# Patient Record
Sex: FEMALE | Race: WHITE | NOT HISPANIC OR LATINO
[De-identification: names, ages, dates, MRNs, and addresses within clinical notes are randomized per-mention and may not be internally consistent; named-entity substitution may affect disease eponyms.]

---

## 2016-09-10 RX ORDER — METOPROLOL TARTRATE 50 MG
1 TABLET ORAL
Qty: 0 | Refills: 0 | COMMUNITY
Start: 2016-09-10

## 2016-09-10 RX ORDER — AMIODARONE HYDROCHLORIDE 400 MG/1
1 TABLET ORAL
Qty: 0 | Refills: 0 | COMMUNITY
Start: 2016-09-10

## 2016-09-10 RX ORDER — DOCUSATE SODIUM 100 MG
1 CAPSULE ORAL
Qty: 0 | Refills: 0 | COMMUNITY
Start: 2016-09-10

## 2017-01-06 ENCOUNTER — APPOINTMENT (OUTPATIENT)
Dept: RADIOLOGY | Facility: HOSPITAL | Age: 82
End: 2017-01-06

## 2017-01-06 ENCOUNTER — OUTPATIENT (OUTPATIENT)
Dept: OUTPATIENT SERVICES | Facility: HOSPITAL | Age: 82
LOS: 1 days | End: 2017-01-06
Payer: COMMERCIAL

## 2017-01-06 DIAGNOSIS — R10.2 PELVIC AND PERINEAL PAIN: ICD-10-CM

## 2017-01-06 PROCEDURE — 72170 X-RAY EXAM OF PELVIS: CPT | Mod: 26

## 2017-01-09 ENCOUNTER — RECORD ABSTRACTING (OUTPATIENT)
Age: 82
End: 2017-01-09

## 2017-01-09 DIAGNOSIS — N95.0 POSTMENOPAUSAL BLEEDING: ICD-10-CM

## 2017-01-10 ENCOUNTER — INPATIENT (INPATIENT)
Facility: HOSPITAL | Age: 82
LOS: 2 days | Discharge: NURSING, REHAB OR LONG-TERM CARE | End: 2017-01-13
Attending: INTERNAL MEDICINE | Admitting: INTERNAL MEDICINE
Payer: MEDICARE

## 2017-01-10 ENCOUNTER — APPOINTMENT (OUTPATIENT)
Dept: GYNECOLOGIC ONCOLOGY | Facility: CLINIC | Age: 82
End: 2017-01-10

## 2017-01-10 VITALS
SYSTOLIC BLOOD PRESSURE: 127 MMHG | RESPIRATION RATE: 18 BRPM | OXYGEN SATURATION: 99 % | DIASTOLIC BLOOD PRESSURE: 57 MMHG | HEART RATE: 60 BPM | TEMPERATURE: 98 F

## 2017-01-10 DIAGNOSIS — S32.10XA UNSPECIFIED FRACTURE OF SACRUM, INITIAL ENCOUNTER FOR CLOSED FRACTURE: ICD-10-CM

## 2017-01-10 DIAGNOSIS — Z95.828 PRESENCE OF OTHER VASCULAR IMPLANTS AND GRAFTS: Chronic | ICD-10-CM

## 2017-01-10 LAB
ALBUMIN SERPL ELPH-MCNC: 3.3 G/DL — SIGNIFICANT CHANGE UP (ref 3.3–5)
ALP SERPL-CCNC: 84 U/L — SIGNIFICANT CHANGE UP (ref 40–120)
ALT FLD-CCNC: 14 U/L — SIGNIFICANT CHANGE UP (ref 4–33)
ANISOCYTOSIS BLD QL: SLIGHT — SIGNIFICANT CHANGE UP
AST SERPL-CCNC: 18 U/L — SIGNIFICANT CHANGE UP (ref 4–32)
BASOPHILS # BLD AUTO: 0.02 K/UL — SIGNIFICANT CHANGE UP (ref 0–0.2)
BASOPHILS NFR BLD AUTO: 0.3 % — SIGNIFICANT CHANGE UP (ref 0–2)
BASOPHILS NFR SPEC: 0 % — SIGNIFICANT CHANGE UP (ref 0–2)
BILIRUB SERPL-MCNC: 0.4 MG/DL — SIGNIFICANT CHANGE UP (ref 0.2–1.2)
BUN SERPL-MCNC: 23 MG/DL — SIGNIFICANT CHANGE UP (ref 7–23)
CALCIUM SERPL-MCNC: 8.4 MG/DL — SIGNIFICANT CHANGE UP (ref 8.4–10.5)
CHLORIDE SERPL-SCNC: 104 MMOL/L — SIGNIFICANT CHANGE UP (ref 98–107)
CO2 SERPL-SCNC: 22 MMOL/L — SIGNIFICANT CHANGE UP (ref 22–31)
CREAT SERPL-MCNC: 0.75 MG/DL — SIGNIFICANT CHANGE UP (ref 0.5–1.3)
EOSINOPHIL # BLD AUTO: 0.12 K/UL — SIGNIFICANT CHANGE UP (ref 0–0.5)
EOSINOPHIL NFR BLD AUTO: 1.7 % — SIGNIFICANT CHANGE UP (ref 0–6)
EOSINOPHIL NFR FLD: 4 % — SIGNIFICANT CHANGE UP (ref 0–6)
GLUCOSE SERPL-MCNC: 80 MG/DL — SIGNIFICANT CHANGE UP (ref 70–99)
HCT VFR BLD CALC: 37.4 % — SIGNIFICANT CHANGE UP (ref 34.5–45)
HGB BLD-MCNC: 11.9 G/DL — SIGNIFICANT CHANGE UP (ref 11.5–15.5)
IMM GRANULOCYTES NFR BLD AUTO: 0.4 % — SIGNIFICANT CHANGE UP (ref 0–1.5)
LYMPHOCYTES # BLD AUTO: 1.82 K/UL — SIGNIFICANT CHANGE UP (ref 1–3.3)
LYMPHOCYTES # BLD AUTO: 26.5 % — SIGNIFICANT CHANGE UP (ref 13–44)
LYMPHOCYTES NFR SPEC AUTO: 24 % — SIGNIFICANT CHANGE UP (ref 13–44)
MACROCYTES BLD QL: SLIGHT — SIGNIFICANT CHANGE UP
MANUAL SMEAR VERIFICATION: YES — SIGNIFICANT CHANGE UP
MCHC RBC-ENTMCNC: 31.8 % — LOW (ref 32–36)
MCHC RBC-ENTMCNC: 34.5 PG — HIGH (ref 27–34)
MCV RBC AUTO: 108.4 FL — HIGH (ref 80–100)
MONOCYTES # BLD AUTO: 0.64 K/UL — SIGNIFICANT CHANGE UP (ref 0–0.9)
MONOCYTES NFR BLD AUTO: 9.3 % — SIGNIFICANT CHANGE UP (ref 2–14)
MONOCYTES NFR BLD: 2 % — SIGNIFICANT CHANGE UP (ref 2–9)
NEUTROPHIL AB SER-ACNC: 67 % — SIGNIFICANT CHANGE UP (ref 43–77)
NEUTROPHILS # BLD AUTO: 4.25 K/UL — SIGNIFICANT CHANGE UP (ref 1.8–7.4)
NEUTROPHILS NFR BLD AUTO: 61.8 % — SIGNIFICANT CHANGE UP (ref 43–77)
NEUTS BAND # BLD: 1 % — SIGNIFICANT CHANGE UP (ref 0–6)
OVALOCYTES BLD QL SMEAR: SLIGHT — SIGNIFICANT CHANGE UP
PLATELET # BLD AUTO: 238 K/UL — SIGNIFICANT CHANGE UP (ref 150–400)
PLATELET COUNT - ESTIMATE: NORMAL — SIGNIFICANT CHANGE UP
PMV BLD: 11.2 FL — SIGNIFICANT CHANGE UP (ref 7–13)
POTASSIUM SERPL-MCNC: 4.6 MMOL/L — SIGNIFICANT CHANGE UP (ref 3.5–5.3)
POTASSIUM SERPL-SCNC: 4.6 MMOL/L — SIGNIFICANT CHANGE UP (ref 3.5–5.3)
PROT SERPL-MCNC: 6.4 G/DL — SIGNIFICANT CHANGE UP (ref 6–8.3)
RBC # BLD: 3.45 M/UL — LOW (ref 3.8–5.2)
RBC # FLD: 14.3 % — SIGNIFICANT CHANGE UP (ref 10.3–14.5)
SODIUM SERPL-SCNC: 139 MMOL/L — SIGNIFICANT CHANGE UP (ref 135–145)
VARIANT LYMPHS # BLD: 2 % — SIGNIFICANT CHANGE UP
WBC # BLD: 6.88 K/UL — SIGNIFICANT CHANGE UP (ref 3.8–10.5)
WBC # FLD AUTO: 6.88 K/UL — SIGNIFICANT CHANGE UP (ref 3.8–10.5)

## 2017-01-10 PROCEDURE — 72192 CT PELVIS W/O DYE: CPT | Mod: 26

## 2017-01-10 PROCEDURE — 99223 1ST HOSP IP/OBS HIGH 75: CPT

## 2017-01-10 PROCEDURE — 76376 3D RENDER W/INTRP POSTPROCES: CPT | Mod: 26

## 2017-01-10 RX ORDER — ENOXAPARIN SODIUM 100 MG/ML
40 INJECTION SUBCUTANEOUS EVERY 24 HOURS
Qty: 0 | Refills: 0 | Status: DISCONTINUED | OUTPATIENT
Start: 2017-01-10 | End: 2017-01-13

## 2017-01-10 RX ORDER — TRAMADOL HYDROCHLORIDE 50 MG/1
50 TABLET ORAL ONCE
Qty: 0 | Refills: 0 | Status: DISCONTINUED | OUTPATIENT
Start: 2017-01-10 | End: 2017-01-10

## 2017-01-10 RX ORDER — LIDOCAINE 4 G/100G
1 CREAM TOPICAL ONCE
Qty: 0 | Refills: 0 | Status: COMPLETED | OUTPATIENT
Start: 2017-01-10 | End: 2017-01-10

## 2017-01-10 RX ADMIN — LIDOCAINE 1 PATCH: 4 CREAM TOPICAL at 20:28

## 2017-01-10 RX ADMIN — TRAMADOL HYDROCHLORIDE 50 MILLIGRAM(S): 50 TABLET ORAL at 21:28

## 2017-01-10 RX ADMIN — TRAMADOL HYDROCHLORIDE 50 MILLIGRAM(S): 50 TABLET ORAL at 20:28

## 2017-01-10 NOTE — ED PROVIDER NOTE - OBJECTIVE STATEMENT
89F h/o CHF, PPM for sick sinus, DVTs with IVC filter, off AC due to hemorrhagic stroke, UTIs, macular degeneration, [place], hypothyroidism, post menopausal vag bleed not yet worked up, p/w buttock pain. She had fall on buttocks 10 days ago. had neg Xray pelvis and has been treated with tylenol initially but pain has worsened especially lower sacral area. sent in by PCP for CT pelvis. she only has pain when moving or being touched. No other injuries reported. No other acute symptoms.   Meds: levothyroxin, amiodorone, spironolactone, furosamide, stool softeners.

## 2017-01-10 NOTE — H&P ADULT. - PROBLEM SELECTOR PLAN 2
- noted on TTE of 05/2016; no acute issues presently  - Aide reports significant b/l leg edema at times; only mild when seen  - on lasix at home (20 mg TIW) - consider changing to daily  - continues on cardioprotective medications - noted on TTE of 05/2016; no acute issues presently  - Aide reports significant b/l leg edema at times; only mild when seen  - on lasix at home (20 mg TIW) - consider changing to daily  - continues on cardioprotective medications  - f/u TSH level in the AM  - please follow-up with pharmacy for clarification of medications (e-mailed)

## 2017-01-10 NOTE — ED PROVIDER NOTE - CARE PLAN
Principal Discharge DX:	Sacral fracture, closed Principal Discharge DX:	Sacral fracture, closed  Instructions for follow-up, activity and diet:	admit for inability to ambulate - pain control, PT eval - possible rehab placement

## 2017-01-10 NOTE — H&P ADULT. - PMH
Cerebrovascular accident (CVA) due to other mechanism  ~ hemorrhagic  CHF (Congestive Heart Failure)  ~ diastolic, Stage I (05/20/2016)  Dementia    DVT (deep venous thrombosis)    Hard of hearing, bilateral    Hypothyroidism, unspecified type    Kidney tumor    Macular degeneration    Post menopausal problems  ~ bleeding (no previous evaluation for same)

## 2017-01-10 NOTE — ED ADULT TRIAGE NOTE - CHIEF COMPLAINT QUOTE
pt has increase of pain for several days. Pt states she is s/p fall causing injury to her buttock area pt states pain has increased to her legs making walking difficult

## 2017-01-10 NOTE — ED ADULT NURSE NOTE - PMH
CHF (Congestive Heart Failure)    Dementia    Dementia    DVT (deep venous thrombosis)    Kidney tumor

## 2017-01-10 NOTE — ED ADULT NURSE NOTE - OBJECTIVE STATEMENT
Pt received to rm 26, a&ox3, c/o pain to sacrum s/p fall 10 days ago -- as per family member pt had XR 7 days after fall that was unremarkable. Pt referred to ED by GYN for vaginal bleeding with questionable tumor--pt requesting possible CT. MD Cardona by bedside. Labs drawn and sent, IVL placed. Report given to primary RN Diamond. Skin intact. (float RN)

## 2017-01-10 NOTE — H&P ADULT. - PROBLEM SELECTOR PLAN 6
- dysphagia 3, soft thin liquids, ensure plus 1 can daily (prescribed on last admission; reinitiated)

## 2017-01-10 NOTE — ED PROVIDER NOTE - PROGRESS NOTE DETAILS
CT shows S3 fracture, spine recommends rest analgesia only, In spite of added analgesia- tramadol and lidocaine patch, patient unable to get up due to pain and will need admission

## 2017-01-10 NOTE — ED PROVIDER NOTE - PHYSICAL EXAMINATION
C spine non tender, no head injury, T/L spine non tender, + sacral tenderness, no deformity, moves legs normally, sensation intact.

## 2017-01-10 NOTE — H&P ADULT. - PROBLEM SELECTOR PLAN 4
- MCV persistently elevated  - last vitamin B12 level = 498 (05/2016)  - methymalonic acid level, TSH level in the AM  - ?appears to be on vitamin B-12 supplement orally, but if intrinsic factor deficient/not working, better to supplement sublingually chronically, or IM acutely (pending methylmalonic acid level result) - MCV persistently elevated  - last vitamin B12 level = 498 (05/2016)  - methymalonic acid level, TSH level in the AM  - ?appears to be on vitamin B-12 supplement orally, but if intrinsic factor deficient/not working, better to supplement IM or sublingually (pending methylmalonic acid level result)

## 2017-01-10 NOTE — H&P ADULT. - HISTORY OF PRESENT ILLNESS
89 year old female, with PH significant for CHF, DVT (no longer on AC), Hemorrhagic CVA, Macular degeneration, Dementia, Kidney tumor, Hard of hearing, Hypothyroidism, Post menopausal bleeding and IVC filter, presented to the ED secondary to       Vital signs upon ED presentation as follows: BP =127/57, HR = 60, RR = 18, T = 36.8 C (98.2 F), O2 Sat = 99 R on RA.  Diagnosed with Sacral Fracture, Closed.  Prescribed lidocaine patch, and tramadol 50 mg x 1. 89 year old female, with PH significant for CHF, DVT (no longer on AC), Hemorrhagic CVA, Macular degeneration, Dementia, Kidney tumor, Hard of hearing, Hypothyroidism, Post menopausal bleeding (not evaluated) and IVC filter, presented to the ED, after being sent by her PMD, secondary to worsening buttock pain.  Seen and evaluated at bedside with HHA at bedside; NAD.  Patient reports that she has been having right groin pain for the past few days.  Complains of pain with movement of the joint.  States no prior or current pain of the buttock - despite repeated questioning (but Aide confirms presence of pain prior to ED).  Patient reportedly fell in the bathroom ~ 10 days ago; presumed that she was using the toilet and tried to get up when she fell.  Ambulates with a walker at baseline, and still does so, despite the pain.  After the fall, patient was noted to have undergone an X-ray, with negative findings, then treated with tylenol PRN pain.  However, pain worsened and PMD decided to sent patient to the Hospital for further evaluation - including CT scan of the area.    Vital signs upon ED presentation as follows: BP =127/57, HR = 60, RR = 18, T = 36.8 C (98.2 F), O2 Sat = 99 R on RA.  Diagnosed with Sacral Fracture, Closed.  Prescribed lidocaine patch, and tramadol 50 mg x 1.    Ambulates with a walker at baseline.

## 2017-01-10 NOTE — H&P ADULT. - PROBLEM SELECTOR PLAN 1
- most likely sustained during fall ~ 10 days ago  - also complains of right groin pain  - [id number] year old female, with PH significant for CHF, DVT (no longer on AC), Hemorrhagic CVA, Macular degeneration, Dementia, Kidney tumor, Hard of hearing, Hypothyroidism, Post menopausal bleeding (not evaluated) and IVC filter, presented to the ED, after being sent by her PMD, secondary to worsening buttock pain.  Seen and evaluated at bedside with HHA at bedside; NAD.  Patient reports that she has been having right groin pain for the past few days.  Complains of pain with movement of the joint.  States no prior or current pain of the buttock - despite repeated questioning (but Aide confirms presence of pain prior to ED).  Patient reportedly fell in the bathroom ~ 10 days ago; presumed that she was using the toilet and tried to get up when she fell.  Ambulates with a walker at baseline, and still does so, despite the pain.  After the fall, patient was noted to have undergone an X-ray, with negative findings, then treated with tylenol PRN pain.  However, pain worsened and PMD decided to sent patient to the Hospital for further evaluation - including CT scan of the area.  - already prescribed lidoderm patch and tramadol in the ED; will continue, along with tylenol  - fall precautions  - physical therapy (order placed)  - social work evaluation (order placed) - presents with worsening sacral pain, and now complaining of right groin pain  - most likely sustained during fall ~ 10 days ago  - already prescribed lidoderm patch and tramadol in the ED; will continue, along with tylenol  - vitamin d3, vitamin B12, TSH levels in the AM  - fall precautions  - physical therapy (order placed)  - social work evaluation (order placed)

## 2017-01-10 NOTE — H&P ADULT. - PROBLEM SELECTOR PLAN 3
- no acute issues presently  - on levothyroxine 50 mcg per handwritten paper in chart  - follow-up TSH level in the AM - especially in the setting of CHF - no acute issues presently  - on levothyroxine 50 mcg per handwritten paper in chart  - follow-up TSH level in the AM - especially in the setting of CHF  - please follow-up with pharmacy for clarification of medications (e-mailed)

## 2017-01-10 NOTE — H&P ADULT. - ASSESSMENT
89 year old female, with PH significant for CHF, DVT (no longer on AC), Hemorrhagic CVA, Macular degeneration, Dementia, Kidney tumor, Hard of hearing, Hypothyroidism, Post menopausal bleeding (not evaluated) and IVC filter, presented to the ED, after being sent by her PMD, secondary to worsening buttock pain.  Diagnosed with Sacral Fracture, Closed.  Admitted for further management of:

## 2017-01-11 DIAGNOSIS — R63.8 OTHER SYMPTOMS AND SIGNS CONCERNING FOOD AND FLUID INTAKE: ICD-10-CM

## 2017-01-11 DIAGNOSIS — I50.30 UNSPECIFIED DIASTOLIC (CONGESTIVE) HEART FAILURE: ICD-10-CM

## 2017-01-11 DIAGNOSIS — Z41.8 ENCOUNTER FOR OTHER PROCEDURES FOR PURPOSES OTHER THAN REMEDYING HEALTH STATE: ICD-10-CM

## 2017-01-11 DIAGNOSIS — E03.9 HYPOTHYROIDISM, UNSPECIFIED: ICD-10-CM

## 2017-01-11 DIAGNOSIS — S32.139A: ICD-10-CM

## 2017-01-11 DIAGNOSIS — D75.89 OTHER SPECIFIED DISEASES OF BLOOD AND BLOOD-FORMING ORGANS: ICD-10-CM

## 2017-01-11 PROBLEM — N95.9 UNSPECIFIED MENOPAUSAL AND PERIMENOPAUSAL DISORDER: Chronic | Status: ACTIVE | Noted: 2017-01-10

## 2017-01-11 LAB
24R-OH-CALCIDIOL SERPL-MCNC: 22.4 NG/ML — LOW (ref 30–100)
TSH SERPL-MCNC: 30.69 UIU/ML — HIGH (ref 0.27–4.2)

## 2017-01-11 RX ORDER — DOCUSATE SODIUM 100 MG
100 CAPSULE ORAL THREE TIMES A DAY
Qty: 0 | Refills: 0 | Status: DISCONTINUED | OUTPATIENT
Start: 2017-01-11 | End: 2017-01-13

## 2017-01-11 RX ORDER — ASPIRIN/CALCIUM CARB/MAGNESIUM 324 MG
81 TABLET ORAL DAILY
Qty: 0 | Refills: 0 | Status: DISCONTINUED | OUTPATIENT
Start: 2017-01-11 | End: 2017-01-13

## 2017-01-11 RX ORDER — RACEMETHIONINE 15 MG/ML
500 LIQUID (ML) ORAL
Qty: 0 | Refills: 0 | COMMUNITY

## 2017-01-11 RX ORDER — SENNA PLUS 8.6 MG/1
3 TABLET ORAL
Qty: 0 | Refills: 0 | COMMUNITY

## 2017-01-11 RX ORDER — POLYETHYLENE GLYCOL 3350 17 G/17G
17 POWDER, FOR SOLUTION ORAL
Qty: 0 | Refills: 0 | Status: DISCONTINUED | OUTPATIENT
Start: 2017-01-11 | End: 2017-01-13

## 2017-01-11 RX ORDER — LEVOTHYROXINE SODIUM 125 MCG
50 TABLET ORAL DAILY
Qty: 0 | Refills: 0 | Status: DISCONTINUED | OUTPATIENT
Start: 2017-01-11 | End: 2017-01-12

## 2017-01-11 RX ORDER — AMIODARONE HYDROCHLORIDE 400 MG/1
200 TABLET ORAL DAILY
Qty: 0 | Refills: 0 | Status: DISCONTINUED | OUTPATIENT
Start: 2017-01-11 | End: 2017-01-13

## 2017-01-11 RX ORDER — SODIUM CHLORIDE 9 MG/ML
1000 INJECTION INTRAMUSCULAR; INTRAVENOUS; SUBCUTANEOUS
Qty: 0 | Refills: 0 | Status: DISCONTINUED | OUTPATIENT
Start: 2017-01-11 | End: 2017-01-13

## 2017-01-11 RX ORDER — LIDOCAINE 4 G/100G
1 CREAM TOPICAL DAILY
Qty: 0 | Refills: 0 | Status: DISCONTINUED | OUTPATIENT
Start: 2017-01-11 | End: 2017-01-13

## 2017-01-11 RX ORDER — FUROSEMIDE 40 MG
20 TABLET ORAL DAILY
Qty: 0 | Refills: 0 | Status: DISCONTINUED | OUTPATIENT
Start: 2017-01-11 | End: 2017-01-13

## 2017-01-11 RX ORDER — TRAMADOL HYDROCHLORIDE 50 MG/1
50 TABLET ORAL EVERY 8 HOURS
Qty: 0 | Refills: 0 | Status: DISCONTINUED | OUTPATIENT
Start: 2017-01-11 | End: 2017-01-12

## 2017-01-11 RX ORDER — ATORVASTATIN CALCIUM 80 MG/1
20 TABLET, FILM COATED ORAL AT BEDTIME
Qty: 0 | Refills: 0 | Status: DISCONTINUED | OUTPATIENT
Start: 2017-01-11 | End: 2017-01-13

## 2017-01-11 RX ORDER — POLYETHYLENE GLYCOL 3350 17 G/17G
0 POWDER, FOR SOLUTION ORAL
Qty: 0 | Refills: 0 | COMMUNITY

## 2017-01-11 RX ORDER — SENNA PLUS 8.6 MG/1
2 TABLET ORAL AT BEDTIME
Qty: 0 | Refills: 0 | Status: DISCONTINUED | OUTPATIENT
Start: 2017-01-11 | End: 2017-01-13

## 2017-01-11 RX ORDER — SPIRONOLACTONE 25 MG/1
1 TABLET, FILM COATED ORAL
Qty: 0 | Refills: 0 | COMMUNITY

## 2017-01-11 RX ORDER — ACETAMINOPHEN 500 MG
650 TABLET ORAL EVERY 6 HOURS
Qty: 0 | Refills: 0 | Status: DISCONTINUED | OUTPATIENT
Start: 2017-01-11 | End: 2017-01-13

## 2017-01-11 RX ADMIN — LIDOCAINE 1 PATCH: 4 CREAM TOPICAL at 11:03

## 2017-01-11 RX ADMIN — Medication 50 MICROGRAM(S): at 06:16

## 2017-01-11 RX ADMIN — ATORVASTATIN CALCIUM 20 MILLIGRAM(S): 80 TABLET, FILM COATED ORAL at 21:08

## 2017-01-11 RX ADMIN — LIDOCAINE 1 PATCH: 4 CREAM TOPICAL at 20:27

## 2017-01-11 RX ADMIN — Medication 100 MILLIGRAM(S): at 06:16

## 2017-01-11 RX ADMIN — ENOXAPARIN SODIUM 40 MILLIGRAM(S): 100 INJECTION SUBCUTANEOUS at 23:09

## 2017-01-11 RX ADMIN — AMIODARONE HYDROCHLORIDE 200 MILLIGRAM(S): 400 TABLET ORAL at 06:16

## 2017-01-11 RX ADMIN — Medication 100 MILLIGRAM(S): at 11:37

## 2017-01-11 RX ADMIN — Medication 1 TABLET(S): at 11:03

## 2017-01-11 RX ADMIN — SENNA PLUS 2 TABLET(S): 8.6 TABLET ORAL at 21:08

## 2017-01-11 RX ADMIN — Medication 0.5 MILLIGRAM(S): at 23:08

## 2017-01-11 RX ADMIN — SODIUM CHLORIDE 75 MILLILITER(S): 9 INJECTION INTRAMUSCULAR; INTRAVENOUS; SUBCUTANEOUS at 07:53

## 2017-01-11 RX ADMIN — TRAMADOL HYDROCHLORIDE 50 MILLIGRAM(S): 50 TABLET ORAL at 08:36

## 2017-01-11 RX ADMIN — TRAMADOL HYDROCHLORIDE 50 MILLIGRAM(S): 50 TABLET ORAL at 08:00

## 2017-01-11 RX ADMIN — Medication 100 MILLIGRAM(S): at 21:08

## 2017-01-11 RX ADMIN — ENOXAPARIN SODIUM 40 MILLIGRAM(S): 100 INJECTION SUBCUTANEOUS at 00:57

## 2017-01-11 RX ADMIN — Medication 81 MILLIGRAM(S): at 11:03

## 2017-01-11 NOTE — PHYSICAL THERAPY INITIAL EVALUATION ADULT - PERTINENT HX OF CURRENT PROBLEM, REHAB EVAL
89 year old female, with PH significant for CHF, DVT (no longer on AC), Hemorrhagic CVA, Macular degeneration, Dementia, Kidney tumor, Hard of hearing, Hypothyroidism, Post menopausal bleeding (not evaluated) and IVC filter, presented to the ED, after being sent by her PMD, secondary to worsening buttock pain.

## 2017-01-11 NOTE — PROVIDER CONTACT NOTE (MEDICATION) - RECOMMENDATIONS
Consider increase in dose of levothyroxine if pt is compliant; consider d/c methenamine upon discharge; consider possible pt non-compliance with aldactone and furosemide.

## 2017-01-11 NOTE — PROVIDER CONTACT NOTE (MEDICATION) - ASSESSMENT
EP is a 89yo F, with PMH: CHF, DVT (no longer on AC), hemorrhagic CVA, macular degeneration, dementia, kidney tumor, hypothyroidism, post menopausal bleeding (not evaluated) and IVC filter. She presented to ED (sent by PMD), secondary to worsening buttock pain. TSH level high, indicating pt is either non-compliant with taking levothyroxine 50mcg tablet once daily or an increase in dose is necessary. Methenamine dosing for UTI prophylaxis is 1g twice daily and is contraindicated in any renal impairment (pt currently taking 500mg once daily). Upon discharge, consider discontinuing this medication if any renal impairment present or if not necessary (no evidence of dosage reduction benefit). Interview with caretaker reveals that pt is taking aldactone TIW rather than QD for unknown reasons. Pt has not filled furosemide 20mg since 10/21/16 (one month supply), indicting possible non-compliance.

## 2017-01-11 NOTE — PATIENT PROFILE ADULT. - NS PRO AD PATIENT TYPE
Do Not Resuscitate (DNR)/Medical Orders for Life-Sustaining Treatment (MOLST)/Health Care Proxy (HCP)

## 2017-01-12 ENCOUNTER — TRANSCRIPTION ENCOUNTER (OUTPATIENT)
Age: 82
End: 2017-01-12

## 2017-01-12 LAB
ALBUMIN SERPL ELPH-MCNC: 3.6 G/DL — SIGNIFICANT CHANGE UP (ref 3.3–5)
ALP SERPL-CCNC: 99 U/L — SIGNIFICANT CHANGE UP (ref 40–120)
ALT FLD-CCNC: 13 U/L — SIGNIFICANT CHANGE UP (ref 4–33)
AST SERPL-CCNC: 19 U/L — SIGNIFICANT CHANGE UP (ref 4–32)
BASOPHILS # BLD AUTO: 0.02 K/UL — SIGNIFICANT CHANGE UP (ref 0–0.2)
BASOPHILS NFR BLD AUTO: 0.3 % — SIGNIFICANT CHANGE UP (ref 0–2)
BILIRUB SERPL-MCNC: 0.6 MG/DL — SIGNIFICANT CHANGE UP (ref 0.2–1.2)
BUN SERPL-MCNC: 16 MG/DL — SIGNIFICANT CHANGE UP (ref 7–23)
CALCIUM SERPL-MCNC: 9.2 MG/DL — SIGNIFICANT CHANGE UP (ref 8.4–10.5)
CHLORIDE SERPL-SCNC: 104 MMOL/L — SIGNIFICANT CHANGE UP (ref 98–107)
CO2 SERPL-SCNC: 23 MMOL/L — SIGNIFICANT CHANGE UP (ref 22–31)
CREAT SERPL-MCNC: 0.79 MG/DL — SIGNIFICANT CHANGE UP (ref 0.5–1.3)
EOSINOPHIL # BLD AUTO: 0.14 K/UL — SIGNIFICANT CHANGE UP (ref 0–0.5)
EOSINOPHIL NFR BLD AUTO: 2 % — SIGNIFICANT CHANGE UP (ref 0–6)
GLUCOSE SERPL-MCNC: 98 MG/DL — SIGNIFICANT CHANGE UP (ref 70–99)
HCT VFR BLD CALC: 38.6 % — SIGNIFICANT CHANGE UP (ref 34.5–45)
HGB BLD-MCNC: 12.2 G/DL — SIGNIFICANT CHANGE UP (ref 11.5–15.5)
IMM GRANULOCYTES NFR BLD AUTO: 0.1 % — SIGNIFICANT CHANGE UP (ref 0–1.5)
LYMPHOCYTES # BLD AUTO: 1.62 K/UL — SIGNIFICANT CHANGE UP (ref 1–3.3)
LYMPHOCYTES # BLD AUTO: 23.4 % — SIGNIFICANT CHANGE UP (ref 13–44)
MANUAL SMEAR VERIFICATION: SIGNIFICANT CHANGE UP
MCHC RBC-ENTMCNC: 31.6 % — LOW (ref 32–36)
MCHC RBC-ENTMCNC: 34 PG — SIGNIFICANT CHANGE UP (ref 27–34)
MCV RBC AUTO: 107.5 FL — HIGH (ref 80–100)
MONOCYTES # BLD AUTO: 0.63 K/UL — SIGNIFICANT CHANGE UP (ref 0–0.9)
MONOCYTES NFR BLD AUTO: 9.1 % — SIGNIFICANT CHANGE UP (ref 2–14)
NEUTROPHILS # BLD AUTO: 4.49 K/UL — SIGNIFICANT CHANGE UP (ref 1.8–7.4)
NEUTROPHILS NFR BLD AUTO: 65.1 % — SIGNIFICANT CHANGE UP (ref 43–77)
PLATELET # BLD AUTO: 278 K/UL — SIGNIFICANT CHANGE UP (ref 150–400)
PMV BLD: 11 FL — SIGNIFICANT CHANGE UP (ref 7–13)
POTASSIUM SERPL-MCNC: 4.1 MMOL/L — SIGNIFICANT CHANGE UP (ref 3.5–5.3)
POTASSIUM SERPL-SCNC: 4.1 MMOL/L — SIGNIFICANT CHANGE UP (ref 3.5–5.3)
PROT SERPL-MCNC: 6.9 G/DL — SIGNIFICANT CHANGE UP (ref 6–8.3)
RBC # BLD: 3.59 M/UL — LOW (ref 3.8–5.2)
RBC # FLD: 14.2 % — SIGNIFICANT CHANGE UP (ref 10.3–14.5)
SODIUM SERPL-SCNC: 142 MMOL/L — SIGNIFICANT CHANGE UP (ref 135–145)
T3 SERPL-MCNC: 80.8 NG/DL — SIGNIFICANT CHANGE UP (ref 80–200)
T4 AB SER-ACNC: 5.06 UG/DL — LOW (ref 5.1–13)
TSH SERPL-MCNC: 43.73 UIU/ML — HIGH (ref 0.27–4.2)
WBC # BLD: 6.91 K/UL — SIGNIFICANT CHANGE UP (ref 3.8–10.5)
WBC # FLD AUTO: 6.91 K/UL — SIGNIFICANT CHANGE UP (ref 3.8–10.5)

## 2017-01-12 RX ORDER — LEVOTHYROXINE SODIUM 125 MCG
100 TABLET ORAL DAILY
Qty: 0 | Refills: 0 | Status: DISCONTINUED | OUTPATIENT
Start: 2017-01-12 | End: 2017-01-13

## 2017-01-12 RX ORDER — TRAMADOL HYDROCHLORIDE 50 MG/1
50 TABLET ORAL EVERY 8 HOURS
Qty: 0 | Refills: 0 | Status: DISCONTINUED | OUTPATIENT
Start: 2017-01-12 | End: 2017-01-13

## 2017-01-12 RX ORDER — CHOLECALCIFEROL (VITAMIN D3) 125 MCG
1000 CAPSULE ORAL DAILY
Qty: 0 | Refills: 0 | Status: DISCONTINUED | OUTPATIENT
Start: 2017-01-12 | End: 2017-01-13

## 2017-01-12 RX ADMIN — Medication 100 MILLIGRAM(S): at 22:15

## 2017-01-12 RX ADMIN — Medication 100 MILLIGRAM(S): at 06:02

## 2017-01-12 RX ADMIN — SENNA PLUS 2 TABLET(S): 8.6 TABLET ORAL at 22:15

## 2017-01-12 RX ADMIN — LIDOCAINE 1 PATCH: 4 CREAM TOPICAL at 22:15

## 2017-01-12 RX ADMIN — Medication 81 MILLIGRAM(S): at 11:06

## 2017-01-12 RX ADMIN — Medication 20 MILLIGRAM(S): at 06:01

## 2017-01-12 RX ADMIN — TRAMADOL HYDROCHLORIDE 50 MILLIGRAM(S): 50 TABLET ORAL at 17:56

## 2017-01-12 RX ADMIN — Medication 50 MICROGRAM(S): at 06:01

## 2017-01-12 RX ADMIN — Medication 1 TABLET(S): at 11:06

## 2017-01-12 RX ADMIN — AMIODARONE HYDROCHLORIDE 200 MILLIGRAM(S): 400 TABLET ORAL at 06:02

## 2017-01-12 RX ADMIN — Medication 100 MILLIGRAM(S): at 15:59

## 2017-01-12 RX ADMIN — TRAMADOL HYDROCHLORIDE 50 MILLIGRAM(S): 50 TABLET ORAL at 18:20

## 2017-01-12 RX ADMIN — ATORVASTATIN CALCIUM 20 MILLIGRAM(S): 80 TABLET, FILM COATED ORAL at 22:14

## 2017-01-12 RX ADMIN — Medication 0.5 MILLIGRAM(S): at 11:06

## 2017-01-12 RX ADMIN — Medication 1000 UNIT(S): at 15:59

## 2017-01-12 NOTE — DISCHARGE NOTE ADULT - CARE PLAN
Principal Discharge DX:	Sacral fracture, closed  Goal:	improved  Instructions for follow-up, activity and diet:	after a fall previous to admission Principal Discharge DX:	Sacral fracture, closed  Goal:	improved  Instructions for follow-up, activity and diet:	after a fall previous to admission  Follow up with physical therapy in rehab for increased ambulation ability and safe ambulation  Follow up with your primary care doctor  Secondary Diagnosis:	Hypothyroidism, unspecified type  Instructions for follow-up, activity and diet:	During your stay in the hospital we increased your synthroid dose to 100mcg because of your thyroid levels.  Please follow up with your primary care doctor or rehab physician to have your thyroid levels re-checked in order to determine appropriate dose of synthroid.

## 2017-01-12 NOTE — DISCHARGE NOTE ADULT - HOSPITAL COURSE
89 year old female, with PH significant for CHF, DVT (no longer on AC), Hemorrhagic CVA, Macular degeneration, Dementia, Kidney tumor, Hard of hearing, Hypothyroidism, Post menopausal bleeding (not evaluated) and IVC filter, presented to the ED, after being sent by her PMD, secondary to worsening buttock pain.  Diagnosed with Sacral Fracture, Closed.    Hospital Course:     Closed zone III fracture of sacrum  - presents with worsening sacral pain, and now complaining of right groin pain  - most likely sustained during fall ~ 10 days ago  - already prescribed lidoderm patch and tramadol in the ED; will continue, along with tylenol  - PT eval  - sw eval    CHF   - noted on TTE of 05/2016; no acute issues presently  - please follow-up with pharmacy for clarification of medications (e-mailed)- noted on TTE of 05/2016; no acute issues presently  - Aide reports significant b/l leg edema at times; only mild when seen    Hypothyroidism  - no acute issues presently  - on levothyroxine 50 mcg per handwritten paper in chart  - follow-up TSH level in the AM - especially in the setting of CHF    Macrocytosis  - MCV persistently elevated  - last vitamin B12 level = 498 (05/2016)  - methymalonic acid level, TSH level in the AM  - ?appears to be on vitamin B-12 supplement orally, but if intrinsic factor deficient/not working, better to supplement IM or sublingually (pending methylmalonic acid level result)    Need for prophylactic measure  - lovenox  - physical therapy  - ambulate with walker.     Nutrition  - dysphagia 3, soft thin liquids, ensure plus 1 can daily (prescribed on last admission; reinitiated). 89 year old female, with PH significant for CHF, DVT (no longer on AC), Hemorrhagic CVA, Macular degeneration, Dementia, Kidney tumor, Hard of hearing, Hypothyroidism, Post menopausal bleeding (not evaluated) and IVC filter, presented to the ED, after being sent by her PMD, secondary to worsening buttock pain.  Diagnosed with Sacral Fracture, Closed.    Hospital Course:     Closed zone III fracture of sacrum  - presents with worsening sacral pain, and now complaining of right groin pain  - most likely sustained during fall ~ 10 days ago  - already prescribed lidoderm patch and tramadol in the ED; will continue, along with tylenol  - PT eval - rec rehab  - sw eval - found placement in [place]    CHF   - noted on TTE of 05/2016; no acute issues presently  - Aide reports significant b/l leg edema at times; only mild when seen    Hypothyroidism  - no acute issues presently  - on levothyroxine 50 mcg per handwritten paper in chart  - follow-up TSH level in the AM - especially in the setting of CHF  - TSH elevated, Dr. Mcdonnell aware, synthroid dose increased to 100mcg daily, TSH to be followed up by PCP and/or rehab physician after discharge.    Macrocytosis  - MCV persistently elevated  - last vitamin B12 level = 498 (05/2016)  - methymalonic acid level, TSH level   - ?appears to be on vitamin B-12 supplement orally, but if intrinsic factor deficient/not working, better to supplement IM or sublingually (pending methylmalonic acid level result)    Need for prophylactic measure  - lovenox during admission  - physical therapy  - ambulate with walker.     Nutrition  - dysphagia 3, soft thin liquids, ensure plus 1 can daily (prescribed on last admission; reinitiated).    Dispo: rehab

## 2017-01-12 NOTE — DISCHARGE NOTE ADULT - ADDITIONAL INSTRUCTIONS
Please follow up with your primary care doctor or rehab physician to have your thyroid levels re-checked in order to determine appropriate dose of synthroid.    Follow physical therapy regimen in rehab in order to increase ambulation ability and safety while ambulating.

## 2017-01-12 NOTE — DISCHARGE NOTE ADULT - MEDICATION SUMMARY - MEDICATIONS TO STOP TAKING
I will STOP taking the medications listed below when I get home from the hospital:    atorvastatin 20 mg oral tablet  -- 1 tab(s) by mouth once a day (at bedtime)    metoprolol tartrate 25 mg oral tablet  -- 1 tab(s) by mouth 2 times a day    silver sulfADIAZINE 1% topical cream  -- 1 application on skin 2 times a day    Metoprolol Succinate ER 25 mg oral tablet, extended release  -- 1 tab(s) by mouth once a day    spironolactone 25 mg oral tablet  -- 1 tab(s) by mouth once a day

## 2017-01-12 NOTE — DISCHARGE NOTE ADULT - PATIENT PORTAL LINK FT
“You can access the [facility] Patient Portal, offered by [facility], by registering with the following website: [email or web address]”

## 2017-01-12 NOTE — DISCHARGE NOTE ADULT - PLAN OF CARE
improved after a fall previous to admission During your stay in the hospital we increased your synthroid dose to 100mcg because of your thyroid levels.  Please follow up with your primary care doctor or rehab physician to have your thyroid levels re-checked in order to determine appropriate dose of synthroid. after a fall previous to admission  Follow up with physical therapy in rehab for increased ambulation ability and safe ambulation  Follow up with your primary care doctor

## 2017-01-12 NOTE — DISCHARGE NOTE ADULT - MEDICATION SUMMARY - MEDICATIONS TO TAKE
I will START or STAY ON the medications listed below when I get home from the hospital:    spironolactone 25 mg oral tablet  -- 1 tab(s) by mouth three times a week monday, wednesday and friday  -- Indication: For CHF    acetaminophen 325 mg oral tablet  -- 2 tab(s) by mouth every 6 hours, As needed, Mild Pain (1 - 3)  -- Indication: For Mild pain    traMADol 50 mg oral tablet  -- 1 tab(s) by mouth every 8 hours, As needed, Moderate Pain (4 - 6)  -- Indication: For Moderate to severe pain    aspirin 81 mg oral tablet, chewable  -- 1 tab(s) by mouth once a day  -- Indication: For Cerebrovascular accident (CVA) due to other mechanism    amiodarone 200 mg oral tablet  -- 1 tab(s) by mouth once a day  -- Indication: For Diastolic congestive heart failure, unspecified congestive heart failure chronicity    LORazepam 0.5 mg oral tablet  -- 1 tab(s) by mouth once a day (at bedtime), As Needed  -- Indication: For anxiety    lidocaine 5% topical film  --  on skin   -- Indication: For localized pain    Proctosol-HC 2.5% rectal cream with applicator  -- 1 application rectally once a week, As Needed  -- Indication: For Hemmorhoids    Nystop 100,000 units/g topical powder  -- Apply on skin to affected area , As Needed  -- Indication: For rash, skin protectant     furosemide 20 mg oral tablet  -- 1 tab(s) by mouth 3 times a week on Monday, Wednesday, and Friday  -- Indication: For CHF    docusate sodium 100 mg oral capsule  -- 1 cap(s) by mouth 3 times a day  -- Indication: For Consipation    senna oral tablet  -- 2 tab(s) by mouth once a day (at bedtime)  -- Indication: For Constipation    polyethylene glycol 3350 oral powder for reconstitution  -- 17 gram(s) by mouth every 48 hours, As needed, Constipation  -- Indication: For Constipation    levothyroxine 100 mcg (0.1 mg) oral tablet  -- 1 tab(s) by mouth once a day  -- Indication: For Hypothyroidism, unspecified type    methenamine hippurate 1 g oral tablet  -- 0.5 tab(s) by mouth once a day  -- Indication: For Chronic UTI    PreserVision AREDS 2 oral capsule  -- 1 cap(s) by mouth 2 times a day  -- Indication: For Macular degeneration    Multiple Vitamins oral tablet  -- 1 tab(s) by mouth once a day  -- Indication: For Supplement    Vitamin B-100 oral tablet  --  by mouth   -- Indication: For Supplement    Vitamin D2 50,000 intl units (1.25 mg) oral capsule  -- 1 cap(s) by mouth once a month  -- Indication: For Supplement    cyanocobalamin 1000 mcg oral tablet  -- 1 tab(s) by mouth once a day  -- Indication: For Supplement

## 2017-01-13 VITALS — HEIGHT: 67 IN | WEIGHT: 173.5 LBS

## 2017-01-13 LAB
BUN SERPL-MCNC: 21 MG/DL — SIGNIFICANT CHANGE UP (ref 7–23)
CALCIUM SERPL-MCNC: 8.8 MG/DL — SIGNIFICANT CHANGE UP (ref 8.4–10.5)
CHLORIDE SERPL-SCNC: 104 MMOL/L — SIGNIFICANT CHANGE UP (ref 98–107)
CO2 SERPL-SCNC: 22 MMOL/L — SIGNIFICANT CHANGE UP (ref 22–31)
CREAT SERPL-MCNC: 0.84 MG/DL — SIGNIFICANT CHANGE UP (ref 0.5–1.3)
GLUCOSE SERPL-MCNC: 100 MG/DL — HIGH (ref 70–99)
HCT VFR BLD CALC: 38.8 % — SIGNIFICANT CHANGE UP (ref 34.5–45)
HGB BLD-MCNC: 12.3 G/DL — SIGNIFICANT CHANGE UP (ref 11.5–15.5)
MAGNESIUM SERPL-MCNC: 2.2 MG/DL — SIGNIFICANT CHANGE UP (ref 1.6–2.6)
MCHC RBC-ENTMCNC: 31.7 % — LOW (ref 32–36)
MCHC RBC-ENTMCNC: 34.1 PG — HIGH (ref 27–34)
MCV RBC AUTO: 107.5 FL — HIGH (ref 80–100)
PHOSPHATE SERPL-MCNC: 3.9 MG/DL — SIGNIFICANT CHANGE UP (ref 2.5–4.5)
PLATELET # BLD AUTO: 264 K/UL — SIGNIFICANT CHANGE UP (ref 150–400)
PMV BLD: 10.7 FL — SIGNIFICANT CHANGE UP (ref 7–13)
POTASSIUM SERPL-MCNC: 4 MMOL/L — SIGNIFICANT CHANGE UP (ref 3.5–5.3)
POTASSIUM SERPL-SCNC: 4 MMOL/L — SIGNIFICANT CHANGE UP (ref 3.5–5.3)
RBC # BLD: 3.61 M/UL — LOW (ref 3.8–5.2)
RBC # FLD: 14.2 % — SIGNIFICANT CHANGE UP (ref 10.3–14.5)
SODIUM SERPL-SCNC: 140 MMOL/L — SIGNIFICANT CHANGE UP (ref 135–145)
WBC # BLD: 6.6 K/UL — SIGNIFICANT CHANGE UP (ref 3.8–10.5)
WBC # FLD AUTO: 6.6 K/UL — SIGNIFICANT CHANGE UP (ref 3.8–10.5)

## 2017-01-13 RX ORDER — POLYETHYLENE GLYCOL 3350 17 G/17G
1 POWDER, FOR SOLUTION ORAL
Qty: 0 | Refills: 0 | COMMUNITY

## 2017-01-13 RX ORDER — LEVOTHYROXINE SODIUM 125 MCG
1 TABLET ORAL
Qty: 0 | Refills: 0 | COMMUNITY

## 2017-01-13 RX ORDER — LEVOTHYROXINE SODIUM 125 MCG
1 TABLET ORAL
Qty: 0 | Refills: 0 | COMMUNITY
Start: 2017-01-13

## 2017-01-13 RX ORDER — LIDOCAINE 4 G/100G
0 CREAM TOPICAL
Qty: 0 | Refills: 0 | COMMUNITY
Start: 2017-01-13

## 2017-01-13 RX ORDER — SPIRONOLACTONE 25 MG/1
1 TABLET, FILM COATED ORAL
Qty: 0 | Refills: 0 | COMMUNITY

## 2017-01-13 RX ORDER — SENNA PLUS 8.6 MG/1
2 TABLET ORAL
Qty: 0 | Refills: 0 | COMMUNITY
Start: 2017-01-13

## 2017-01-13 RX ORDER — TRAMADOL HYDROCHLORIDE 50 MG/1
1 TABLET ORAL
Qty: 0 | Refills: 0 | COMMUNITY
Start: 2017-01-13

## 2017-01-13 RX ORDER — POLYETHYLENE GLYCOL 3350 17 G/17G
17 POWDER, FOR SOLUTION ORAL
Qty: 0 | Refills: 0 | COMMUNITY
Start: 2017-01-13

## 2017-01-13 RX ORDER — ACETAMINOPHEN 500 MG
2 TABLET ORAL
Qty: 0 | Refills: 0 | COMMUNITY
Start: 2017-01-13

## 2017-01-13 RX ORDER — SENNA PLUS 8.6 MG/1
3 TABLET ORAL
Qty: 0 | Refills: 0 | COMMUNITY

## 2017-01-13 RX ADMIN — Medication 100 MILLIGRAM(S): at 06:21

## 2017-01-13 RX ADMIN — Medication 20 MILLIGRAM(S): at 06:21

## 2017-01-13 RX ADMIN — Medication 81 MILLIGRAM(S): at 12:28

## 2017-01-13 RX ADMIN — Medication 100 MILLIGRAM(S): at 12:29

## 2017-01-13 RX ADMIN — LIDOCAINE 1 PATCH: 4 CREAM TOPICAL at 10:27

## 2017-01-13 RX ADMIN — Medication 100 MICROGRAM(S): at 06:21

## 2017-01-13 RX ADMIN — Medication 1 TABLET(S): at 12:28

## 2017-01-13 RX ADMIN — Medication 1000 UNIT(S): at 12:28

## 2017-01-13 RX ADMIN — TRAMADOL HYDROCHLORIDE 50 MILLIGRAM(S): 50 TABLET ORAL at 16:40

## 2017-01-13 RX ADMIN — ENOXAPARIN SODIUM 40 MILLIGRAM(S): 100 INJECTION SUBCUTANEOUS at 06:17

## 2017-01-13 RX ADMIN — AMIODARONE HYDROCHLORIDE 200 MILLIGRAM(S): 400 TABLET ORAL at 06:21

## 2017-01-14 LAB — METHYLMALONATE SERPL-SCNC: 0.11 ZZ — SIGNIFICANT CHANGE UP

## 2017-02-20 PROBLEM — I63.8 OTHER CEREBRAL INFARCTION: Chronic | Status: ACTIVE | Noted: 2017-01-10

## 2017-02-20 PROBLEM — H35.30 UNSPECIFIED MACULAR DEGENERATION: Chronic | Status: ACTIVE | Noted: 2017-01-10

## 2017-02-20 PROBLEM — H91.93 UNSPECIFIED HEARING LOSS, BILATERAL: Chronic | Status: ACTIVE | Noted: 2017-01-10

## 2017-02-20 PROBLEM — E03.9 HYPOTHYROIDISM, UNSPECIFIED: Chronic | Status: ACTIVE | Noted: 2017-01-10

## 2017-04-12 ENCOUNTER — APPOINTMENT (OUTPATIENT)
Dept: CARDIOLOGY | Facility: CLINIC | Age: 82
End: 2017-04-12

## 2017-04-26 ENCOUNTER — APPOINTMENT (OUTPATIENT)
Dept: CARDIOLOGY | Facility: CLINIC | Age: 82
End: 2017-04-26

## 2017-05-03 ENCOUNTER — APPOINTMENT (OUTPATIENT)
Dept: CARDIOLOGY | Facility: CLINIC | Age: 82
End: 2017-05-03

## 2017-05-03 ENCOUNTER — NON-APPOINTMENT (OUTPATIENT)
Age: 82
End: 2017-05-03

## 2017-05-03 VITALS
RESPIRATION RATE: 12 BRPM | DIASTOLIC BLOOD PRESSURE: 79 MMHG | SYSTOLIC BLOOD PRESSURE: 143 MMHG | WEIGHT: 165 LBS | OXYGEN SATURATION: 96 % | HEIGHT: 65 IN | TEMPERATURE: 97.9 F | BODY MASS INDEX: 27.49 KG/M2 | HEART RATE: 60 BPM

## 2017-05-03 DIAGNOSIS — S32.10XA UNSPECIFIED FRACTURE OF SACRUM, INITIAL ENCOUNTER FOR CLOSED FRACTURE: ICD-10-CM

## 2017-05-03 RX ORDER — ERGOCALCIFEROL 1.25 MG/1
1.25 MG CAPSULE, LIQUID FILLED ORAL
Qty: 1 | Refills: 0 | Status: DISCONTINUED | COMMUNITY
Start: 2016-11-17

## 2017-05-03 RX ORDER — MULTIVITAMIN
TABLET ORAL
Qty: 100 | Refills: 0 | Status: DISCONTINUED | COMMUNITY
Start: 2016-10-06

## 2017-05-03 RX ORDER — TRAMADOL HYDROCHLORIDE 50 MG/1
50 TABLET, COATED ORAL
Qty: 90 | Refills: 0 | Status: DISCONTINUED | COMMUNITY
Start: 2017-02-08

## 2017-05-03 RX ORDER — METHENAMINE HIPPURATE 1 G/1
1 TABLET ORAL
Qty: 15 | Refills: 0 | Status: DISCONTINUED | COMMUNITY
Start: 2017-03-07

## 2017-05-03 RX ORDER — QUETIAPINE FUMARATE 25 MG/1
25 TABLET ORAL
Qty: 60 | Refills: 0 | Status: DISCONTINUED | COMMUNITY
Start: 2017-03-21

## 2017-05-03 RX ORDER — QUETIAPINE 25 MG/1
25 TABLET, FILM COATED ORAL
Qty: 30 | Refills: 5 | Status: ACTIVE | COMMUNITY
Start: 2017-05-03

## 2017-05-03 RX ORDER — LEVOTHYROXINE SODIUM 0.1 MG/1
100 TABLET ORAL
Qty: 30 | Refills: 0 | Status: DISCONTINUED | COMMUNITY
Start: 2017-02-08

## 2017-05-03 RX ORDER — ONDANSETRON 4 MG/1
4 TABLET ORAL
Qty: 90 | Refills: 0 | Status: DISCONTINUED | COMMUNITY
Start: 2017-02-03

## 2017-05-03 RX ORDER — VITAMIN B COMPLEX
CAPSULE ORAL
Qty: 30 | Refills: 0 | Status: DISCONTINUED | COMMUNITY
Start: 2016-09-14

## 2017-05-03 RX ORDER — AMIODARONE HYDROCHLORIDE 200 MG/1
200 TABLET ORAL DAILY
Qty: 45 | Refills: 1 | Status: ACTIVE | COMMUNITY
Start: 2017-05-03

## 2017-10-03 ENCOUNTER — APPOINTMENT (OUTPATIENT)
Dept: VASCULAR SURGERY | Facility: CLINIC | Age: 82
End: 2017-10-03
Payer: MEDICARE

## 2017-10-03 VITALS
HEART RATE: 60 BPM | WEIGHT: 165 LBS | DIASTOLIC BLOOD PRESSURE: 75 MMHG | HEIGHT: 65 IN | SYSTOLIC BLOOD PRESSURE: 125 MMHG | BODY MASS INDEX: 27.49 KG/M2

## 2017-10-03 PROCEDURE — 99214 OFFICE O/P EST MOD 30 MIN: CPT

## 2017-10-11 ENCOUNTER — NON-APPOINTMENT (OUTPATIENT)
Age: 82
End: 2017-10-11

## 2017-10-11 ENCOUNTER — APPOINTMENT (OUTPATIENT)
Dept: CARDIOLOGY | Facility: CLINIC | Age: 82
End: 2017-10-11
Payer: MEDICARE

## 2017-10-11 VITALS
HEIGHT: 65 IN | OXYGEN SATURATION: 96 % | HEART RATE: 61 BPM | SYSTOLIC BLOOD PRESSURE: 117 MMHG | DIASTOLIC BLOOD PRESSURE: 74 MMHG | BODY MASS INDEX: 27.49 KG/M2 | TEMPERATURE: 98 F | RESPIRATION RATE: 12 BRPM | WEIGHT: 165 LBS

## 2017-10-11 DIAGNOSIS — I49.5 SICK SINUS SYNDROME: ICD-10-CM

## 2017-10-11 PROCEDURE — 99215 OFFICE O/P EST HI 40 MIN: CPT | Mod: 25

## 2017-10-11 PROCEDURE — 93280 PM DEVICE PROGR EVAL DUAL: CPT

## 2017-10-11 RX ORDER — ATORVASTATIN CALCIUM 10 MG/1
10 TABLET, FILM COATED ORAL
Qty: 90 | Refills: 3 | Status: ACTIVE | COMMUNITY
Start: 2017-10-11

## 2017-10-11 RX ORDER — LINACLOTIDE 145 UG/1
145 CAPSULE, GELATIN COATED ORAL
Qty: 30 | Refills: 5 | Status: ACTIVE | COMMUNITY
Start: 2017-10-11

## 2018-04-11 ENCOUNTER — APPOINTMENT (OUTPATIENT)
Dept: CARDIOLOGY | Facility: CLINIC | Age: 83
End: 2018-04-11

## 2018-05-02 ENCOUNTER — APPOINTMENT (OUTPATIENT)
Dept: CARDIOLOGY | Facility: CLINIC | Age: 83
End: 2018-05-02
Payer: MEDICARE

## 2018-05-02 ENCOUNTER — NON-APPOINTMENT (OUTPATIENT)
Age: 83
End: 2018-05-02

## 2018-05-02 VITALS
SYSTOLIC BLOOD PRESSURE: 126 MMHG | HEIGHT: 65 IN | TEMPERATURE: 97.8 F | WEIGHT: 167 LBS | DIASTOLIC BLOOD PRESSURE: 73 MMHG | BODY MASS INDEX: 27.82 KG/M2 | HEART RATE: 60 BPM | OXYGEN SATURATION: 98 % | RESPIRATION RATE: 12 BRPM

## 2018-05-02 PROCEDURE — 99214 OFFICE O/P EST MOD 30 MIN: CPT

## 2018-07-27 ENCOUNTER — INPATIENT (INPATIENT)
Facility: HOSPITAL | Age: 83
LOS: 5 days | Discharge: NURSING, REHAB OR LONG-TERM CARE | End: 2018-08-02
Attending: HOSPITALIST | Admitting: HOSPITALIST
Payer: MEDICARE

## 2018-07-27 VITALS
HEART RATE: 60 BPM | OXYGEN SATURATION: 100 % | TEMPERATURE: 99 F | DIASTOLIC BLOOD PRESSURE: 78 MMHG | RESPIRATION RATE: 16 BRPM | SYSTOLIC BLOOD PRESSURE: 120 MMHG

## 2018-07-27 DIAGNOSIS — I63.9 CEREBRAL INFARCTION, UNSPECIFIED: ICD-10-CM

## 2018-07-27 DIAGNOSIS — Z95.828 PRESENCE OF OTHER VASCULAR IMPLANTS AND GRAFTS: Chronic | ICD-10-CM

## 2018-07-27 DIAGNOSIS — S22.39XA FRACTURE OF ONE RIB, UNSPECIFIED SIDE, INITIAL ENCOUNTER FOR CLOSED FRACTURE: ICD-10-CM

## 2018-07-27 DIAGNOSIS — E86.0 DEHYDRATION: ICD-10-CM

## 2018-07-27 DIAGNOSIS — K59.00 CONSTIPATION, UNSPECIFIED: ICD-10-CM

## 2018-07-27 DIAGNOSIS — E03.9 HYPOTHYROIDISM, UNSPECIFIED: ICD-10-CM

## 2018-07-27 DIAGNOSIS — R07.81 PLEURODYNIA: ICD-10-CM

## 2018-07-27 DIAGNOSIS — N39.0 URINARY TRACT INFECTION, SITE NOT SPECIFIED: ICD-10-CM
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
## 2018-07-27 DIAGNOSIS — Z29.9 ENCOUNTER FOR PROPHYLACTIC MEASURES, UNSPECIFIED: ICD-10-CM

## 2018-07-27 DIAGNOSIS — F03.90 UNSPECIFIED DEMENTIA WITHOUT BEHAVIORAL DISTURBANCE: ICD-10-CM

## 2018-07-27 DIAGNOSIS — I50.30 UNSPECIFIED DIASTOLIC (CONGESTIVE) HEART FAILURE: ICD-10-CM

## 2018-07-27 LAB
ALBUMIN SERPL ELPH-MCNC: 3.7 G/DL — SIGNIFICANT CHANGE UP (ref 3.3–5)
ALP SERPL-CCNC: 96 U/L — SIGNIFICANT CHANGE UP (ref 40–120)
ALT FLD-CCNC: 9 U/L — SIGNIFICANT CHANGE UP (ref 4–33)
APPEARANCE UR: SIGNIFICANT CHANGE UP
AST SERPL-CCNC: 17 U/L — SIGNIFICANT CHANGE UP (ref 4–32)
BASOPHILS # BLD AUTO: 0.04 K/UL — SIGNIFICANT CHANGE UP (ref 0–0.2)
BASOPHILS NFR BLD AUTO: 0.5 % — SIGNIFICANT CHANGE UP (ref 0–2)
BILIRUB SERPL-MCNC: 0.5 MG/DL — SIGNIFICANT CHANGE UP (ref 0.2–1.2)
BILIRUB UR-MCNC: NEGATIVE — SIGNIFICANT CHANGE UP
BLOOD UR QL VISUAL: HIGH
BUN SERPL-MCNC: 25 MG/DL — HIGH (ref 7–23)
CALCIUM SERPL-MCNC: 9 MG/DL — SIGNIFICANT CHANGE UP (ref 8.4–10.5)
CHLORIDE SERPL-SCNC: 105 MMOL/L — SIGNIFICANT CHANGE UP (ref 98–107)
CO2 SERPL-SCNC: 24 MMOL/L — SIGNIFICANT CHANGE UP (ref 22–31)
COLOR SPEC: YELLOW — SIGNIFICANT CHANGE UP
CREAT SERPL-MCNC: 0.84 MG/DL — SIGNIFICANT CHANGE UP (ref 0.5–1.3)
EOSINOPHIL # BLD AUTO: 0.18 K/UL — SIGNIFICANT CHANGE UP (ref 0–0.5)
EOSINOPHIL NFR BLD AUTO: 2.2 % — SIGNIFICANT CHANGE UP (ref 0–6)
GLUCOSE SERPL-MCNC: 101 MG/DL — HIGH (ref 70–99)
GLUCOSE UR-MCNC: NEGATIVE — SIGNIFICANT CHANGE UP
HCT VFR BLD CALC: 39.5 % — SIGNIFICANT CHANGE UP (ref 34.5–45)
HGB BLD-MCNC: 12.4 G/DL — SIGNIFICANT CHANGE UP (ref 11.5–15.5)
HYALINE CASTS # UR AUTO: SIGNIFICANT CHANGE UP (ref 0–?)
IMM GRANULOCYTES # BLD AUTO: 0.06 # — SIGNIFICANT CHANGE UP
IMM GRANULOCYTES NFR BLD AUTO: 0.7 % — SIGNIFICANT CHANGE UP (ref 0–1.5)
KETONES UR-MCNC: NEGATIVE — SIGNIFICANT CHANGE UP
LEUKOCYTE ESTERASE UR-ACNC: HIGH
LYMPHOCYTES # BLD AUTO: 1.34 K/UL — SIGNIFICANT CHANGE UP (ref 1–3.3)
LYMPHOCYTES # BLD AUTO: 16 % — SIGNIFICANT CHANGE UP (ref 13–44)
MCHC RBC-ENTMCNC: 31.4 % — LOW (ref 32–36)
MCHC RBC-ENTMCNC: 32 PG — SIGNIFICANT CHANGE UP (ref 27–34)
MCV RBC AUTO: 102.1 FL — HIGH (ref 80–100)
MONOCYTES # BLD AUTO: 0.79 K/UL — SIGNIFICANT CHANGE UP (ref 0–0.9)
MONOCYTES NFR BLD AUTO: 9.4 % — SIGNIFICANT CHANGE UP (ref 2–14)
MUCOUS THREADS # UR AUTO: SIGNIFICANT CHANGE UP
NEUTROPHILS # BLD AUTO: 5.96 K/UL — SIGNIFICANT CHANGE UP (ref 1.8–7.4)
NEUTROPHILS NFR BLD AUTO: 71.2 % — SIGNIFICANT CHANGE UP (ref 43–77)
NITRITE UR-MCNC: NEGATIVE — SIGNIFICANT CHANGE UP
NRBC # FLD: 0 — SIGNIFICANT CHANGE UP
PH UR: 6 — SIGNIFICANT CHANGE UP (ref 4.6–8)
PLATELET # BLD AUTO: 255 K/UL — SIGNIFICANT CHANGE UP (ref 150–400)
PMV BLD: 10.7 FL — SIGNIFICANT CHANGE UP (ref 7–13)
POTASSIUM SERPL-MCNC: 4.7 MMOL/L — SIGNIFICANT CHANGE UP (ref 3.5–5.3)
POTASSIUM SERPL-SCNC: 4.7 MMOL/L — SIGNIFICANT CHANGE UP (ref 3.5–5.3)
PROT SERPL-MCNC: 7.4 G/DL — SIGNIFICANT CHANGE UP (ref 6–8.3)
PROT UR-MCNC: 100 MG/DL — HIGH
RBC # BLD: 3.87 M/UL — SIGNIFICANT CHANGE UP (ref 3.8–5.2)
RBC # FLD: 13.6 % — SIGNIFICANT CHANGE UP (ref 10.3–14.5)
RBC CASTS # UR COMP ASSIST: SIGNIFICANT CHANGE UP (ref 0–?)
SODIUM SERPL-SCNC: 140 MMOL/L — SIGNIFICANT CHANGE UP (ref 135–145)
SP GR SPEC: 1.02 — SIGNIFICANT CHANGE UP (ref 1–1.04)
SQUAMOUS # UR AUTO: SIGNIFICANT CHANGE UP
TROPONIN T, HIGH SENSITIVITY: 17 NG/L — SIGNIFICANT CHANGE UP (ref ?–14)
TROPONIN T, HIGH SENSITIVITY: 18 NG/L — SIGNIFICANT CHANGE UP (ref ?–14)
UROBILINOGEN FLD QL: NORMAL MG/DL — SIGNIFICANT CHANGE UP
WBC # BLD: 8.37 K/UL — SIGNIFICANT CHANGE UP (ref 3.8–10.5)
WBC # FLD AUTO: 8.37 K/UL — SIGNIFICANT CHANGE UP (ref 3.8–10.5)
WBC UR QL: >50 — HIGH (ref 0–?)

## 2018-07-27 PROCEDURE — 73502 X-RAY EXAM HIP UNI 2-3 VIEWS: CPT | Mod: 26,RT

## 2018-07-27 PROCEDURE — 70450 CT HEAD/BRAIN W/O DYE: CPT | Mod: 26

## 2018-07-27 PROCEDURE — 71101 X-RAY EXAM UNILAT RIBS/CHEST: CPT | Mod: 26,RT

## 2018-07-27 RX ORDER — DOCUSATE SODIUM 100 MG
100 CAPSULE ORAL THREE TIMES A DAY
Qty: 0 | Refills: 0 | Status: DISCONTINUED | OUTPATIENT
Start: 2018-07-27 | End: 2018-08-02

## 2018-07-27 RX ORDER — PREGABALIN 225 MG/1
1000 CAPSULE ORAL DAILY
Qty: 0 | Refills: 0 | Status: DISCONTINUED | OUTPATIENT
Start: 2018-07-27 | End: 2018-08-02

## 2018-07-27 RX ORDER — ENOXAPARIN SODIUM 100 MG/ML
40 INJECTION SUBCUTANEOUS DAILY
Qty: 0 | Refills: 0 | Status: DISCONTINUED | OUTPATIENT
Start: 2018-07-27 | End: 2018-08-02

## 2018-07-27 RX ORDER — NYSTATIN CREAM 100000 [USP'U]/G
0 CREAM TOPICAL
Qty: 0 | Refills: 0 | COMMUNITY

## 2018-07-27 RX ORDER — FUROSEMIDE 40 MG
20 TABLET ORAL
Qty: 0 | Refills: 0 | Status: DISCONTINUED | OUTPATIENT
Start: 2018-07-27 | End: 2018-07-28

## 2018-07-27 RX ORDER — ASPIRIN/CALCIUM CARB/MAGNESIUM 324 MG
81 TABLET ORAL DAILY
Qty: 0 | Refills: 0 | Status: DISCONTINUED | OUTPATIENT
Start: 2018-07-27 | End: 2018-08-02

## 2018-07-27 RX ORDER — HYDROCORTISONE 1 %
1 OINTMENT (GRAM) TOPICAL
Qty: 0 | Refills: 0 | Status: DISCONTINUED | OUTPATIENT
Start: 2018-07-27 | End: 2018-07-27

## 2018-07-27 RX ORDER — ATORVASTATIN CALCIUM 80 MG/1
10 TABLET, FILM COATED ORAL AT BEDTIME
Qty: 0 | Refills: 0 | Status: DISCONTINUED | OUTPATIENT
Start: 2018-07-27 | End: 2018-08-02

## 2018-07-27 RX ORDER — ACETAMINOPHEN WITH CODEINE 300MG-30MG
1 TABLET ORAL EVERY 4 HOURS
Qty: 0 | Refills: 0 | Status: DISCONTINUED | OUTPATIENT
Start: 2018-07-27 | End: 2018-07-28

## 2018-07-27 RX ORDER — ACETAMINOPHEN 500 MG
650 TABLET ORAL EVERY 6 HOURS
Qty: 0 | Refills: 0 | Status: DISCONTINUED | OUTPATIENT
Start: 2018-07-27 | End: 2018-07-27

## 2018-07-27 RX ORDER — SODIUM CHLORIDE 9 MG/ML
1000 INJECTION INTRAMUSCULAR; INTRAVENOUS; SUBCUTANEOUS
Qty: 0 | Refills: 0 | Status: DISCONTINUED | OUTPATIENT
Start: 2018-07-27 | End: 2018-07-29

## 2018-07-27 RX ORDER — ERGOCALCIFEROL 1.25 MG/1
1 CAPSULE ORAL
Qty: 0 | Refills: 0 | COMMUNITY

## 2018-07-27 RX ORDER — LIDOCAINE 4 G/100G
1 CREAM TOPICAL ONCE
Qty: 0 | Refills: 0 | Status: DISCONTINUED | OUTPATIENT
Start: 2018-07-27 | End: 2018-07-30

## 2018-07-27 RX ORDER — LIDOCAINE 4 G/100G
1 CREAM TOPICAL ONCE
Qty: 0 | Refills: 0 | Status: COMPLETED | OUTPATIENT
Start: 2018-07-27 | End: 2018-07-27

## 2018-07-27 RX ORDER — AMIODARONE HYDROCHLORIDE 400 MG/1
100 TABLET ORAL DAILY
Qty: 0 | Refills: 0 | Status: DISCONTINUED | OUTPATIENT
Start: 2018-07-27 | End: 2018-08-02

## 2018-07-27 RX ORDER — LEVOTHYROXINE SODIUM 125 MCG
112 TABLET ORAL DAILY
Qty: 0 | Refills: 0 | Status: DISCONTINUED | OUTPATIENT
Start: 2018-07-27 | End: 2018-08-02

## 2018-07-27 RX ORDER — SENNA PLUS 8.6 MG/1
2 TABLET ORAL AT BEDTIME
Qty: 0 | Refills: 0 | Status: DISCONTINUED | OUTPATIENT
Start: 2018-07-27 | End: 2018-08-02

## 2018-07-27 RX ORDER — ACETAMINOPHEN 500 MG
650 TABLET ORAL ONCE
Qty: 0 | Refills: 0 | Status: DISCONTINUED | OUTPATIENT
Start: 2018-07-27 | End: 2018-07-27

## 2018-07-27 RX ORDER — SPIRONOLACTONE 25 MG/1
1 TABLET, FILM COATED ORAL
Qty: 0 | Refills: 0 | COMMUNITY

## 2018-07-27 RX ORDER — ATORVASTATIN CALCIUM 80 MG/1
10 TABLET, FILM COATED ORAL DAILY
Qty: 0 | Refills: 0 | Status: DISCONTINUED | OUTPATIENT
Start: 2018-07-27 | End: 2018-07-27

## 2018-07-27 RX ORDER — QUETIAPINE FUMARATE 200 MG/1
25 TABLET, FILM COATED ORAL DAILY
Qty: 0 | Refills: 0 | Status: DISCONTINUED | OUTPATIENT
Start: 2018-07-27 | End: 2018-07-28

## 2018-07-27 RX ORDER — AMIODARONE HYDROCHLORIDE 400 MG/1
100 TABLET ORAL DAILY
Qty: 0 | Refills: 0 | Status: DISCONTINUED | OUTPATIENT
Start: 2018-07-27 | End: 2018-07-27

## 2018-07-27 RX ORDER — SPIRONOLACTONE 25 MG/1
12.5 TABLET, FILM COATED ORAL
Qty: 0 | Refills: 0 | Status: DISCONTINUED | OUTPATIENT
Start: 2018-07-27 | End: 2018-07-28

## 2018-07-27 RX ADMIN — Medication 1 TABLET(S): at 23:23

## 2018-07-27 RX ADMIN — LIDOCAINE 1 PATCH: 4 CREAM TOPICAL at 17:37

## 2018-07-27 RX ADMIN — Medication 1 TABLET(S): at 19:15

## 2018-07-27 NOTE — ED PROVIDER NOTE - ATTENDING CONTRIBUTION TO CARE
I performed a face-to-face evaluation of the patient and performed a history and physical examination. I agree with the history and physical examination.    89 F, dementia, fall, ? syncope, c/o pain R rib area. No HA/CP/SOB/UTI Sx. TTP R rib and R hip area. Check head CT, R hip and ribs xray. Pain control. Labs (? Dehydration).

## 2018-07-27 NOTE — ED PROVIDER NOTE - MEDICAL DECISION MAKING DETAILS
Maricel: 89 F, dementia, fall, ? syncope, c/o pain R rib area. No HA/CP/SOB/UTI Sx. TTP R rib and R hip area. Check head CT, R hip and ribs xray. Pain control. Labs (? Dehydration).

## 2018-07-27 NOTE — H&P ADULT - PROBLEM SELECTOR PLAN 1
- Hx and mechanism not consistent with syncopal/vasovagal episode, rather more in line w/ mechanical fall. Fall not preceded w/ any CP, SOB, palpitations, HA or dizziness.   - EKG w/ no ST elevations, depressions or T wave inversions- intervals wnl. PPM eval in AM to assess for any events that may have preceded her fall  - CT Head negative for any ICH. - Hx and mechanism not consistent with syncopal/vasovagal episode, rather more in line w/ mechanical fall. Fall not preceded w/ any CP, SOB, palpitations, HA or dizziness.   - EKG w/ no ST elevations, depressions or T wave inversions- intervals wnl. PPM eval in AM to assess for any events that may have preceded her fall  - CT Head negative for any ICH.  - fall precautions, PT consult - Minimally displaced right lateral 5th rib fracture 2/2 mechanical fall  - Pain well controlled w/ Lidocaine patch and Tylenol 650mg q6h. Mild discomfort w/ inspiration  - CXR w/ no evidence of pneumothorax.  - Incentive spirometry

## 2018-07-27 NOTE — ED PROVIDER NOTE - OBJECTIVE STATEMENT
89F hx hypothyroidism, CHF, dementia, macular degeneration, CVA, c/o of unwitnessed fall at approx 6:30AM while attempting to use the commode. Patient is an unreliable historian and has short term memory loss per family at bedside. No reported fa 89F hx hypothyroidism, CHF, dementia, macular degeneration, CVA, c/o of unwitnessed fall at approx 6:30AM while attempting to use the commode. Patient is an unreliable historian and has short term memory loss per family at bedside. No reported falls prior to this. Pt endorses right side chest wall pain, no sob or dizziness. No known recent illness or fevers. Has 24 aide at home.    med hx: levothyroxine 112mcg 6am  lorazepam 1/4mg 7am  25mg seroquel  20mg furosemid 9am 3x/week  10mg atorvastatin am  12.5mg spironolactone am  500mg methenamine 9am  100mg amiodarone 9am  1 ASA 81mg 89F hx hypothyroidism, PPM, CHF, dementia, macular degeneration, CVA, c/o of unwitnessed fall at approx 6:30AM while attempting to use the commode. Patient is an unreliable historian and has short term memory loss per family at bedside. No reported falls prior to this. Pt endorses right side chest wall pain, no sob or dizziness. No known recent illness or fevers. Has 24 aide at home.    med hx: levothyroxine 112mcg 6am  lorazepam 1/4mg 7am  25mg seroquel  20mg furosemid 9am 3x/week  10mg atorvastatin am  12.5mg spironolactone am  500mg methenamine 9am  100mg amiodarone 9am  1 ASA 81mg 89F hx hypothyroidism, PPM, CHF, dementia (AAOx2 at baseline), macular degeneration, hemorrhagic CVA, b/l DVT off AC since 2014 following CVA c/o of unwitnessed fall at approx 6:30AM while attempting to use the commode. Patient is an unreliable historian and has short term memory loss per family at bedside. No reported falls prior to this. Pt endorses right side chest wall pain, no sob or dizziness. No known recent illness or fevers. Has 24 aide at home.    med hx: levothyroxine 112mcg 6am  lorazepam 1/4mg 7am  25mg seroquel  20mg furosemid 9am 3x/week  10mg atorvastatin am  12.5mg spironolactone am  500mg methenamine 9am  100mg amiodarone 9am  1 ASA 81mg

## 2018-07-27 NOTE — H&P ADULT - PROBLEM SELECTOR PLAN 2
- Minimally displaced right lateral 5th rib fracture 2/2 mechanical fall  - Pain well controlled w/ Lidocaine patch and Tylenol 650mg q6h. Mild discomfort w/ inspiration  - CXR w/ no evidence of pneumothorax. - Minimally displaced right lateral 5th rib fracture 2/2 mechanical fall  - Pain well controlled w/ Lidocaine patch and Tylenol 650mg q6h. Mild discomfort w/ inspiration  - CXR w/ no evidence of pneumothorax.  - Incentive spirometry - Hx and mechanism not consistent with syncopal/vasovagal episode, rather more in line w/ mechanical fall. Fall not preceded w/ any CP, SOB, palpitations, HA or dizziness.   - EKG w/ no ST elevations, depressions or T wave inversions- intervals wnl. PPM eval in AM to assess for any events that may have preceded her fall  - CT Head negative for any ICH.  - fall precautions, PT consult

## 2018-07-27 NOTE — H&P ADULT - PROBLEM SELECTOR PLAN 4
- Likely component of dehydration as well that could have contributed to fall as pt w/ dry mucous membranes, Bun:Cr>20, with recent decreased PO intake.  - Encourage PO hydration. Will hold on maintenance fluids given hx of HF  - Nutrition consult - Likely component of dehydration as well that could have contributed to fall as pt w/ dry mucous membranes, Bun: Cr>20, with recent decreased PO intake.  - Encourage PO hydration. NS @50cc/h stop after 10h.  - Nutrition consult - Likely component of dehydration as well that could have contributed to fall as pt w/ dry mucous membranes, Bun: Cr>20, with recent decreased PO intake.  - Encourage PO hydration. NS @50cc/h stop after 10h.  - Nutrition consult  - per HHA, at bedside, patient on thickened liquids at home and small chopped regular diet; aspiration precautions, dysphagia diet with thickened liquids

## 2018-07-27 NOTE — H&P ADULT - NSHPSOCIALHISTORY_GEN_ALL_CORE
Lives with Son in [facility]  No reported tobacco, ETOH abuse, or recreational drug abuse  Requires assistance w/ ADL's Lives with Son in [facility]; has 24/7 HHA  No reported tobacco, ETOH abuse, or recreational drug abuse  Requires assistance w/ ADL's; ambulates with walker

## 2018-07-27 NOTE — ED ADULT NURSE NOTE - CHIEF COMPLAINT QUOTE
c/o right side flank and hip pain s/p mechanical fall this A.M., denies any LOC or blood thinner use.  Took Tylenol for pain.  Son and aide present in [facility].

## 2018-07-27 NOTE — ED ADULT NURSE NOTE - OBJECTIVE STATEMENT
Received pt. in room 28 alert and oriented x1 verbal but confuse presenting to the ER with right side and right hip pain. Patient son at bedside stated that his mother fell early this morning while walking to the bedside commode in her bedroom. Right hip with ecchymosis leg is straight no rotation noted. No loss of consciousness as verbalized by son. Labs sent placed on cardiac monitor. Medicated as ordered, will continue to monitor.

## 2018-07-27 NOTE — H&P ADULT - PROBLEM SELECTOR PLAN 8
- c/w home dose of Seroquel 25mg PO QD and 0.5 Ativan prn agitation - c/w home dose of Seroquel 25mg PO QD, 0.25 Ativan prn agitation  - iSTOP Reference #: [id number]

## 2018-07-27 NOTE — H&P ADULT - ASSESSMENT
89 y/o F w/ a pmh significant for hypothyroidism, PPM, HFpEF (mild diastolic dysfunction 2016), dementia (AAOx2 at baseline), macular degeneration, hemorrhagic CVA, recurrent UTI, b/l DVT (off AC since 2014 following CVA) 89 y/o F w/ a pmh significant for hypothyroidism, PPM, HFpEF (mild diastolic dysfunction 2016), dementia (AAOx2 at baseline), macular degeneration, hemorrhagic CVA, recurrent UTI, b/l DVT (off AC since 2014 following CVA) admitted s/p mechanical fall c/b Rib fracture, asymptomatic bacteruria,  and dehydration 89 y/o F w/hypothyroidism, PPM, HFpEF (mild diastolic dysfunction 2016), dementia (AAOx2 at baseline), macular degeneration, hemorrhagic CVA, recurrent UTI, b/l DVT (off AC since 2014 following CVA) admitted s/p mechanical fall c/b Rib fracture found to have asymptomatic pyuria.

## 2018-07-27 NOTE — H&P ADULT - NSHPREVIEWOFSYSTEMS_GEN_ALL_CORE
REVIEW OF SYSTEMS:    CONSTITUTIONAL: Denies any fatigue, weakness, fevers or chills  EYES/ENT: No visual changes;  No vertigo or throat pain   NECK: No pain or stiffness  RESPIRATORY: No cough, wheezing, hemoptysis; No shortness of breath  CARDIOVASCULAR: No chest pain or palpitations  GASTROINTESTINAL: No abdominal or epigastric pain. No nausea, vomiting, or hematemesis; No diarrhea or constipation. No melena or hematochezia.  GENITOURINARY: No dysuria, frequency or hematuria  NEUROLOGICAL: + Fall  SKIN: No itching, burning, rashes, or lesions   All other review of systems is negative unless indicated above. REVIEW OF SYSTEMS:    CONSTITUTIONAL: Denies any fatigue, weakness, fevers or chills  EYES/ENT: No visual changes;  No vertigo or throat pain, no coughing with eating  NECK: No pain or stiffness  RESPIRATORY: No cough, wheezing, hemoptysis; No shortness of breath  CARDIOVASCULAR: No chest pain or palpitations  GASTROINTESTINAL: No abdominal or epigastric pain. No nausea, vomiting, or hematemesis; No diarrhea or constipation. No melena or hematochezia.  GENITOURINARY: No dysuria, frequency or hematuria, no suprapubic pain  NEUROLOGICAL: + Fall  SKIN: No itching, burning, rashes, or lesions   All other review of systems is negative unless indicated above.

## 2018-07-27 NOTE — H&P ADULT - NSHPPHYSICALEXAM_GEN_ALL_CORE
PHYSICAL EXAM:    General: No acute distress.  HEENT: Normocephalic, atraumatic.  PERRL.  EOMI.  No scleral icterus.  Moist MM.  No oropharyngeal exudates.    Neck: Supple.  Full range of motion.  No JVD.  No carotid bruits.  No thyromegaly.  Trachea midline.  No lymphadenopathy.   Heart: RRR.  Normal S1 and S2.  No murmurs, rubs, or gallops.   Lungs: CTAB. No wheezes, crackles, or rhonchi.    Abdomen: BS+, soft, NT/ND.  No organomegaly.  Skin: Warm and dry.  No rashes.  Extremities: No edema, clubbing, or cyanosis.  2+ peripheral pulses b/l.  Musculoskeletal: No deformities.  No spinal or paraspinal tenderness.  Neuro: A&Ox3.  CN II-XII intact.  5/5 strength in UE and LE b/l.  Tactile sensation intact in UE and LE b/l.  Cerebellar function intact as assessed by finger-to-nose test. PHYSICAL EXAM:    General: No acute distress. Sitting upright in bed  HEENT: Normocephalic, atraumatic.  Dry mucous membranes    Neck: Supple.  Full range of motion.   No lymphadenopathy.   Heart: RRR.  Normal S1 and S2.  No murmurs, rubs, or gallops.   Lungs: CTAB. No wheezes, crackles, or rhonchi.    Abdomen: BS+, soft, NT/ND.  No organomegaly.  Skin: Warm and dry.  No rashes.  Extremities: No edema, clubbing, or cyanosis.  2+ peripheral pulses b/l.  Musculoskeletal: No deformities.  No spinal or paraspinal tenderness.  Neuro: CN2-12 intact. AAOx2 PHYSICAL EXAM:    General: No acute distress. Sitting upright in bed  HEENT: Normocephalic, atraumatic.  Dry mucous membranes    Neck: Supple.  Full range of motion.   No lymphadenopathy.   Heart: RRR.  Normal S1 and S2.  No murmurs, rubs, or gallops.   Lungs: CTAB. No wheezes, crackles, or rhonchi.  Pain to palpation under R breast  Abdomen: BS+, soft, NT/ND.  No organomegaly.  Skin: Warm and dry.  No rashes.  Extremities: No edema, clubbing, or cyanosis.  2+ peripheral pulses b/l.  Musculoskeletal: No deformities.  No spinal or paraspinal tenderness.  Neuro: CN2-12 intact. AAOx2

## 2018-07-27 NOTE — H&P ADULT - PROBLEM SELECTOR PLAN 3
- UA + LE and 50>WBC which could potentially have precipitated this event however pt afebrile, non toxic appearing, no suprapubic tenderness/dysuria, or leukocytosis at this time.   - Monitor off abx at this time. Ucx pending. - UA + LE and 50>WBC which could potentially have precipitated this event however pt afebrile, non toxic appearing, no suprapubic tenderness/dysuria, or leukocytosis at this time.   - Squamous epithelium in UA pointing towards contaminant. Monitor off abx at this time. Ucx pending. Asymptomatic   - UA + LE and 50>WBC which could potentially have precipitated this event however pt afebrile, non toxic appearing, no suprapubic tenderness/dysuria, or leukocytosis at this time.   - Squamous epithelium in UA pointing towards dirty catch. Monitor off abx at this time. f/u UCx, treat if positive.

## 2018-07-27 NOTE — ED PROVIDER NOTE - PROGRESS NOTE DETAILS
Mauricio PGY2: Pt complaining of signficant right sided rib pain with slight movements- no resp distress or tachypnea but pt unable to stand up, even with help of aids- pt has 24hr home care but would not be a safe discharge, spoken to Dr Hill' office, pcp does not admit here.

## 2018-07-27 NOTE — H&P ADULT - PROBLEM SELECTOR PLAN 5
- Last TTE 5/2016 w/ EF 75-80% and Stage 1 Diastolic dysfunction  - Euvolemic on physical exam today. No evidence of JVD or b/l LE edema  - c/w home Amiodarone 100mg PO QD, Furosemide 20mg PO and Spironolactone 12.5mg PO every Mon, Thurs, Sun per home regimen  - Monitor i's/o's. Daily weights.   - Hold for SBP <110

## 2018-07-27 NOTE — H&P ADULT - HISTORY OF PRESENT ILLNESS
89F hx hypothyroidism, PPM, HFpEF (mild diastolic dysfunction 2016), dementia   (AAOx2 at baseline), macular degeneration, hemorrhagic CVA, recurrent UTI, b/l DVT   off AC since 2014 following CVA presenting following an unwitnessed fall. Pt says   she was using the commode this AM and..........Denies any LOC, dizziness, CP,   palpitations, SOB prior to her fall. She says she fell on --------- and   immediately felt right sided flank/hip pain. Her rib pain is exacerbated w/ sharp   movements and deep inspiration. No reported fevers, chills, recent URI, cough, abd   pain, dysuria, or diarrhea.     In the ED, pt afebrile (98.6F), -163/65-78, HR 60-64, RR 17 (100%RA). Labs   notable for UA w/ large LE and >50WBC. cTH negative. Xray ribs shows minimally   displaced right lateral 5th rib fracture. Pt given Lidocaine patch and admitted to   medicine 89 y/o F w/ a pmh significant for hypothyroidism, PPM, HFpEF (mild diastolic dysfunction 2016), dementia (AAOx2 at baseline), macular degeneration, hemorrhagic CVA, recurrent UTI, b/l DVT (off AC since 2014 following CVA) presenting following an unwitnessed fall. Pt was laying in bed this AM and went to get up, as she normally does, using the handle bars of her commode near the bed to assist her. Pt R hand reportedly slipped off of the handle bar while she was getting up and she fell to her R side. She fell into the wall and then down to the ground. Pt did not hit her head and was completely awake/aware throughout this entire encounter. Denies any LOC, dizziness, CP, palpitations, SOB prior to her fall. Pt was helped back up to her bed but began complaining of severe R rib pain. She described the pain as a sharp, constant non radiating pain exacerbated w/ sharp movements and deep inspiration. No reported fevers, chills, recent URI, cough, abd pain, dysuria, or diarrhea.     In the ED, pt afebrile (98.6F), -163/65-78, HR 60-64, RR 17 (100%RA). Labs   notable for UA w/ large LE and >50WBC. CTH negative. Xray ribs shows minimally displaced right lateral 5th rib fracture. Pt given Lidocaine patch and admitted to medicine 89 y/o F w/hypothyroidism, PPM, HFpEF (mild diastolic dysfunction 2016), dementia (AAOx2 at baseline), macular degeneration, hemorrhagic CVA, recurrent UTI, b/l DVT (off AC since 2014 following CVA) presenting following an unwitnessed fall. Pt was laying in bed this AM and went to get up, as she normally does, using the handle bars of her commode near the bed to assist her. Pt R hand reportedly slipped off of the handle bar while she was getting up and she fell to her R side. She fell into the wall and then down to the ground. Pt did not hit her head and was completely awake/aware throughout this entire encounter. Denies any LOC, dizziness, CP, palpitations, SOB prior to her fall. Pt was helped back up to her bed but began complaining of severe R rib pain. She described the pain as a sharp, constant non radiating pain exacerbated w/ sharp movements and deep inspiration. No reported fevers, chills, recent URI, cough, abd pain, dysuria, or diarrhea.     In the ED, pt afebrile (98.6F), -163/65-78, HR 60-64, RR 17 (100%RA). Labs   notable for UA w/ large LE and >50WBC. CTH negative. Xray ribs shows minimally displaced right lateral 5th rib fracture. Pt given Lidocaine patch and admitted to medicine

## 2018-07-27 NOTE — H&P ADULT - NSHPLABSRESULTS_GEN_ALL_CORE
12.4   8.37  )-----------( 255      ( 2018 17:37 )             39.5           140  |  105  |  25<H>  ----------------------------<  101<H>  4.7   |  24  |  0.84    Ca    9.0      2018 17:37    TPro  7.4  /  Alb  3.7  /  TBili  0.5  /  DBili  x   /  AST  17  /  ALT  9   /  AlkPhos  96                Urinalysis Basic - ( 2018 19:40 )    Color: YELLOW / Appearance: HAZY / S.025 / pH: 6.0  Gluc: NEGATIVE / Ketone: NEGATIVE  / Bili: NEGATIVE / Urobili: NORMAL mg/dL   Blood: TRACE / Protein: 100 mg/dL / Nitrite: NEGATIVE   Leuk Esterase: LARGE / RBC: 2-5 / WBC >50   Sq Epi: MOD / Non Sq Epi: x / Bacteria: x            Lactate Trend            CAPILLARY BLOOD GLUCOSE          < from: Xray Chest 1 View AP/PA (18 @ 18:29) >        INTERPRETATION:  No focal consolidation.  Follow-up official report.    `    < end of copied text >    < from: Xray Ribs 3 Views, Right (18 @ 18:29) >    INTERPRETATION:  Acute, minimally displaced right lateral 5th rib   fracture.  Follow-up official report.        < end of copied text >    < from: CT Head No Cont (18 @ 18:40) >      IMPRESSION:    No acute intracranial hemorrhage or mass effect. No displaced calvarial   fracture.    < end of copied text >     140  |  105  |  25<H>  ----------------------------<  101<H>  4.7   |  24  |  0.84    Ca    9.0      2018 17:37    TPro  7.4  /  Alb  3.7  /  TBili  0.5  /  DBili  x   /  AST  17  /  ALT  9   /  AlkPhos  96                          12.4   8.37  )-----------( 255      ( 2018 17:37 )             39.5     LIVER FUNCTIONS - ( 2018 17:37 )  Alb: 3.7 g/dL / Pro: 7.4 g/dL / ALK PHOS: 96 u/L / ALT: 9 u/L / AST: 17 u/L / GGT: x           Urinalysis Basic - ( 2018 19:40 )  Color: YELLOW / Appearance: HAZY / S.025 / pH: 6.0  Gluc: NEGATIVE / Ketone: NEGATIVE  / Bili: NEGATIVE / Urobili: NORMAL mg/dL   Blood: TRACE / Protein: 100 mg/dL / Nitrite: NEGATIVE   Leuk Esterase: LARGE / RBC: 2-5 / WBC >50   Sq Epi: MOD / Non Sq Epi: x / Bacteria: x    Troponin T, High Sensitivity (18 @ 19:13): 17 ng/L  Troponin T, High Sensitivity Result (18 @ 17:37): 18  ng/L    CXR personally reviewed - clear lungs; PPM; R rib fx    < from: Xray Ribs 3 Views, Right (18 @ 18:29) >  ******PRELIMINARY REPORT****** INTERPRETATION:  Acute, minimally displaced right lateral 5th rib fracture. Follow-up official report.  < end of copied text >    < from: Xray Hip w/ Pelvis 2 or 3 Views, Right (18 @ 18:28) >  ******PRELIMINARY REPORT******  INTERPRETATION:  No acute fracture or dislocation.  < end of copied text >    < from: CT Head No Cont (18 @ 18:40) >  There is no acute intracranial hemorrhage, extra-axial fluid collection, hydrocephalus, mass effect or midline shift. Chronic bilateral basal ganglia infarcts. Mild to moderate white matter microvascular ischemic disease. No displaced calvarial fracture. The visualized paranasal sinuses and mastoid air cells are clear.  IMPRESSION: No acute intracranial hemorrhage or mass effect. No displaced calvarial fracture.  < end of copied text >    EKG personally reviewed - A paced, PVCs, QTc 466ms; Q in III, aVF

## 2018-07-27 NOTE — ED PROVIDER NOTE - MUSCULOSKELETAL, MLM
Spine appears normal, range of motion is not limited, + right 5th lateral rib TTP, + right lateral hip TTP, however able to fully range hip with minimal pain, no pain on axial loading or pelvic compression, compartments soft, no midline spinal tenderness.

## 2018-07-27 NOTE — PATIENT PROFILE ADULT. - ABILITY TO HEAR (WITH HEARING AID OR HEARING APPLIANCE IF NORMALLY USED):
Mildly to Moderately Impaired: difficulty hearing in some environments or speaker may need to increase volume or speak distinctly/pt uses hearing aide, not with patient at this time

## 2018-07-27 NOTE — ED ADULT TRIAGE NOTE - CHIEF COMPLAINT QUOTE
c/o right side flank and hip pain s/p mechanical fall this A.M., denies any LOC or blood thinner use.  Took Tylenol for pain.  Son and aide present in [facility].

## 2018-07-27 NOTE — H&P ADULT - PROBLEM SELECTOR PLAN 10
- Diet: regular  - DVT PPx: Lovenox 40mg QD  - DNR/DNI - Diet: dysphagia diet, thickened liquids; aspiration precautions   - DVT PPx: Lovenox 40mg QD  - DNR/DNI

## 2018-07-28 ENCOUNTER — TRANSCRIPTION ENCOUNTER (OUTPATIENT)
Age: 83
End: 2018-07-28

## 2018-07-28 DIAGNOSIS — N39.0 URINARY TRACT INFECTION, SITE NOT SPECIFIED: ICD-10-CM

## 2018-07-28 LAB
BUN SERPL-MCNC: 24 MG/DL — HIGH (ref 7–23)
CALCIUM SERPL-MCNC: 8.4 MG/DL — SIGNIFICANT CHANGE UP (ref 8.4–10.5)
CHLORIDE SERPL-SCNC: 105 MMOL/L — SIGNIFICANT CHANGE UP (ref 98–107)
CO2 SERPL-SCNC: 21 MMOL/L — LOW (ref 22–31)
CREAT SERPL-MCNC: 0.77 MG/DL — SIGNIFICANT CHANGE UP (ref 0.5–1.3)
FOLATE SERPL-MCNC: > 20 NG/ML — HIGH (ref 4.7–20)
GLUCOSE SERPL-MCNC: 113 MG/DL — HIGH (ref 70–99)
HCT VFR BLD CALC: 37.4 % — SIGNIFICANT CHANGE UP (ref 34.5–45)
HGB BLD-MCNC: 12 G/DL — SIGNIFICANT CHANGE UP (ref 11.5–15.5)
MAGNESIUM SERPL-MCNC: 2.2 MG/DL — SIGNIFICANT CHANGE UP (ref 1.6–2.6)
MCHC RBC-ENTMCNC: 32.1 % — SIGNIFICANT CHANGE UP (ref 32–36)
MCHC RBC-ENTMCNC: 33.4 PG — SIGNIFICANT CHANGE UP (ref 27–34)
MCV RBC AUTO: 104.2 FL — HIGH (ref 80–100)
NRBC # FLD: 0 — SIGNIFICANT CHANGE UP
PHOSPHATE SERPL-MCNC: 3.8 MG/DL — SIGNIFICANT CHANGE UP (ref 2.5–4.5)
PLATELET # BLD AUTO: 224 K/UL — SIGNIFICANT CHANGE UP (ref 150–400)
PMV BLD: 10.9 FL — SIGNIFICANT CHANGE UP (ref 7–13)
POTASSIUM SERPL-MCNC: 4 MMOL/L — SIGNIFICANT CHANGE UP (ref 3.5–5.3)
POTASSIUM SERPL-SCNC: 4 MMOL/L — SIGNIFICANT CHANGE UP (ref 3.5–5.3)
RBC # BLD: 3.59 M/UL — LOW (ref 3.8–5.2)
RBC # FLD: 13.8 % — SIGNIFICANT CHANGE UP (ref 10.3–14.5)
SODIUM SERPL-SCNC: 141 MMOL/L — SIGNIFICANT CHANGE UP (ref 135–145)
TSH SERPL-MCNC: 1.21 UIU/ML — SIGNIFICANT CHANGE UP (ref 0.27–4.2)
VIT B12 SERPL-MCNC: 1029 PG/ML — HIGH (ref 200–900)
WBC # BLD: 6.52 K/UL — SIGNIFICANT CHANGE UP (ref 3.8–10.5)
WBC # FLD AUTO: 6.52 K/UL — SIGNIFICANT CHANGE UP (ref 3.8–10.5)

## 2018-07-28 PROCEDURE — 99223 1ST HOSP IP/OBS HIGH 75: CPT | Mod: GC

## 2018-07-28 RX ORDER — OXYCODONE AND ACETAMINOPHEN 5; 325 MG/1; MG/1
0.5 TABLET ORAL
Qty: 0 | Refills: 0 | Status: DISCONTINUED | OUTPATIENT
Start: 2018-07-28 | End: 2018-07-28

## 2018-07-28 RX ORDER — CHOLECALCIFEROL (VITAMIN D3) 125 MCG
400 CAPSULE ORAL DAILY
Qty: 0 | Refills: 0 | Status: DISCONTINUED | OUTPATIENT
Start: 2018-07-28 | End: 2018-08-02

## 2018-07-28 RX ORDER — ACETAMINOPHEN 500 MG
1000 TABLET ORAL ONCE
Qty: 0 | Refills: 0 | Status: COMPLETED | OUTPATIENT
Start: 2018-07-28 | End: 2018-07-29

## 2018-07-28 RX ORDER — FUROSEMIDE 40 MG
20 TABLET ORAL
Qty: 0 | Refills: 0 | Status: DISCONTINUED | OUTPATIENT
Start: 2018-07-28 | End: 2018-08-02

## 2018-07-28 RX ORDER — OXYCODONE AND ACETAMINOPHEN 5; 325 MG/1; MG/1
2 TABLET ORAL EVERY 6 HOURS
Qty: 0 | Refills: 0 | Status: DISCONTINUED | OUTPATIENT
Start: 2018-07-28 | End: 2018-07-28

## 2018-07-28 RX ORDER — NITROFURANTOIN MACROCRYSTAL 50 MG
1 CAPSULE ORAL
Qty: 0 | Refills: 0 | COMMUNITY
Start: 2018-07-28

## 2018-07-28 RX ORDER — SPIRONOLACTONE 25 MG/1
12.5 TABLET, FILM COATED ORAL
Qty: 0 | Refills: 0 | Status: DISCONTINUED | OUTPATIENT
Start: 2018-07-28 | End: 2018-08-02

## 2018-07-28 RX ORDER — FUROSEMIDE 40 MG
20 TABLET ORAL
Qty: 0 | Refills: 0 | Status: DISCONTINUED | OUTPATIENT
Start: 2018-07-28 | End: 2018-07-28

## 2018-07-28 RX ORDER — POLYETHYLENE GLYCOL 3350 17 G/17G
17 POWDER, FOR SOLUTION ORAL DAILY
Qty: 0 | Refills: 0 | Status: DISCONTINUED | OUTPATIENT
Start: 2018-07-28 | End: 2018-08-02

## 2018-07-28 RX ORDER — SODIUM CHLORIDE 9 MG/ML
1000 INJECTION INTRAMUSCULAR; INTRAVENOUS; SUBCUTANEOUS
Qty: 0 | Refills: 0 | Status: DISCONTINUED | OUTPATIENT
Start: 2018-07-28 | End: 2018-08-02

## 2018-07-28 RX ORDER — QUETIAPINE FUMARATE 200 MG/1
25 TABLET, FILM COATED ORAL AT BEDTIME
Qty: 0 | Refills: 0 | Status: DISCONTINUED | OUTPATIENT
Start: 2018-07-28 | End: 2018-07-31

## 2018-07-28 RX ORDER — NITROFURANTOIN MACROCRYSTAL 50 MG
100 CAPSULE ORAL
Qty: 0 | Refills: 0 | Status: DISCONTINUED | OUTPATIENT
Start: 2018-07-28 | End: 2018-07-29

## 2018-07-28 RX ORDER — OXYCODONE AND ACETAMINOPHEN 5; 325 MG/1; MG/1
0.5 TABLET ORAL EVERY 6 HOURS
Qty: 0 | Refills: 0 | Status: DISCONTINUED | OUTPATIENT
Start: 2018-07-28 | End: 2018-07-29

## 2018-07-28 RX ADMIN — OXYCODONE AND ACETAMINOPHEN 0.5 TABLET(S): 5; 325 TABLET ORAL at 17:44

## 2018-07-28 RX ADMIN — SODIUM CHLORIDE 50 MILLILITER(S): 9 INJECTION INTRAMUSCULAR; INTRAVENOUS; SUBCUTANEOUS at 14:05

## 2018-07-28 RX ADMIN — Medication 81 MILLIGRAM(S): at 12:05

## 2018-07-28 RX ADMIN — Medication 81 MILLIGRAM(S): at 00:17

## 2018-07-28 RX ADMIN — PREGABALIN 1000 MICROGRAM(S): 225 CAPSULE ORAL at 12:05

## 2018-07-28 RX ADMIN — SENNA PLUS 2 TABLET(S): 8.6 TABLET ORAL at 22:53

## 2018-07-28 RX ADMIN — SODIUM CHLORIDE 50 MILLILITER(S): 9 INJECTION INTRAMUSCULAR; INTRAVENOUS; SUBCUTANEOUS at 01:06

## 2018-07-28 RX ADMIN — Medication 400 UNIT(S): at 17:44

## 2018-07-28 RX ADMIN — ENOXAPARIN SODIUM 40 MILLIGRAM(S): 100 INJECTION SUBCUTANEOUS at 00:17

## 2018-07-28 RX ADMIN — Medication 1 TABLET(S): at 04:03

## 2018-07-28 RX ADMIN — QUETIAPINE FUMARATE 25 MILLIGRAM(S): 200 TABLET, FILM COATED ORAL at 22:53

## 2018-07-28 RX ADMIN — AMIODARONE HYDROCHLORIDE 100 MILLIGRAM(S): 400 TABLET ORAL at 09:38

## 2018-07-28 RX ADMIN — OXYCODONE AND ACETAMINOPHEN 0.5 TABLET(S): 5; 325 TABLET ORAL at 18:45

## 2018-07-28 RX ADMIN — Medication 100 MILLIGRAM(S): at 13:52

## 2018-07-28 RX ADMIN — ENOXAPARIN SODIUM 40 MILLIGRAM(S): 100 INJECTION SUBCUTANEOUS at 12:05

## 2018-07-28 RX ADMIN — ATORVASTATIN CALCIUM 10 MILLIGRAM(S): 80 TABLET, FILM COATED ORAL at 22:53

## 2018-07-28 RX ADMIN — Medication 100 MILLIGRAM(S): at 00:18

## 2018-07-28 RX ADMIN — Medication 5 MILLIGRAM(S): at 17:44

## 2018-07-28 RX ADMIN — Medication 1 TABLET(S): at 10:40

## 2018-07-28 RX ADMIN — Medication 100 MILLIGRAM(S): at 22:53

## 2018-07-28 RX ADMIN — QUETIAPINE FUMARATE 25 MILLIGRAM(S): 200 TABLET, FILM COATED ORAL at 00:17

## 2018-07-28 RX ADMIN — Medication 1 TABLET(S): at 09:38

## 2018-07-28 RX ADMIN — LIDOCAINE 1 PATCH: 4 CREAM TOPICAL at 05:00

## 2018-07-28 RX ADMIN — Medication 100 MILLIGRAM(S): at 17:44

## 2018-07-28 RX ADMIN — SENNA PLUS 2 TABLET(S): 8.6 TABLET ORAL at 00:17

## 2018-07-28 RX ADMIN — Medication 1 TABLET(S): at 13:52

## 2018-07-28 RX ADMIN — Medication 100 MILLIGRAM(S): at 06:36

## 2018-07-28 RX ADMIN — POLYETHYLENE GLYCOL 3350 17 GRAM(S): 17 POWDER, FOR SOLUTION ORAL at 13:52

## 2018-07-28 RX ADMIN — Medication 112 MICROGRAM(S): at 06:36

## 2018-07-28 NOTE — PROGRESS NOTE ADULT - PROBLEM SELECTOR PLAN 1
- Minimally displaced right lateral 5th rib fracture 2/2 mechanical fall  - Pain well controlled w/ Lidocaine patch and Tylenol 650mg q6h. Mild discomfort w/ inspiration  - CXR w/ no evidence of pneumothorax.  - Incentive spirometry

## 2018-07-28 NOTE — DISCHARGE NOTE ADULT - HOSPITAL COURSE
The patient was admitted to the hospital after an unwitnessed fall. Her head CT and xrays of the hip were both negative but her chest xray showed a minimally displaced fracture of the right 5th rib. She was given a lidocaine patch and started on tylenol for pain control. As the patient does not remember the incident due to her dementia, a work up for cardiac etiologies for the fall was started. EKG showed no acute changes and the patient was advised to have her AICD interrogated as an outpatient. The patient was admitted to the hospital after an unwitnessed fall. Her head CT and xrays of the hip were both negative but her chest xray showed a minimally displaced fracture of the right 5th rib. She was given a lidocaine patch and started on tylenol for pain control. As the patient does not remember the incident due to her dementia, a work up for cardiac etiologies for the fall was started. EKG showed no acute changes and the patient was advised to have her pacemaker interrogated as an outpatient. On admission, patient was also found to have pyuria on her UA however patient was asymptomatic and monitored off antibiotics. Family opted to bring patient home without PT evaluation. Patient is now medically stable for discharge. The patient was admitted to the hospital after an unwitnessed fall. Her head CT and xrays of the hip were both negative but her chest xray showed a minimally displaced fracture of the right 5th rib. She was given a lidocaine patch and started on tylenol for pain control. As the patient does not remember the incident due to her dementia, a work up for cardiac etiologies for the fall was started. EKG showed no acute changes and the patient was advised to have her pacemaker interrogated as an outpatient. On admission, patient was also found to have pyuria on her UA however patient was asymptomatic and monitored off antibiotics. Patient's pain was controlled on day of discharge and PT evaluated patient prior to discharge. Family opted to bring patient home. Patient is now medically stable for discharge. The patient was admitted to the hospital after an unwitnessed fall. Her head CT and xrays of the hip were both negative but her chest xray showed a minimally displaced fracture of the right 5th rib. She was given a lidocaine patch and started on tylenol for pain control. As the patient does not remember the incident due to her dementia, a work up for cardiac etiologies for the fall was started. EKG showed no acute changes and her pace maker was interrogated that showed no events at the time of the fall. On admission, patient was also found to have pyuria on her UA however patient was asymptomatic and monitored off antibiotics. Patient's pain was controlled on day of discharge and PT evaluated patient prior to discharge. Family opted to bring patient home. Patient is now medically stable for discharge. The patient was admitted to the hospital after an unwitnessed fall. Her head CT and xrays of the hip were both negative but her chest xray showed a minimally displaced fracture of the right 5th rib. As the patient does not remember the incident due to her dementia, a work up for cardiac etiologies for the fall was started. EKG showed no acute changes and her pacemaker was interrogated that showed no events at the time of the fall. On admission, patient was also found to have pyuria on her UA however patient was asymptomatic and monitored off antibiotics.  She was given a lidocaine patch and started on tylenol with tramadol around the clock. Patient's family requested a nerve block to be performed for the pain, so anesthesiology and the acute pain service was consulted, however, they said that nerve blocks are not routinely done inpatient (except for in ICU setting or OR) and mostly only done outpatient. Patient's pain improved with oral medications, and was able to participate with PT, and was recommended for rehab. Patient's pain was controlled on day of discharge and is now stable for discharge to rehab. The patient was admitted to the hospital after an unwitnessed fall. Her head CT and xrays of the hip were both negative but her chest xray showed a minimally displaced fracture of the right 5th rib. As the patient does not remember the incident due to her dementia, a work up for cardiac etiologies for the fall was started. EKG showed no acute changes and her pacemaker was interrogated that showed no events at the time of the fall. On admission, patient was also found to have pyuria on her UA however patient was asymptomatic and monitored off antibiotics.  She was given a lidocaine patch and started on tylenol with tramadol around the clock. Patient's family requested a nerve block to be performed for the pain, so anesthesiology and the acute pain service was consulted, however, they said that nerve blocks are not routinely done inpatient (except for in ICU setting or OR) and mostly only done outpatient. Patient's pain improved with oral medications, and was able to participate with PT, and was recommended for rehab. Patient's pain was controlled on day of discharge and is now stable for discharge to rehab at this time. Her course was c/b urinary retention for which she briefly had a hensley catheter. It was removed and patient voided freely thereafter.

## 2018-07-28 NOTE — PROGRESS NOTE ADULT - PROBLEM SELECTOR PLAN 10
- Diet: dysphagia diet, thickened liquids; aspiration precautions   - DVT PPx: Lovenox 40mg QD  - DNR/DNI

## 2018-07-28 NOTE — PHYSICAL THERAPY INITIAL EVALUATION ADULT - ADDITIONAL COMMENTS
As per patient's aide, patient lives with 24 hrs HHA in a house, w/c use for out door, walker and assistance ambulation indoor.

## 2018-07-28 NOTE — PROGRESS NOTE ADULT - ASSESSMENT
89 y/o F w/hypothyroidism, PPM, HFpEF (mild diastolic dysfunction 2016), dementia (AAOx2 at baseline), macular degeneration, hemorrhagic CVA, recurrent UTI, b/l DVT (off AC since 2014 following CVA) admitted s/p mechanical fall c/b Rib fracture found to have asymptomatic pyuria.

## 2018-07-28 NOTE — PHYSICAL THERAPY INITIAL EVALUATION ADULT - LEVEL OF INDEPENDENCE: SIT/STAND, REHAB EVAL
Attempted standing with 2 persons assistance but patient is resisting to stand. patient was left in supine in bed.

## 2018-07-28 NOTE — DISCHARGE NOTE ADULT - CARE PROVIDER_API CALL
Ermelinda Hill), Geriatric Medicine; Internal Medicine  [address] [place] [address]  Phone: [phone]  Fax: [phone]

## 2018-07-28 NOTE — PROGRESS NOTE ADULT - PROBLEM SELECTOR PLAN 2
- Hx and mechanism not consistent with syncopal/vasovagal episode, rather more in line w/ mechanical fall. Fall not preceded w/ any CP, SOB, palpitations, HA or dizziness.   - EKG w/ no ST elevations, depressions or T wave inversions- intervals wnl. PPM eval in AM to assess for any events that may have preceded her fall  - CT Head negative for any ICH.  - fall precautions, PT consult

## 2018-07-28 NOTE — PROGRESS NOTE ADULT - SUBJECTIVE AND OBJECTIVE BOX
Patient is a 89y old  Female who presents with a chief complaint of Fall (2018 21:46)      SUBJECTIVE / OVERNIGHT EVENTS:  Patient seen and examined at bedside.    Other Review of Systems Negative.    MEDICATIONS  (STANDING):  acetaminophen 300 mG/codeine 30 mG 1 Tablet(s) Oral every 4 hours  amiodarone    Tablet 100 milliGRAM(s) Oral daily  aspirin  chewable 81 milliGRAM(s) Oral daily  atorvastatin 10 milliGRAM(s) Oral at bedtime  cyanocobalamin 1000 MICROGram(s) Oral daily  docusate sodium 100 milliGRAM(s) Oral three times a day  enoxaparin Injectable 40 milliGRAM(s) SubCutaneous daily  furosemide    Tablet 20 milliGRAM(s) Oral <User Schedule>  levothyroxine 112 MICROGram(s) Oral daily  lidocaine   Patch 1 Patch Transdermal once  LORazepam     Tablet 0.25 milliGRAM(s) Oral at bedtime  QUEtiapine 25 milliGRAM(s) Oral at bedtime  senna 2 Tablet(s) Oral at bedtime  sodium chloride 0.9%. 1000 milliLiter(s) (50 mL/Hr) IV Continuous <Continuous>  spironolactone Oral Tab/Cap - Peds 12.5 milliGRAM(s) Oral <User Schedule>    MEDICATIONS  (PRN):      OBJECTIVE:    Vital Signs Last 24 Hrs  T(C): 36.5 (2018 05:38), Max: 37 (2018 15:28)  T(F): 97.7 (2018 05:38), Max: 98.6 (2018 15:28)  HR: 61 (2018 11:22) (60 - 64)  BP: 105/59 (2018 11:22) (105/50 - 163/65)  BP(mean): --  RR: 17 (2018 09:37) (16 - 18)  SpO2: 96% (2018 09:37) (96% - 100%)    2018 07:01  -  2018 07:00  --------------------------------------------------------  IN: 240 mL / OUT: 600 mL / NET: -360 mL    PHYSICAL EXAM:  GENERAL: A&O x1  HEAD:  Atraumatic, Normocephalic  EYES: EOMI, PERRLA, conjunctiva and sclera clear  NECK: Supple, No JVD  CHEST/LUNG: tenderness to palpation of ribsClear to auscultation bilaterally; No wheeze  HEART: Regular rate and rhythm; No murmurs, rubs, or gallops  ABDOMEN: Soft, Nontender, Nondistended; Bowel sounds present  EXTREMITIES:  2+ Peripheral Pulses, No clubbing, cyanosis, or edema  PSYCH: AAOx3  NEUROLOGY: non-focal  SKIN: No rashes or lesions    LABS:                        12.0   6.52  )-----------( 224      ( 2018 06:35 )             37.4     Auto Eosinophil # x     / Auto Eosinophil % x     / Auto Neutrophil # x     / Auto Neutrophil % x     / BANDS % x                            12.4   8.37  )-----------( 255      ( 2018 17:37 )             39.5     Auto Eosinophil # 0.18  / Auto Eosinophil % 2.2   / Auto Neutrophil # 5.96  / Auto Neutrophil % 71.2  / BANDS % x            141  |  105  |  24<H>  ----------------------------<  113<H>  4.0   |  21<L>  |  0.77      140  |  105  |  25<H>  ----------------------------<  101<H>  4.7   |  24  |  0.84    Ca    8.4      2018 06:35  Mg     2.2       Phos  3.8       TPro  7.4  /  Alb  3.7  /  TBili  0.5  /  DBili  x   /  AST  17  /  ALT  9   /  AlkPhos  96      Urinalysis Basic - ( 2018 19:40 )    Color: YELLOW / Appearance: HAZY / S.025 / pH: 6.0  Gluc: NEGATIVE / Ketone: NEGATIVE  / Bili: NEGATIVE / Urobili: NORMAL mg/dL   Blood: TRACE / Protein: 100 mg/dL / Nitrite: NEGATIVE   Leuk Esterase: LARGE / RBC: 2-5 / WBC >50   Sq Epi: MOD / Non Sq Epi: x / Bacteria: x    Care Discussed with Consultants/Other Providers:    RADIOLOGY & ADDITIONAL TESTS:  (Imaging Personally Reviewed) Patient is a 89y old  Female who presents with a chief complaint of Fall (2018 21:46)      SUBJECTIVE / OVERNIGHT EVENTS:  Patient seen and examined at bedside. No acute events overnight. Denies any LOC, dizziness, CP, palpitations, SOB prior to her fall. Pt was helped back up to her bed but began complaining of severe R rib pain. She described the pain as a sharp, constant non radiating pain exacerbated w/ sharp movements and deep inspiration. No reported fevers, chills, recent URI, cough, abd pain, dysuria, or diarrhea.       Other Review of Systems Negative.    MEDICATIONS  (STANDING):  acetaminophen 300 mG/codeine 30 mG 1 Tablet(s) Oral every 4 hours  amiodarone    Tablet 100 milliGRAM(s) Oral daily  aspirin  chewable 81 milliGRAM(s) Oral daily  atorvastatin 10 milliGRAM(s) Oral at bedtime  cyanocobalamin 1000 MICROGram(s) Oral daily  docusate sodium 100 milliGRAM(s) Oral three times a day  enoxaparin Injectable 40 milliGRAM(s) SubCutaneous daily  furosemide    Tablet 20 milliGRAM(s) Oral <User Schedule>  levothyroxine 112 MICROGram(s) Oral daily  lidocaine   Patch 1 Patch Transdermal once  LORazepam     Tablet 0.25 milliGRAM(s) Oral at bedtime  QUEtiapine 25 milliGRAM(s) Oral at bedtime  senna 2 Tablet(s) Oral at bedtime  sodium chloride 0.9%. 1000 milliLiter(s) (50 mL/Hr) IV Continuous <Continuous>  spironolactone Oral Tab/Cap - Peds 12.5 milliGRAM(s) Oral <User Schedule>  OBJECTIVE:    Vital Signs Last 24 Hrs  T(C): 36.5 (2018 05:38), Max: 37 (2018 15:28)  T(F): 97.7 (2018 05:38), Max: 98.6 (2018 15:28)  HR: 61 (2018 11:22) (60 - 64)  BP: 105/59 (2018 11:22) (105/50 - 163/65)  BP(mean): --  RR: 17 (2018 09:37) (16 - 18)  SpO2: 96% (2018 09:37) (96% - 100%)    2018 07: 07:00  --------------------------------------------------------  IN: 240 mL / OUT: 600 mL / NET: -360 mL    PHYSICAL EXAM:  GENERAL: A&O x1  HEENT: Normocephalic, atraumatic.  Dry mucous membranes    	Neck: Supple.  Full range of motion.   No lymphadenopathy.   	Heart: RRR.  Normal S1 and S2.  No murmurs, rubs, or gallops.   	Lungs: CTAB. No wheezes, crackles, or rhonchi.  Pain to palpation under R breast  	Abdomen: BS+, soft, NT/ND.  No organomegaly.  	Skin: Warm and dry.  No rashes.  	Extremities: No edema, clubbing, or cyanosis.  2+ peripheral pulses b/l.  	Musculoskeletal: No deformities.  No spinal or paraspinal tenderness.  Neuro: CN2-12 intact.  LABS:                        12.0   6.52  )-----------( 224      ( 2018 06:35 )             37.4     Auto Eosinophil # x     / Auto Eosinophil % x     / Auto Neutrophil # x     / Auto Neutrophil % x     / BANDS % x                            12.4   8.37  )-----------( 255      ( 2018 17:37 )             39.5     Auto Eosinophil # 0.18  / Auto Eosinophil % 2.2   / Auto Neutrophil # 5.96  / Auto Neutrophil % 71.2  / BANDS % x            141  |  105  |  24<H>  ----------------------------<  113<H>  4.0   |  21<L>  |  0.77      140  |  105  |  25<H>  ----------------------------<  101<H>  4.7   |  24  |  0.84    Ca    8.4      2018 06:35  Mg     2.2       Phos  3.8       TPro  7.4  /  Alb  3.7  /  TBili  0.5  /  DBili  x   /  AST  17  /  ALT  9   /  AlkPhos  96  -    Urinalysis Basic - ( 2018 19:40 )    Color: YELLOW / Appearance: HAZY / S.025 / pH: 6.0  Gluc: NEGATIVE / Ketone: NEGATIVE  / Bili: NEGATIVE / Urobili: NORMAL mg/dL   Blood: TRACE / Protein: 100 mg/dL / Nitrite: NEGATIVE   Leuk Esterase: LARGE / RBC: 2-5 / WBC >50   Sq Epi: MOD / Non Sq Epi: x / Bacteria: x    Care Discussed with Consultants/Other Providers:    RADIOLOGY & ADDITIONAL TESTS:  (Imaging Personally Reviewed)

## 2018-07-28 NOTE — PROGRESS NOTE ADULT - PROBLEM SELECTOR PLAN 3
Asymptomatic   - UA + LE and 50>WBC which could potentially have precipitated this event however pt afebrile, non toxic appearing, no suprapubic tenderness/dysuria, or leukocytosis at this time.   - Squamous epithelium in UA pointing towards dirty catch. Monitor off abx at this time. f/u UCx, treat if positive.

## 2018-07-28 NOTE — DISCHARGE NOTE ADULT - SECONDARY DIAGNOSIS.
Heart failure with preserved ejection fraction Urinary tract infection without hematuria, site unspecified Urinary retention

## 2018-07-28 NOTE — DISCHARGE NOTE ADULT - PATIENT PORTAL LINK FT
You can access the [facility] Patient Portal, offered by [facility], by registering with the following website: [email or web address]

## 2018-07-28 NOTE — DISCHARGE NOTE ADULT - MEDICATION SUMMARY - MEDICATIONS TO TAKE
I will START or STAY ON the medications listed below when I get home from the hospital:    spironolactone 25 mg oral tablet  -- 0.5 tab(s) by mouth three times a week (Monday, Thursday, and Sunday)   -- Indication: For Heart failure with preserved ejection fraction    acetaminophen 325 mg oral tablet  -- 2 tab(s) by mouth every 6 hours, As needed, Mild Pain (1 - 3)  -- Indication: For Pain Control     aspirin 81 mg oral tablet, chewable  -- 1 tab(s) by mouth once a day  -- Indication: For CVA (cerebral vascular accident)    amiodarone 100 mg oral tablet  -- 1 tab(s) by mouth once a day  -- Indication: For Heart failure with preserved ejection fraction    LORazepam 0.5 mg oral tablet  -- 0.5 tab(s) by mouth once a day (at bedtime), As Needed  -- Indication: For Dementia without behavioral disturbance, unspecified dementia type    atorvastatin 10 mg oral tablet  -- 1 tab(s) by mouth once a day  -- Indication: For CVA (cerebral vascular accident)    SEROquel 25 mg oral tablet  -- 1 tab(s) by mouth once a day (may repeat an additional tablet PRN)   -- Indication: For Dementia without behavioral disturbance, unspecified dementia type    BuSpar 5 mg oral tablet  -- 1 tab(s) by mouth 2 times a day  -- Indication: For Dementia without behavioral disturbance, unspecified dementia type    lidocaine 5% topical film  --  on skin   -- Indication: For Pain    Proctosol-HC 2.5% rectal cream with applicator  -- 1 application rectally once a week, As Needed  -- Indication: For Hemorrhoid    furosemide 20 mg oral tablet  -- 1 tab(s) by mouth 3 times a week on Monday, Thursday, and Sunday   -- Indication: For Heart failure with preserved ejection fraction    Linzess 145 mcg oral capsule  -- 1 cap(s) by mouth once a day  -- Indication: For Constipation    Cranberry oral capsule  -- 1  by mouth once a day  -- Indication: For Urinary tract infection without hematuria, site unspecified    polyethylene glycol 3350 oral powder for reconstitution  -- 17 gram(s) by mouth once a day, As Needed  -- Indication: For Constipation    senna oral tablet  -- 2 tab(s) by mouth once a day (at bedtime)  -- Indication: For Constipation    docusate sodium 100 mg oral capsule  -- 1 cap(s) by mouth 3 times a day  -- Indication: For Constipation    levothyroxine 112 mcg (0.112 mg) oral tablet  -- 1 tab(s) by mouth once a day  -- Indication: For Hypothyroidism    methenamine hippurate 1 g oral tablet  -- 0.5 tab(s) by mouth once a day  -- Indication: For Urinary tract infection without hematuria, site unspecified    nitrofurantoin macrocrystals-monohydrate 100 mg oral capsule  -- 1 cap(s) by mouth 2 times a day (with meals)  -- Indication: For Urinary tract infection without hematuria, site unspecified    B Complex 50 oral tablet, extended release  -- 1 tab(s) by mouth once a day  -- Indication: For Need for prophylactic measure    PreserVision AREDS 2 oral capsule  -- 1 cap(s) by mouth 2 times a day  -- Indication: For Need for prophylactic measure    Multiple Vitamins oral tablet  -- 1 tab(s) by mouth once a day  -- Indication: For Need for prophylactic measure    cyanocobalamin 1000 mcg oral tablet  -- 1 tab(s) by mouth once a day  -- Indication: For Need for prophylactic measure I will START or STAY ON the medications listed below when I get home from the hospital:    spironolactone 25 mg oral tablet  -- 0.5 tab(s) by mouth three times a week (Monday, Thursday, and Sunday)   -- Indication: For Heart failure with preserved ejection fraction    aspirin 81 mg oral tablet, chewable  -- 1 tab(s) by mouth once a day  -- Indication: For CVA (cerebral vascular accident)    acetaminophen 325 mg oral tablet  -- 2 tab(s) by mouth every 6 hours  -- Indication: For Rib fracture    traMADol 50 mg oral tablet  -- 0.5 tab(s) by mouth every 6 hours  -- Indication: For Rib fracture    amiodarone 100 mg oral tablet  -- 1 tab(s) by mouth once a day  -- Indication: For Heart failure with preserved ejection fraction    LORazepam 0.5 mg oral tablet  -- 0.5 tab(s) by mouth once a day (at bedtime), As Needed  -- Indication: For Dementia without behavioral disturbance, unspecified dementia type    atorvastatin 10 mg oral tablet  -- 1 tab(s) by mouth once a day  -- Indication: For CVA (cerebral vascular accident)    SEROquel 25 mg oral tablet  -- 1 tab(s) by mouth once a day (may repeat an additional tablet PRN)   -- Indication: For Dementia without behavioral disturbance, unspecified dementia type    busPIRone 5 mg oral tablet  -- 1 tab(s) by mouth 2 times a day  -- Indication: For Dementia without behavioral disturbance, unspecified dementia type    lidocaine 5% topical film  --  on skin   12 hours on and 12 hours off    -- Indication: For Rib fracture    Proctosol-HC 2.5% rectal cream with applicator  -- 1 application rectally once a week, As Needed  -- Indication: For Hemorrhoid    furosemide 20 mg oral tablet  -- 1 tab(s) by mouth 3 times a week on Monday, Thursday, and Sunday   -- Indication: For Heart failure with preserved ejection fraction    Linzess 145 mcg oral capsule  -- 1 cap(s) by mouth once a day  -- Indication: For Constipation    Cranberry oral capsule  -- 1  by mouth once a day  -- Indication: For Urinary tract infection without hematuria, site unspecified    polyethylene glycol 3350 oral powder for reconstitution  -- 17 gram(s) by mouth once a day, As Needed  -- Indication: For Constipation    docusate sodium 100 mg oral capsule  -- 1 cap(s) by mouth 3 times a day  -- Indication: For Constipation    senna oral tablet  -- 2 tab(s) by mouth once a day (at bedtime)  -- Indication: For Constipation    levothyroxine 112 mcg (0.112 mg) oral tablet  -- 1 tab(s) by mouth once a day  -- Indication: For Hypothyroidism    methenamine hippurate 1 g oral tablet  -- 0.5 tab(s) by mouth 2 times a day  -- Indication: For UTI prophylaxis     PreserVision AREDS 2 oral capsule  -- 1 cap(s) by mouth 2 times a day  -- Indication: For Need for prophylactic measure    Multiple Vitamins oral tablet  -- 1 tab(s) by mouth once a day  -- Indication: For Need for prophylactic measure    B Complex 50 oral tablet, extended release  -- 1 tab(s) by mouth once a day  -- Indication: For Need for prophylactic measure    cyanocobalamin 1000 mcg oral tablet  -- 1 tab(s) by mouth once a day  -- Indication: For Need for prophylactic measure

## 2018-07-28 NOTE — PROGRESS NOTE ADULT - PROBLEM SELECTOR PLAN 4
- Likely component of dehydration as well that could have contributed to fall as pt w/ dry mucous membranes, Bun: Cr>20, with recent decreased PO intake.  - Encourage PO hydration. NS @50cc/h stop after 10h.  - Nutrition consult  - per HHA, at bedside, patient on thickened liquids at home and small chopped regular diet; aspiration precautions, dysphagia diet with thickened liquids

## 2018-07-28 NOTE — DISCHARGE NOTE ADULT - PLAN OF CARE
being treated You were admitted after a fall and were found to have a fracture of the right 5th rib. You were started on pain medication to help minimize discomfort and instructed on how to use incentive spirometry to exercise your lungs. If the pain is not adequately controlled or you become increasingly short of breath, please seek medical attention. Please follow up with you primary care doctor within 1-2 weeks of discharge. monitored When you were admitted you were evaluated for signs of an acute exacerbation of your heart failure. We did an EKG and spoke about interrogating your AICD as an outpatient. We continued your home medications for your heart failure. Please follow up with your cardiologist within 1-2 weeks of discharge to evaluate your AICD. You were continued on your home prophylactic antibiotics for your recurrent UTIs. If you exhibit signs of urinary tract infection you should seek medical attention. Please follow up with your primary care doctor within 1-2 weeks of discharge. When you were admitted you were evaluated for signs of an acute exacerbation of your heart failure. We did an EKG and spoke about interrogating your pacemaker as an outpatient. We continued your home medications for your heart failure. Please follow up with your cardiologist within 1-2 weeks of discharge to evaluate your pacemaker When you were admitted you were evaluated for signs of an acute exacerbation of your heart failure. We did an EKG and interrogated your pacemaker which showed no events to suggest a cardiac reason for the fall. We continued your home medications for your heart failure. Please follow up with your cardiologist within 1-2 weeks of discharge to evaluate your pacemaker You were continued on your home prophylactic antibiotics for your recurrent UTIs. You exhibited signs of urinary retention likely due to decreased mobility and activity. If you exhibit signs of urinary tract infection you should seek medical attention. Please follow up with your primary care doctor within 1-2 weeks of discharge. You were admitted after a fall and were found to have a fracture of the right 5th rib. You were started on pain medication to help minimize discomfort and instructed on how to use incentive spirometry to exercise your lungs. If the pain is not adequately controlled or you become increasingly short of breath, please seek medical attention. Please continue with tylenol and tramadol for pain control. Please follow up with you primary care doctor within 1-2 weeks of discharge. You were continued on your home prophylactic antibiotics for your recurrent UTIs. You exhibited signs of urinary retention likely due to decreased mobility and activity. You had a hensley placed in the hospital which was self-removed and you were able to void normally afterward. If you exhibit signs of urinary tract infection you should seek medical attention. Please follow up with your primary care doctor within 1-2 weeks of discharge. -You required a hensley because you experienced urinary retention. You briefly had a hensley that was removed after which you urinated on your own on multiple occasions. Please ensure you are voiding regularly

## 2018-07-29 LAB
BACTERIA UR CULT: SIGNIFICANT CHANGE UP
BUN SERPL-MCNC: 20 MG/DL — SIGNIFICANT CHANGE UP (ref 7–23)
CALCIUM SERPL-MCNC: 8.1 MG/DL — LOW (ref 8.4–10.5)
CHLORIDE SERPL-SCNC: 107 MMOL/L — SIGNIFICANT CHANGE UP (ref 98–107)
CO2 SERPL-SCNC: 23 MMOL/L — SIGNIFICANT CHANGE UP (ref 22–31)
CREAT SERPL-MCNC: 0.66 MG/DL — SIGNIFICANT CHANGE UP (ref 0.5–1.3)
GLUCOSE SERPL-MCNC: 90 MG/DL — SIGNIFICANT CHANGE UP (ref 70–99)
HCT VFR BLD CALC: 34.2 % — LOW (ref 34.5–45)
HGB BLD-MCNC: 10.7 G/DL — LOW (ref 11.5–15.5)
MAGNESIUM SERPL-MCNC: 2.1 MG/DL — SIGNIFICANT CHANGE UP (ref 1.6–2.6)
MCHC RBC-ENTMCNC: 31.3 % — LOW (ref 32–36)
MCHC RBC-ENTMCNC: 32.3 PG — SIGNIFICANT CHANGE UP (ref 27–34)
MCV RBC AUTO: 103.3 FL — HIGH (ref 80–100)
NRBC # FLD: 0 — SIGNIFICANT CHANGE UP
PHOSPHATE SERPL-MCNC: 3 MG/DL — SIGNIFICANT CHANGE UP (ref 2.5–4.5)
PLATELET # BLD AUTO: 210 K/UL — SIGNIFICANT CHANGE UP (ref 150–400)
PMV BLD: 11.5 FL — SIGNIFICANT CHANGE UP (ref 7–13)
POTASSIUM SERPL-MCNC: 4.2 MMOL/L — SIGNIFICANT CHANGE UP (ref 3.5–5.3)
POTASSIUM SERPL-SCNC: 4.2 MMOL/L — SIGNIFICANT CHANGE UP (ref 3.5–5.3)
RBC # BLD: 3.31 M/UL — LOW (ref 3.8–5.2)
RBC # FLD: 13.9 % — SIGNIFICANT CHANGE UP (ref 10.3–14.5)
SODIUM SERPL-SCNC: 141 MMOL/L — SIGNIFICANT CHANGE UP (ref 135–145)
SPECIMEN SOURCE: SIGNIFICANT CHANGE UP
WBC # BLD: 6.82 K/UL — SIGNIFICANT CHANGE UP (ref 3.8–10.5)
WBC # FLD AUTO: 6.82 K/UL — SIGNIFICANT CHANGE UP (ref 3.8–10.5)

## 2018-07-29 PROCEDURE — 71045 X-RAY EXAM CHEST 1 VIEW: CPT | Mod: 26

## 2018-07-29 PROCEDURE — 99233 SBSQ HOSP IP/OBS HIGH 50: CPT | Mod: GC

## 2018-07-29 RX ORDER — METHENAMINE MANDELATE 1 G
0.5 TABLET ORAL
Qty: 0 | Refills: 0 | Status: DISCONTINUED | OUTPATIENT
Start: 2018-07-29 | End: 2018-08-02

## 2018-07-29 RX ORDER — LINACLOTIDE 145 UG/1
145 CAPSULE, GELATIN COATED ORAL DAILY
Qty: 0 | Refills: 0 | Status: DISCONTINUED | OUTPATIENT
Start: 2018-07-29 | End: 2018-08-02

## 2018-07-29 RX ORDER — ACETAMINOPHEN 500 MG
975 TABLET ORAL EVERY 6 HOURS
Qty: 0 | Refills: 0 | Status: DISCONTINUED | OUTPATIENT
Start: 2018-07-29 | End: 2018-07-30

## 2018-07-29 RX ADMIN — Medication 0.25 MILLIGRAM(S): at 06:05

## 2018-07-29 RX ADMIN — AMIODARONE HYDROCHLORIDE 100 MILLIGRAM(S): 400 TABLET ORAL at 06:06

## 2018-07-29 RX ADMIN — Medication 975 MILLIGRAM(S): at 23:40

## 2018-07-29 RX ADMIN — Medication 100 MILLIGRAM(S): at 22:28

## 2018-07-29 RX ADMIN — OXYCODONE AND ACETAMINOPHEN 0.5 TABLET(S): 5; 325 TABLET ORAL at 01:21

## 2018-07-29 RX ADMIN — QUETIAPINE FUMARATE 25 MILLIGRAM(S): 200 TABLET, FILM COATED ORAL at 19:13

## 2018-07-29 RX ADMIN — Medication 100 MILLIGRAM(S): at 06:05

## 2018-07-29 RX ADMIN — SENNA PLUS 2 TABLET(S): 8.6 TABLET ORAL at 22:28

## 2018-07-29 RX ADMIN — Medication 100 MILLIGRAM(S): at 08:08

## 2018-07-29 RX ADMIN — PREGABALIN 1000 MICROGRAM(S): 225 CAPSULE ORAL at 11:30

## 2018-07-29 RX ADMIN — Medication 1 TABLET(S): at 11:30

## 2018-07-29 RX ADMIN — Medication 5 MILLIGRAM(S): at 17:15

## 2018-07-29 RX ADMIN — SPIRONOLACTONE 12.5 MILLIGRAM(S): 25 TABLET, FILM COATED ORAL at 06:06

## 2018-07-29 RX ADMIN — Medication 112 MICROGRAM(S): at 06:05

## 2018-07-29 RX ADMIN — LINACLOTIDE 145 MICROGRAM(S): 145 CAPSULE, GELATIN COATED ORAL at 17:16

## 2018-07-29 RX ADMIN — OXYCODONE AND ACETAMINOPHEN 0.5 TABLET(S): 5; 325 TABLET ORAL at 00:21

## 2018-07-29 RX ADMIN — Medication 1000 MILLIGRAM(S): at 14:12

## 2018-07-29 RX ADMIN — Medication 400 MILLIGRAM(S): at 13:57

## 2018-07-29 RX ADMIN — Medication 0.5 GRAM(S): at 17:16

## 2018-07-29 RX ADMIN — ENOXAPARIN SODIUM 40 MILLIGRAM(S): 100 INJECTION SUBCUTANEOUS at 11:30

## 2018-07-29 RX ADMIN — Medication 5 MILLIGRAM(S): at 06:05

## 2018-07-29 RX ADMIN — Medication 81 MILLIGRAM(S): at 11:30

## 2018-07-29 RX ADMIN — OXYCODONE AND ACETAMINOPHEN 0.5 TABLET(S): 5; 325 TABLET ORAL at 08:08

## 2018-07-29 RX ADMIN — Medication 20 MILLIGRAM(S): at 06:05

## 2018-07-29 RX ADMIN — Medication 400 UNIT(S): at 11:30

## 2018-07-29 RX ADMIN — POLYETHYLENE GLYCOL 3350 17 GRAM(S): 17 POWDER, FOR SOLUTION ORAL at 11:30

## 2018-07-29 RX ADMIN — Medication 100 MILLIGRAM(S): at 11:30

## 2018-07-29 RX ADMIN — OXYCODONE AND ACETAMINOPHEN 0.5 TABLET(S): 5; 325 TABLET ORAL at 09:08

## 2018-07-29 RX ADMIN — ATORVASTATIN CALCIUM 10 MILLIGRAM(S): 80 TABLET, FILM COATED ORAL at 22:28

## 2018-07-29 NOTE — DIETITIAN INITIAL EVALUATION ADULT. - OTHER INFO
Nutrition consult received on 7/27/18 for assessment . Pt. lethargic , sleepy , family @ bedside , relates she is made drowsy w/ med, was a good eater , has private aide .  pt. was taking Ensure @ home

## 2018-07-29 NOTE — PROGRESS NOTE ADULT - SUBJECTIVE AND OBJECTIVE BOX
***************************************************************  Dr. Arthur Gloria, PGY3  Internal Medicine   [facility] Pager: [phone]  [facility] Pager: [phone]  ***************************************************************    KATHIA MITCHELL  89y  MRN: [id number]    Subjective:    Patient is a 89y old  Female who presents with a chief complaint of Fall (2018 18:54)      HPI: Patient able to control pain only, when laying flat. Has pain when sitting up or doing activity. No complaints of dyspnea.       MEDICATIONS  (STANDING):  acetaminophen  IVPB. 1000 milliGRAM(s) IV Intermittent once  amiodarone    Tablet 100 milliGRAM(s) Oral daily  aspirin  chewable 81 milliGRAM(s) Oral daily  atorvastatin 10 milliGRAM(s) Oral at bedtime  busPIRone 5 milliGRAM(s) Oral two times a day  cholecalciferol 400 Unit(s) Oral daily  cyanocobalamin 1000 MICROGram(s) Oral daily  docusate sodium 100 milliGRAM(s) Oral three times a day  enoxaparin Injectable 40 milliGRAM(s) SubCutaneous daily  furosemide    Tablet 20 milliGRAM(s) Oral <User Schedule>  levothyroxine 112 MICROGram(s) Oral daily  lidocaine   Patch 1 Patch Transdermal once  LORazepam     Tablet 0.25 milliGRAM(s) Oral <User Schedule>  multivitamin 1 Tablet(s) Oral daily  nitrofurantoin (MACROBID) 100 milliGRAM(s) Oral two times a day with meals  polyethylene glycol 3350 17 Gram(s) Oral daily  QUEtiapine 25 milliGRAM(s) Oral at bedtime  senna 2 Tablet(s) Oral at bedtime  sodium chloride 0.9%. 1000 milliLiter(s) (50 mL/Hr) IV Continuous <Continuous>  sodium chloride 0.9%. 1000 milliLiter(s) (50 mL/Hr) IV Continuous <Continuous>  spironolactone 12.5 milliGRAM(s) Oral <User Schedule>    MEDICATIONS  (PRN):  acetaminophen   Tablet. 975 milliGRAM(s) Oral every 6 hours PRN Severe Pain (7 - 10)        Objective:    Vitals: Vital Signs Last 24 Hrs  T(C): 37.1 (18 @ 06:04), Max: 37.1 (18 @ 06:04)  T(F): 98.7 (18 @ 06:04), Max: 98.7 (18 @ 06:04)  HR: 61 (18 @ 06:04) (60 - 61)  BP: 109/59 (18 @ 06:04) (105/59 - 133/54)  BP(mean): --  RR: 18 (18 @ 06:04) (17 - 18)  SpO2: 95% (18 @ 06:04) (95% - 96%)            I&O's Summary    2018 07:  -  2018 07:00  --------------------------------------------------------  IN: 0 mL / OUT: 250 mL / NET: -250 mL    2018 07:  -  2018 11:08  --------------------------------------------------------  IN: 0 mL / OUT: 500 mL / NET: -500 mL        PHYSICAL EXAM:  GENERAL: NAD  EYES: EOMI, PERRLA, conjunctiva and sclera clear  CHEST/LUNG: Clear to percussion bilaterally; Right sided tenderness to palpation.   HEART: Regular rate and rhythm; No murmurs, rubs, or gallops  ABDOMEN: Soft, Nontender, Nondistended; Bowel sounds present  SKIN: No rashes or lesions  NERVOUS SYSTEM:  Alert & Oriented X3,                               LABS:      141  |  107  |  20  ----------------------------<  90  4.2   |  23  |  0.66      141  |  105  |  24<H>  ----------------------------<  113<H>  4.0   |  21<L>  |  0.77      140  |  105  |  25<H>  ----------------------------<  101<H>  4.7   |  24  |  0.84    Ca    8.1<L>      2018 04:40  Ca    8.4      2018 06:35  Ca    9.0      2018 17:37  Phos  3.0       Mg     2.1         TPro  7.4  /  Alb  3.7  /  TBili  0.5  /  DBili  x   /  AST  17  /  ALT  9   /  AlkPhos  96                      Urinalysis Basic - ( 2018 19:40 )    Color: YELLOW / Appearance: HAZY / S.025 / pH: 6.0  Gluc: NEGATIVE / Ketone: NEGATIVE  / Bili: NEGATIVE / Urobili: NORMAL mg/dL   Blood: TRACE / Protein: 100 mg/dL / Nitrite: NEGATIVE   Leuk Esterase: LARGE / RBC: 2-5 / WBC >50   Sq Epi: MOD / Non Sq Epi: x / Bacteria: x                              10.7   6.82  )-----------( 210      ( 2018 04:40 )             34.2                         12.0   6.52  )-----------( 224      ( 2018 06:35 )             37.4                         12.4   8.37  )-----------( 255      ( 2018 17:37 )             39.5     CAPILLARY BLOOD GLUCOSE          RADIOLOGY & ADDITIONAL TESTS:    Imaging Personally Reviewed:  [ ] YES  [ ] NO      Consultants involved in case:   Consultant(s) Notes Reviewed:  [ ] YES  [ ] NO:   Care Discussed with Consultants/Other Providers [ ] YES  [ ] NO

## 2018-07-30 LAB
APPEARANCE UR: CLEAR — SIGNIFICANT CHANGE UP
BACTERIA # UR AUTO: SIGNIFICANT CHANGE UP
BILIRUB UR-MCNC: NEGATIVE — SIGNIFICANT CHANGE UP
BLOOD UR QL VISUAL: HIGH
BUN SERPL-MCNC: 26 MG/DL — HIGH (ref 7–23)
CALCIUM SERPL-MCNC: 8.5 MG/DL — SIGNIFICANT CHANGE UP (ref 8.4–10.5)
CHLORIDE SERPL-SCNC: 101 MMOL/L — SIGNIFICANT CHANGE UP (ref 98–107)
CO2 SERPL-SCNC: 23 MMOL/L — SIGNIFICANT CHANGE UP (ref 22–31)
COLOR SPEC: YELLOW — SIGNIFICANT CHANGE UP
CREAT SERPL-MCNC: 0.87 MG/DL — SIGNIFICANT CHANGE UP (ref 0.5–1.3)
GLUCOSE SERPL-MCNC: 112 MG/DL — HIGH (ref 70–99)
GLUCOSE UR-MCNC: NEGATIVE — SIGNIFICANT CHANGE UP
HCT VFR BLD CALC: 35.8 % — SIGNIFICANT CHANGE UP (ref 34.5–45)
HGB BLD-MCNC: 11.4 G/DL — LOW (ref 11.5–15.5)
HYALINE CASTS # UR AUTO: SIGNIFICANT CHANGE UP (ref 0–?)
KETONES UR-MCNC: NEGATIVE — SIGNIFICANT CHANGE UP
LEUKOCYTE ESTERASE UR-ACNC: NEGATIVE — SIGNIFICANT CHANGE UP
MAGNESIUM SERPL-MCNC: 2.1 MG/DL — SIGNIFICANT CHANGE UP (ref 1.6–2.6)
MCHC RBC-ENTMCNC: 31.8 % — LOW (ref 32–36)
MCHC RBC-ENTMCNC: 32.4 PG — SIGNIFICANT CHANGE UP (ref 27–34)
MCV RBC AUTO: 101.7 FL — HIGH (ref 80–100)
MUCOUS THREADS # UR AUTO: SIGNIFICANT CHANGE UP
NITRITE UR-MCNC: NEGATIVE — SIGNIFICANT CHANGE UP
NRBC # FLD: 0 — SIGNIFICANT CHANGE UP
PH UR: 5 — SIGNIFICANT CHANGE UP (ref 4.6–8)
PHOSPHATE SERPL-MCNC: 3.8 MG/DL — SIGNIFICANT CHANGE UP (ref 2.5–4.5)
PLATELET # BLD AUTO: 215 K/UL — SIGNIFICANT CHANGE UP (ref 150–400)
PMV BLD: 11.1 FL — SIGNIFICANT CHANGE UP (ref 7–13)
POTASSIUM SERPL-MCNC: 4 MMOL/L — SIGNIFICANT CHANGE UP (ref 3.5–5.3)
POTASSIUM SERPL-SCNC: 4 MMOL/L — SIGNIFICANT CHANGE UP (ref 3.5–5.3)
PROT UR-MCNC: NEGATIVE MG/DL — SIGNIFICANT CHANGE UP
RBC # BLD: 3.52 M/UL — LOW (ref 3.8–5.2)
RBC # FLD: 13.8 % — SIGNIFICANT CHANGE UP (ref 10.3–14.5)
RBC CASTS # UR COMP ASSIST: SIGNIFICANT CHANGE UP (ref 0–?)
SODIUM SERPL-SCNC: 139 MMOL/L — SIGNIFICANT CHANGE UP (ref 135–145)
SP GR SPEC: 1.01 — SIGNIFICANT CHANGE UP (ref 1–1.04)
SQUAMOUS # UR AUTO: SIGNIFICANT CHANGE UP
UROBILINOGEN FLD QL: NORMAL MG/DL — SIGNIFICANT CHANGE UP
WBC # BLD: 11.63 K/UL — HIGH (ref 3.8–10.5)
WBC # FLD AUTO: 11.63 K/UL — HIGH (ref 3.8–10.5)
WBC UR QL: SIGNIFICANT CHANGE UP (ref 0–?)

## 2018-07-30 PROCEDURE — 93280 PM DEVICE PROGR EVAL DUAL: CPT | Mod: 26

## 2018-07-30 PROCEDURE — 99233 SBSQ HOSP IP/OBS HIGH 50: CPT | Mod: GC

## 2018-07-30 RX ORDER — TRAMADOL HYDROCHLORIDE 50 MG/1
25 TABLET ORAL EVERY 6 HOURS
Qty: 0 | Refills: 0 | Status: DISCONTINUED | OUTPATIENT
Start: 2018-07-30 | End: 2018-07-31

## 2018-07-30 RX ORDER — LIDOCAINE 4 G/100G
1 CREAM TOPICAL DAILY
Qty: 0 | Refills: 0 | Status: DISCONTINUED | OUTPATIENT
Start: 2018-07-30 | End: 2018-08-02

## 2018-07-30 RX ORDER — TRAMADOL HYDROCHLORIDE 50 MG/1
25 TABLET ORAL EVERY 6 HOURS
Qty: 0 | Refills: 0 | Status: DISCONTINUED | OUTPATIENT
Start: 2018-07-30 | End: 2018-07-30

## 2018-07-30 RX ORDER — ACETAMINOPHEN 500 MG
650 TABLET ORAL EVERY 6 HOURS
Qty: 0 | Refills: 0 | Status: DISCONTINUED | OUTPATIENT
Start: 2018-07-30 | End: 2018-07-31

## 2018-07-30 RX ADMIN — QUETIAPINE FUMARATE 25 MILLIGRAM(S): 200 TABLET, FILM COATED ORAL at 18:25

## 2018-07-30 RX ADMIN — Medication 0.5 GRAM(S): at 06:15

## 2018-07-30 RX ADMIN — Medication 5 MILLIGRAM(S): at 17:57

## 2018-07-30 RX ADMIN — TRAMADOL HYDROCHLORIDE 25 MILLIGRAM(S): 50 TABLET ORAL at 17:57

## 2018-07-30 RX ADMIN — TRAMADOL HYDROCHLORIDE 25 MILLIGRAM(S): 50 TABLET ORAL at 18:57

## 2018-07-30 RX ADMIN — Medication 0.5 GRAM(S): at 17:57

## 2018-07-30 RX ADMIN — Medication 5 MILLIGRAM(S): at 06:14

## 2018-07-30 RX ADMIN — AMIODARONE HYDROCHLORIDE 100 MILLIGRAM(S): 400 TABLET ORAL at 06:14

## 2018-07-30 RX ADMIN — Medication 650 MILLIGRAM(S): at 13:45

## 2018-07-30 RX ADMIN — LIDOCAINE 1 PATCH: 4 CREAM TOPICAL at 23:51

## 2018-07-30 RX ADMIN — Medication 650 MILLIGRAM(S): at 23:47

## 2018-07-30 RX ADMIN — Medication 0.25 MILLIGRAM(S): at 06:21

## 2018-07-30 RX ADMIN — Medication 1 TABLET(S): at 12:45

## 2018-07-30 RX ADMIN — SPIRONOLACTONE 12.5 MILLIGRAM(S): 25 TABLET, FILM COATED ORAL at 06:14

## 2018-07-30 RX ADMIN — LIDOCAINE 1 PATCH: 4 CREAM TOPICAL at 12:44

## 2018-07-30 RX ADMIN — Medication 400 UNIT(S): at 12:44

## 2018-07-30 RX ADMIN — Medication 81 MILLIGRAM(S): at 12:44

## 2018-07-30 RX ADMIN — LINACLOTIDE 145 MICROGRAM(S): 145 CAPSULE, GELATIN COATED ORAL at 12:45

## 2018-07-30 RX ADMIN — Medication 975 MILLIGRAM(S): at 00:40

## 2018-07-30 RX ADMIN — POLYETHYLENE GLYCOL 3350 17 GRAM(S): 17 POWDER, FOR SOLUTION ORAL at 12:45

## 2018-07-30 RX ADMIN — TRAMADOL HYDROCHLORIDE 25 MILLIGRAM(S): 50 TABLET ORAL at 23:47

## 2018-07-30 RX ADMIN — Medication 20 MILLIGRAM(S): at 06:14

## 2018-07-30 RX ADMIN — ENOXAPARIN SODIUM 40 MILLIGRAM(S): 100 INJECTION SUBCUTANEOUS at 12:45

## 2018-07-30 RX ADMIN — Medication 650 MILLIGRAM(S): at 12:44

## 2018-07-30 RX ADMIN — Medication 100 MILLIGRAM(S): at 06:15

## 2018-07-30 RX ADMIN — Medication 100 MILLIGRAM(S): at 12:45

## 2018-07-30 RX ADMIN — Medication 112 MICROGRAM(S): at 06:14

## 2018-07-30 RX ADMIN — Medication 650 MILLIGRAM(S): at 17:57

## 2018-07-30 RX ADMIN — Medication 650 MILLIGRAM(S): at 18:57

## 2018-07-30 RX ADMIN — PREGABALIN 1000 MICROGRAM(S): 225 CAPSULE ORAL at 12:44

## 2018-07-30 NOTE — PROGRESS NOTE ADULT - PROBLEM SELECTOR PLAN 3
Asymptomatic   - UA + LE and 50>WBC which could potentially have precipitated this event however pt afebrile, non toxic appearing, no suprapubic tenderness/dysuria, or leukocytosis at this time.   - Squamous epithelium in UA pointing towards dirty catch. Monitor off abx at this time. UCx negative  -f/u repeat UA Asymptomatic   - UA + LE and 50>WBC which could potentially have precipitated this event however pt afebrile, non toxic appearing, no suprapubic tenderness/dysuria, or leukocytosis at this time.   - Squamous epithelium in UA pointing towards dirty catch. Monitor off abx at this time. UCx negative  -consult urology for urinary retention

## 2018-07-30 NOTE — CHART NOTE - NSCHARTNOTEFT_GEN_A_CORE
ELECTROPHYSIOLOGY    Device Interrogation Performed                                  Date/Time:    07/30/2018    @4:45pm  :       [facility]                                 Model:        [employer] CRAIN dual chamber PPM                        Mode:             DDD                                          Rate:     60/120     Atrial Lead:  P wave amplitude:          2.8                mv            Impedance                    390                   Ohms  Threshold                    0.5                      V @      0.5         ms      Ventricular Lead: RV  R wave amplitude       3.9                    mv  Impedance             410                          Ohms  Threshold              0.75                           V @           0.5      ms    APaced 79%  VPaced 3.1%  Mode Switch 0%    Battery Status:                     Good                 Underlying Rhythm:   Normal sinus rhythm 63 bpm.  Occasional PVC's    Events/Observation: Multiple episodes of PMT->PVARP adjusted.                                Episodes  of NSVT on 7/23 (2 seconds), 7/8/2018 (24 seconds). High ventricular rate episode on 5/20/18 lasting 9 mins 27 seconds.                                  No events on the day of the fall 7/27/2018. Patient on Amiodarone 100mg daily for tachybrady syndrome.     Impression/Plan:  Normal PPM  Normal sensing and pacing via iterative testing. Good battery status. Excellent threshold capture.  No reprogramming.

## 2018-07-30 NOTE — PROGRESS NOTE ADULT - PROBLEM SELECTOR PLAN 1
- Minimally displaced right lateral 5th rib fracture 2/2 mechanical fall  - Limited ability to accomplish ADLs due to pain   - Pain control w/ Lidocaine patch and Tylenol 650mg q6h.   - hold off on oxycodone due to urinary retention (s/p 2 straight caths)  - f/u UA  - CXR w/ no evidence of pneumothorax.  - Incentive spirometry  - Can Anesthesia Acute pain for nerve block, will see patient when possible most likely tomorrow.

## 2018-07-30 NOTE — PROGRESS NOTE ADULT - SUBJECTIVE AND OBJECTIVE BOX
Patient is a 89y old  Female who presents with a chief complaint of Fall (28 Jul 2018 18:54)      SUBJECTIVE / OVERNIGHT EVENTS:  Patient seen and examined at bedside. She was agitated overnight. She was very lethargic in the morning. she mentioned that it was very painful when she moved and the nurse mentioned that when she is being moved she screams. Sitting still she says she is ok.   No chest pain, no shortness of breath.     Other Review of Systems Negative.    MEDICATIONS  (STANDING):  acetaminophen   Tablet. 650 milliGRAM(s) Oral every 6 hours  amiodarone    Tablet 100 milliGRAM(s) Oral daily  aspirin  chewable 81 milliGRAM(s) Oral daily  atorvastatin 10 milliGRAM(s) Oral at bedtime  busPIRone 5 milliGRAM(s) Oral two times a day  cholecalciferol 400 Unit(s) Oral daily  cyanocobalamin 1000 MICROGram(s) Oral daily  docusate sodium 100 milliGRAM(s) Oral three times a day  enoxaparin Injectable 40 milliGRAM(s) SubCutaneous daily  furosemide    Tablet 20 milliGRAM(s) Oral <User Schedule>  levothyroxine 112 MICROGram(s) Oral daily  lidocaine   Patch 1 Patch Transdermal daily  linaclotide 145 MICROGram(s) Oral daily  LORazepam     Tablet 0.25 milliGRAM(s) Oral <User Schedule>  methenamine hippurate 0.5 Gram(s) Oral two times a day  multivitamin 1 Tablet(s) Oral daily  polyethylene glycol 3350 17 Gram(s) Oral daily  Preservision 1 Capsule(s) 1 Capsule(s) Oral daily  QUEtiapine 25 milliGRAM(s) Oral at bedtime  senna 2 Tablet(s) Oral at bedtime  sodium chloride 0.9%. 1000 milliLiter(s) (50 mL/Hr) IV Continuous <Continuous>  spironolactone 12.5 milliGRAM(s) Oral <User Schedule>    MEDICATIONS  (PRN):  traMADol 25 milliGRAM(s) Oral every 6 hours PRN Severe Pain (7 - 10)      OBJECTIVE:    Vital Signs Last 24 Hrs  T(C): 36.7 (30 Jul 2018 06:09), Max: 36.8 (29 Jul 2018 21:25)  T(F): 98 (30 Jul 2018 06:09), Max: 98.2 (29 Jul 2018 21:25)  HR: 61 (30 Jul 2018 06:09) (61 - 80)  BP: 119/61 (30 Jul 2018 06:09) (119/61 - 132/73)  BP(mean): --  RR: 18 (30 Jul 2018 06:09) (17 - 18)  SpO2: 95% (30 Jul 2018 06:09) (95% - 96%)    CAPILLARY BLOOD GLUCOSE        I&O's Summary    29 Jul 2018 07:01  -  30 Jul 2018 07:00  --------------------------------------------------------  IN: 240 mL / OUT: 850 mL / NET: -610 mL    30 Jul 2018 07:01  -  30 Jul 2018 11:19  --------------------------------------------------------  IN: 0 mL / OUT: 250 mL / NET: -250 mL        PHYSICAL EXAM:  GENERAL: NAD, well-developed  HEAD:  Atraumatic, Normocephalic  EYES: EOMI, PERRLA, conjunctiva and sclera clear  NECK: Supple, No JVD  CHEST/LUNG: Clear to auscultation bilaterally; No wheeze  HEART: Regular rate and rhythm; No murmurs, rubs, or gallops  ABDOMEN: Soft, Nontender, Nondistended; Bowel sounds present  EXTREMITIES:  2+ Peripheral Pulses, No clubbing, cyanosis, or edema  PSYCH: AAOx3  NEUROLOGY: non-focal  SKIN: No rashes or lesions    LABS:                        11.4   11.63 )-----------( 215      ( 30 Jul 2018 06:15 )             35.8     Auto Eosinophil # x     / Auto Eosinophil % x     / Auto Neutrophil # x     / Auto Neutrophil % x     / BANDS % x                            10.7   6.82  )-----------( 210      ( 29 Jul 2018 04:40 )             34.2     Auto Eosinophil # x     / Auto Eosinophil % x     / Auto Neutrophil # x     / Auto Neutrophil % x     / BANDS % x        07-30    139  |  101  |  26<H>  ----------------------------<  112<H>  4.0   |  23  |  0.87  07-29    141  |  107  |  20  ----------------------------<  90  4.2   |  23  |  0.66    Ca    8.5      30 Jul 2018 06:15  Mg     2.1     07-30  Phos  3.8     07-30 Patient is a 89y old  Female who presents with a chief complaint of Fall (28 Jul 2018 18:54)      SUBJECTIVE / OVERNIGHT EVENTS:  Patient seen and examined at bedside. She was agitated overnight. She was very lethargic in the morning. she mentioned that it was very painful when she moved and the nurse mentioned that when she is being moved she screams. Sitting still she says she is ok.   No chest pain, no shortness of breath.     Other Review of Systems Negative.    MEDICATIONS  (STANDING):  acetaminophen   Tablet. 650 milliGRAM(s) Oral every 6 hours  amiodarone    Tablet 100 milliGRAM(s) Oral daily  aspirin  chewable 81 milliGRAM(s) Oral daily  atorvastatin 10 milliGRAM(s) Oral at bedtime  busPIRone 5 milliGRAM(s) Oral two times a day  cholecalciferol 400 Unit(s) Oral daily  cyanocobalamin 1000 MICROGram(s) Oral daily  docusate sodium 100 milliGRAM(s) Oral three times a day  enoxaparin Injectable 40 milliGRAM(s) SubCutaneous daily  furosemide    Tablet 20 milliGRAM(s) Oral <User Schedule>  levothyroxine 112 MICROGram(s) Oral daily  lidocaine   Patch 1 Patch Transdermal daily  linaclotide 145 MICROGram(s) Oral daily  LORazepam     Tablet 0.25 milliGRAM(s) Oral <User Schedule>  methenamine hippurate 0.5 Gram(s) Oral two times a day  multivitamin 1 Tablet(s) Oral daily  polyethylene glycol 3350 17 Gram(s) Oral daily  Preservision 1 Capsule(s) 1 Capsule(s) Oral daily  QUEtiapine 25 milliGRAM(s) Oral at bedtime  senna 2 Tablet(s) Oral at bedtime  sodium chloride 0.9%. 1000 milliLiter(s) (50 mL/Hr) IV Continuous <Continuous>  spironolactone 12.5 milliGRAM(s) Oral <User Schedule>    MEDICATIONS  (PRN):  traMADol 25 milliGRAM(s) Oral every 6 hours PRN Severe Pain (7 - 10)      OBJECTIVE:    Vital Signs Last 24 Hrs  T(C): 36.7 (30 Jul 2018 06:09), Max: 36.8 (29 Jul 2018 21:25)  T(F): 98 (30 Jul 2018 06:09), Max: 98.2 (29 Jul 2018 21:25)  HR: 61 (30 Jul 2018 06:09) (61 - 80)  BP: 119/61 (30 Jul 2018 06:09) (119/61 - 132/73)  BP(mean): --  RR: 18 (30 Jul 2018 06:09) (17 - 18)  SpO2: 95% (30 Jul 2018 06:09) (95% - 96%)    CAPILLARY BLOOD GLUCOSE        I&O's Summary    29 Jul 2018 07:01  -  30 Jul 2018 07:00  --------------------------------------------------------  IN: 240 mL / OUT: 850 mL / NET: -610 mL    30 Jul 2018 07:01  -  30 Jul 2018 11:19  --------------------------------------------------------  IN: 0 mL / OUT: 250 mL / NET: -250 mL        PHYSICAL EXAM:  GENERAL: a&o x1-2, bed bound  HEAD:  Atraumatic, Normocephalic  EYES: EOMI, PERRLA, conjunctiva and sclera clear  NECK: Supple, No JVD  CHEST/LUNG: Clear to auscultation bilaterally; No wheeze  HEART: Regular rate and rhythm; No murmurs, rubs, or gallops  ABDOMEN: Soft, Nontender, Nondistended; Bowel sounds present  EXTREMITIES:  2+ Peripheral Pulses, No clubbing, cyanosis, or edema  PSYCH: AAOx3  NEUROLOGY: non-focal  SKIN: No rashes or lesions    LABS:                        11.4   11.63 )-----------( 215      ( 30 Jul 2018 06:15 )             35.8     Auto Eosinophil # x     / Auto Eosinophil % x     / Auto Neutrophil # x     / Auto Neutrophil % x     / BANDS % x                            10.7   6.82  )-----------( 210      ( 29 Jul 2018 04:40 )             34.2     Auto Eosinophil # x     / Auto Eosinophil % x     / Auto Neutrophil # x     / Auto Neutrophil % x     / BANDS % x        07-30    139  |  101  |  26<H>  ----------------------------<  112<H>  4.0   |  23  |  0.87  07-29    141  |  107  |  20  ----------------------------<  90  4.2   |  23  |  0.66    Ca    8.5      30 Jul 2018 06:15  Mg     2.1     07-30  Phos  3.8     07-30

## 2018-07-31 DIAGNOSIS — R33.9 RETENTION OF URINE, UNSPECIFIED: ICD-10-CM

## 2018-07-31 LAB
ALBUMIN SERPL ELPH-MCNC: 2.9 G/DL — LOW (ref 3.3–5)
ALP SERPL-CCNC: 70 U/L — SIGNIFICANT CHANGE UP (ref 40–120)
ALT FLD-CCNC: 11 U/L — SIGNIFICANT CHANGE UP (ref 4–33)
AST SERPL-CCNC: 22 U/L — SIGNIFICANT CHANGE UP (ref 4–32)
BILIRUB SERPL-MCNC: 0.7 MG/DL — SIGNIFICANT CHANGE UP (ref 0.2–1.2)
BUN SERPL-MCNC: 35 MG/DL — HIGH (ref 7–23)
CALCIUM SERPL-MCNC: 8 MG/DL — LOW (ref 8.4–10.5)
CHLORIDE SERPL-SCNC: 105 MMOL/L — SIGNIFICANT CHANGE UP (ref 98–107)
CO2 SERPL-SCNC: 22 MMOL/L — SIGNIFICANT CHANGE UP (ref 22–31)
CREAT SERPL-MCNC: 0.87 MG/DL — SIGNIFICANT CHANGE UP (ref 0.5–1.3)
GLUCOSE SERPL-MCNC: 88 MG/DL — SIGNIFICANT CHANGE UP (ref 70–99)
HCT VFR BLD CALC: 32.7 % — LOW (ref 34.5–45)
HGB BLD-MCNC: 10.4 G/DL — LOW (ref 11.5–15.5)
MAGNESIUM SERPL-MCNC: 2.3 MG/DL — SIGNIFICANT CHANGE UP (ref 1.6–2.6)
MCHC RBC-ENTMCNC: 31.8 % — LOW (ref 32–36)
MCHC RBC-ENTMCNC: 32.9 PG — SIGNIFICANT CHANGE UP (ref 27–34)
MCV RBC AUTO: 103.5 FL — HIGH (ref 80–100)
NRBC # FLD: 0 — SIGNIFICANT CHANGE UP
PHOSPHATE SERPL-MCNC: 2.8 MG/DL — SIGNIFICANT CHANGE UP (ref 2.5–4.5)
PLATELET # BLD AUTO: 202 K/UL — SIGNIFICANT CHANGE UP (ref 150–400)
PMV BLD: 11.7 FL — SIGNIFICANT CHANGE UP (ref 7–13)
POTASSIUM SERPL-MCNC: 3.9 MMOL/L — SIGNIFICANT CHANGE UP (ref 3.5–5.3)
POTASSIUM SERPL-SCNC: 3.9 MMOL/L — SIGNIFICANT CHANGE UP (ref 3.5–5.3)
PROT SERPL-MCNC: 6.3 G/DL — SIGNIFICANT CHANGE UP (ref 6–8.3)
RBC # BLD: 3.16 M/UL — LOW (ref 3.8–5.2)
RBC # FLD: 13.9 % — SIGNIFICANT CHANGE UP (ref 10.3–14.5)
SODIUM SERPL-SCNC: 140 MMOL/L — SIGNIFICANT CHANGE UP (ref 135–145)
WBC # BLD: 5.43 K/UL — SIGNIFICANT CHANGE UP (ref 3.8–10.5)
WBC # FLD AUTO: 5.43 K/UL — SIGNIFICANT CHANGE UP (ref 3.8–10.5)

## 2018-07-31 PROCEDURE — 99233 SBSQ HOSP IP/OBS HIGH 50: CPT | Mod: GC

## 2018-07-31 RX ORDER — QUETIAPINE FUMARATE 200 MG/1
25 TABLET, FILM COATED ORAL
Qty: 0 | Refills: 0 | Status: DISCONTINUED | OUTPATIENT
Start: 2018-07-31 | End: 2018-08-02

## 2018-07-31 RX ORDER — ACETAMINOPHEN WITH CODEINE 300MG-30MG
1 TABLET ORAL EVERY 8 HOURS
Qty: 0 | Refills: 0 | Status: DISCONTINUED | OUTPATIENT
Start: 2018-07-31 | End: 2018-07-31

## 2018-07-31 RX ORDER — TRAMADOL HYDROCHLORIDE 50 MG/1
25 TABLET ORAL EVERY 6 HOURS
Qty: 0 | Refills: 0 | Status: DISCONTINUED | OUTPATIENT
Start: 2018-07-31 | End: 2018-08-02

## 2018-07-31 RX ORDER — ACETAMINOPHEN 500 MG
650 TABLET ORAL EVERY 6 HOURS
Qty: 0 | Refills: 0 | Status: DISCONTINUED | OUTPATIENT
Start: 2018-07-31 | End: 2018-08-02

## 2018-07-31 RX ADMIN — Medication 112 MICROGRAM(S): at 05:57

## 2018-07-31 RX ADMIN — Medication 81 MILLIGRAM(S): at 12:30

## 2018-07-31 RX ADMIN — QUETIAPINE FUMARATE 25 MILLIGRAM(S): 200 TABLET, FILM COATED ORAL at 19:01

## 2018-07-31 RX ADMIN — LIDOCAINE 1 PATCH: 4 CREAM TOPICAL at 12:31

## 2018-07-31 RX ADMIN — SENNA PLUS 2 TABLET(S): 8.6 TABLET ORAL at 21:45

## 2018-07-31 RX ADMIN — PREGABALIN 1000 MICROGRAM(S): 225 CAPSULE ORAL at 12:31

## 2018-07-31 RX ADMIN — Medication 100 MILLIGRAM(S): at 21:45

## 2018-07-31 RX ADMIN — AMIODARONE HYDROCHLORIDE 100 MILLIGRAM(S): 400 TABLET ORAL at 05:57

## 2018-07-31 RX ADMIN — Medication 650 MILLIGRAM(S): at 12:30

## 2018-07-31 RX ADMIN — Medication 0.5 GRAM(S): at 05:57

## 2018-07-31 RX ADMIN — POLYETHYLENE GLYCOL 3350 17 GRAM(S): 17 POWDER, FOR SOLUTION ORAL at 12:31

## 2018-07-31 RX ADMIN — Medication 5 MILLIGRAM(S): at 18:58

## 2018-07-31 RX ADMIN — Medication 100 MILLIGRAM(S): at 15:32

## 2018-07-31 RX ADMIN — Medication 5 MILLIGRAM(S): at 05:57

## 2018-07-31 RX ADMIN — Medication 0.25 MILLIGRAM(S): at 05:58

## 2018-07-31 RX ADMIN — TRAMADOL HYDROCHLORIDE 25 MILLIGRAM(S): 50 TABLET ORAL at 06:02

## 2018-07-31 RX ADMIN — Medication 650 MILLIGRAM(S): at 19:01

## 2018-07-31 RX ADMIN — Medication 0.5 GRAM(S): at 18:58

## 2018-07-31 RX ADMIN — Medication 1 TABLET(S): at 12:31

## 2018-07-31 RX ADMIN — Medication 650 MILLIGRAM(S): at 06:01

## 2018-07-31 RX ADMIN — Medication 100 MILLIGRAM(S): at 05:57

## 2018-07-31 RX ADMIN — Medication 400 UNIT(S): at 12:31

## 2018-07-31 RX ADMIN — LINACLOTIDE 145 MICROGRAM(S): 145 CAPSULE, GELATIN COATED ORAL at 12:31

## 2018-07-31 RX ADMIN — ATORVASTATIN CALCIUM 10 MILLIGRAM(S): 80 TABLET, FILM COATED ORAL at 21:45

## 2018-07-31 RX ADMIN — TRAMADOL HYDROCHLORIDE 25 MILLIGRAM(S): 50 TABLET ORAL at 12:32

## 2018-07-31 RX ADMIN — TRAMADOL HYDROCHLORIDE 25 MILLIGRAM(S): 50 TABLET ORAL at 18:55

## 2018-07-31 RX ADMIN — ENOXAPARIN SODIUM 40 MILLIGRAM(S): 100 INJECTION SUBCUTANEOUS at 12:31

## 2018-07-31 NOTE — CONSULT NOTE ADULT - SUBJECTIVE AND OBJECTIVE BOX
Patient is a 89y old  Female who presents with a chief complaint of Fall (28 Jul 2018 18:54)      HPI:  89 y/o F w/hypothyroidism, PPM, HFpEF (mild diastolic dysfunction 2016), dementia (AAOx2 at baseline), macular degeneration, hemorrhagic CVA, recurrent UTI, b/l DVT (off AC since 2014 following CVA) presenting following an unwitnessed fall. Pt was laying in bed this AM and went to get up, as she normally does, using the handle bars of her commode near the bed to assist her. Pt R hand reportedly slipped off of the handle bar while she was getting up and she fell to her R side. She fell into the wall and then down to the ground. Pt did not hit her head and was completely awake/aware throughout this entire encounter. Denies any LOC, dizziness, CP, palpitations, SOB prior to her fall. Pt was helped back up to her bed but began complaining of severe R rib pain. She described the pain as a sharp, constant non radiating pain exacerbated w/ sharp movements and deep inspiration. No reported fevers, chills, recent URI, cough, abd pain, dysuria, or diarrhea.     In the ED, pt afebrile (98.6F), -163/65-78, HR 60-64, RR 17 (100%RA). Labs   notable for UA w/ large LE and >50WBC. CTH negative. Xray ribs shows minimally displaced right lateral 5th rib fracture. Pt given Lidocaine patch and admitted to medicine (27 Jul 2018 21:46)      PAST MEDICAL & SURGICAL HISTORY:  Hypothyroidism, unspecified type  Macular degeneration  Post menopausal problems: ~ bleeding (no previous evaluation for same)  Cerebrovascular accident (CVA) due to other mechanism: ~ hemorrhagic  Hard of hearing, bilateral  DVT (deep venous thrombosis)  Kidney tumor  CHF (Congestive Heart Failure): ~ diastolic, Stage I (05/20/2016)  Dementia  S/P IVC filter      MEDICATIONS  (STANDING):  acetaminophen   Tablet. 650 milliGRAM(s) Oral every 6 hours  amiodarone    Tablet 100 milliGRAM(s) Oral daily  aspirin  chewable 81 milliGRAM(s) Oral daily  atorvastatin 10 milliGRAM(s) Oral at bedtime  busPIRone 5 milliGRAM(s) Oral two times a day  cholecalciferol 400 Unit(s) Oral daily  cyanocobalamin 1000 MICROGram(s) Oral daily  docusate sodium 100 milliGRAM(s) Oral three times a day  enoxaparin Injectable 40 milliGRAM(s) SubCutaneous daily  furosemide    Tablet 20 milliGRAM(s) Oral <User Schedule>  levothyroxine 112 MICROGram(s) Oral daily  lidocaine   Patch 1 Patch Transdermal daily  linaclotide 145 MICROGram(s) Oral daily  LORazepam     Tablet 0.25 milliGRAM(s) Oral <User Schedule>  methenamine hippurate 0.5 Gram(s) Oral two times a day  multivitamin 1 Tablet(s) Oral daily  polyethylene glycol 3350 17 Gram(s) Oral daily  Preservision 1 Capsule(s) 1 Capsule(s) Oral daily  QUEtiapine 25 milliGRAM(s) Oral at bedtime  senna 2 Tablet(s) Oral at bedtime  sodium chloride 0.9%. 1000 milliLiter(s) (50 mL/Hr) IV Continuous <Continuous>  spironolactone 12.5 milliGRAM(s) Oral <User Schedule>  traMADol 25 milliGRAM(s) Oral every 6 hours    MEDICATIONS  (PRN):      ICU Vital Signs Last 24 Hrs  T(C): 36.6 (31 Jul 2018 12:45), Max: 36.8 (30 Jul 2018 22:41)  T(F): 97.9 (31 Jul 2018 12:45), Max: 98.3 (30 Jul 2018 22:41)  HR: 60 (31 Jul 2018 12:45) (60 - 67)  BP: 124/54 (31 Jul 2018 12:45) (100/50 - 131/50)  BP(mean): --  ABP: --  ABP(mean): --  RR: 17 (31 Jul 2018 12:45) (17 - 18)  SpO2: 96% (31 Jul 2018 12:45) (93% - 96%)      Vital Signs Last 24 Hrs  T(C): 36.6 (31 Jul 2018 12:45), Max: 36.8 (30 Jul 2018 22:41)  T(F): 97.9 (31 Jul 2018 12:45), Max: 98.3 (30 Jul 2018 22:41)  HR: 60 (31 Jul 2018 12:45) (60 - 67)  BP: 124/54 (31 Jul 2018 12:45) (100/50 - 131/50)  BP(mean): --  RR: 17 (31 Jul 2018 12:45) (17 - 18)  SpO2: 96% (31 Jul 2018 12:45) (93% - 96%)                          10.4   5.43  )-----------( 202      ( 31 Jul 2018 05:47 )             32.7       LIVER FUNCTIONS - ( 31 Jul 2018 05:47 )  Alb: 2.9 g/dL / Pro: 6.3 g/dL / ALK PHOS: 70 u/L / ALT: 11 u/L / AST: 22 u/L / GGT: x             Subjective: "I really don't have pain right now. Before when I moved around or tried to it would hurt but now I think I'm ok."    Objective: Pt. alert sitting in bed watching tv with private aid at bed side. Pt. hard of hearing, has hearing aid in right ear. able to twist upper body to right and left with no signs of pain or discomfort.       COMMENTS/PLAN:  1. Add gabapentin 200mg BID  2. Add NSAID of choice standing for 2days then PRN  3. Continue Tylenol as ordered   4. Continue Lidoderm patch as ordered   5. Nerve Block not an option as patents pain con be controlled by oral analgesics and procedure can not be done on a unmonitored floor as per anesthesia. Patient is a 89y old  Female who presents with a chief complaint of Fall (28 Jul 2018 18:54)      HPI:  89 y/o F w/hypothyroidism, PPM, HFpEF (mild diastolic dysfunction 2016), dementia (AAOx2 at baseline), macular degeneration, hemorrhagic CVA, recurrent UTI, b/l DVT (off AC since 2014 following CVA) presenting following an unwitnessed fall. Pt was laying in bed this AM and went to get up, as she normally does, using the handle bars of her commode near the bed to assist her. Pt R hand reportedly slipped off of the handle bar while she was getting up and she fell to her R side. She fell into the wall and then down to the ground. Pt did not hit her head and was completely awake/aware throughout this entire encounter. Denies any LOC, dizziness, CP, palpitations, SOB prior to her fall. Pt was helped back up to her bed but began complaining of severe R rib pain. She described the pain as a sharp, constant non radiating pain exacerbated w/ sharp movements and deep inspiration. No reported fevers, chills, recent URI, cough, abd pain, dysuria, or diarrhea.     In the ED, pt afebrile (98.6F), -163/65-78, HR 60-64, RR 17 (100%RA). Labs   notable for UA w/ large LE and >50WBC. CTH negative. Xray ribs shows minimally displaced right lateral 5th rib fracture. Pt given Lidocaine patch and admitted to medicine (27 Jul 2018 21:46)      PAST MEDICAL & SURGICAL HISTORY:  Hypothyroidism, unspecified type  Macular degeneration  Post menopausal problems: ~ bleeding (no previous evaluation for same)  Cerebrovascular accident (CVA) due to other mechanism: ~ hemorrhagic  Hard of hearing, bilateral  DVT (deep venous thrombosis)  Kidney tumor  CHF (Congestive Heart Failure): ~ diastolic, Stage I (05/20/2016)  Dementia  S/P IVC filter      MEDICATIONS  (STANDING):  acetaminophen   Tablet. 650 milliGRAM(s) Oral every 6 hours  amiodarone    Tablet 100 milliGRAM(s) Oral daily  aspirin  chewable 81 milliGRAM(s) Oral daily  atorvastatin 10 milliGRAM(s) Oral at bedtime  busPIRone 5 milliGRAM(s) Oral two times a day  cholecalciferol 400 Unit(s) Oral daily  cyanocobalamin 1000 MICROGram(s) Oral daily  docusate sodium 100 milliGRAM(s) Oral three times a day  enoxaparin Injectable 40 milliGRAM(s) SubCutaneous daily  furosemide    Tablet 20 milliGRAM(s) Oral <User Schedule>  levothyroxine 112 MICROGram(s) Oral daily  lidocaine   Patch 1 Patch Transdermal daily  linaclotide 145 MICROGram(s) Oral daily  LORazepam     Tablet 0.25 milliGRAM(s) Oral <User Schedule>  methenamine hippurate 0.5 Gram(s) Oral two times a day  multivitamin 1 Tablet(s) Oral daily  polyethylene glycol 3350 17 Gram(s) Oral daily  Preservision 1 Capsule(s) 1 Capsule(s) Oral daily  QUEtiapine 25 milliGRAM(s) Oral at bedtime  senna 2 Tablet(s) Oral at bedtime  sodium chloride 0.9%. 1000 milliLiter(s) (50 mL/Hr) IV Continuous <Continuous>  spironolactone 12.5 milliGRAM(s) Oral <User Schedule>  traMADol 25 milliGRAM(s) Oral every 6 hours    MEDICATIONS  (PRN):      ICU Vital Signs Last 24 Hrs  T(C): 36.6 (31 Jul 2018 12:45), Max: 36.8 (30 Jul 2018 22:41)  T(F): 97.9 (31 Jul 2018 12:45), Max: 98.3 (30 Jul 2018 22:41)  HR: 60 (31 Jul 2018 12:45) (60 - 67)  BP: 124/54 (31 Jul 2018 12:45) (100/50 - 131/50)  BP(mean): --  ABP: --  ABP(mean): --  RR: 17 (31 Jul 2018 12:45) (17 - 18)  SpO2: 96% (31 Jul 2018 12:45) (93% - 96%)      Vital Signs Last 24 Hrs  T(C): 36.6 (31 Jul 2018 12:45), Max: 36.8 (30 Jul 2018 22:41)  T(F): 97.9 (31 Jul 2018 12:45), Max: 98.3 (30 Jul 2018 22:41)  HR: 60 (31 Jul 2018 12:45) (60 - 67)  BP: 124/54 (31 Jul 2018 12:45) (100/50 - 131/50)  BP(mean): --  RR: 17 (31 Jul 2018 12:45) (17 - 18)  SpO2: 96% (31 Jul 2018 12:45) (93% - 96%)                          10.4   5.43  )-----------( 202      ( 31 Jul 2018 05:47 )             32.7       LIVER FUNCTIONS - ( 31 Jul 2018 05:47 )  Alb: 2.9 g/dL / Pro: 6.3 g/dL / ALK PHOS: 70 u/L / ALT: 11 u/L / AST: 22 u/L / GGT: x             Subjective: "I really don't have pain right now. Before when I moved around or tried to it would hurt but now I think I'm ok."    Objective: Pt. alert sitting in bed watching tv with private aid at bed side. Pt. hard of hearing, has hearing aid in right ear. able to twist upper body to right and left with no signs of pain or discomfort.       COMMENTS/PLAN:  1. Consider gabapentin 200mg BID  2. Consider NSAID of choice standing for 2days then PRN  3. Continue Tylenol as ordered   4. Continue Lidoderm patch as ordered   5. Nerve Block not an option as patents pain con be controlled by oral analgesics and procedure can not be done on a unmonitored floor as per anesthesia.

## 2018-07-31 NOTE — PROGRESS NOTE ADULT - PROBLEM SELECTOR PLAN 1
- Minimally displaced right lateral 5th rib fracture 2/2 mechanical fall  - Limited ability to accomplish ADLs due to pain   - Pain control tramadol 25 mg. Consult acute pain.   - hold off on oxycodone due to urinary retention (s/p 2 straight caths)  - f/u UA  - CXR w/ no evidence of pneumothorax.  - Incentive spirometry  - Can Anesthesia Acute pain for nerve block, will see patient when possible most likely tomorrow. - Minimally displaced right lateral 5th rib fracture 2/2 mechanical fall  - Limited ability to accomplish ADLs due to pain   - Pain control tramadol 25 mg. Consult acute pain.   - hold off on oxycodone due to urinary retention (s/p 2 straight caths)  - f/u UA  - CXR w/ no evidence of pneumothorax.  - Incentive spirometry  - Can Anesthesia Acute pain for nerve block, will see patient when possible

## 2018-07-31 NOTE — SWALLOW BEDSIDE ASSESSMENT ADULT - COMMENTS
MD orders received. Chart reviewed. Patient was received lethargic, briefly moving head in response to multimodality stimuli, though unable to safely administer PO trials at this time. As per HHA, patient became more lethargic after receiving medication this AM. Per discussion with Team 4, SLP to follow up for full dysphagia evaluation as schedule permits.
Patient seen upright in bed.  Patient awake, cooperative with maximum encouragement from home health aide at side of bed.  Patient fed by home health aide and was accepting of trials from her [facility].

## 2018-07-31 NOTE — PROGRESS NOTE ADULT - SUBJECTIVE AND OBJECTIVE BOX
Patient is a 89y old  Female who presents with a chief complaint of Fall (2018 18:54)      SUBJECTIVE / OVERNIGHT EVENTS:  Patient seen and examined at bedside. No acute events overnight. According to the night nurse, she had no events of screaming in pain overnight.     Other Review of Systems Negative.    MEDICATIONS  (STANDING):  acetaminophen   Tablet. 650 milliGRAM(s) Oral every 6 hours  amiodarone    Tablet 100 milliGRAM(s) Oral daily  aspirin  chewable 81 milliGRAM(s) Oral daily  atorvastatin 10 milliGRAM(s) Oral at bedtime  busPIRone 5 milliGRAM(s) Oral two times a day  cholecalciferol 400 Unit(s) Oral daily  cyanocobalamin 1000 MICROGram(s) Oral daily  docusate sodium 100 milliGRAM(s) Oral three times a day  enoxaparin Injectable 40 milliGRAM(s) SubCutaneous daily  furosemide    Tablet 20 milliGRAM(s) Oral <User Schedule>  levothyroxine 112 MICROGram(s) Oral daily  lidocaine   Patch 1 Patch Transdermal daily  linaclotide 145 MICROGram(s) Oral daily  LORazepam     Tablet 0.25 milliGRAM(s) Oral <User Schedule>  methenamine hippurate 0.5 Gram(s) Oral two times a day  multivitamin 1 Tablet(s) Oral daily  polyethylene glycol 3350 17 Gram(s) Oral daily  Preservision 1 Capsule(s) 1 Capsule(s) Oral daily  QUEtiapine 25 milliGRAM(s) Oral at bedtime  senna 2 Tablet(s) Oral at bedtime  sodium chloride 0.9%. 1000 milliLiter(s) (50 mL/Hr) IV Continuous <Continuous>  spironolactone 12.5 milliGRAM(s) Oral <User Schedule>  traMADol 25 milliGRAM(s) Oral every 6 hours    MEDICATIONS  (PRN):      OBJECTIVE:    Vital Signs Last 24 Hrs  T(C): 36.6 (2018 12:45), Max: 36.8 (2018 22:41)  T(F): 97.9 (2018 12:45), Max: 98.3 (2018 22:41)  HR: 60 (2018 12:45) (60 - 67)  BP: 124/54 (2018 12:45) (100/50 - 131/50)  BP(mean): --  RR: 17 (2018 12:45) (17 - 18)  SpO2: 96% (2018 12:45) (93% - 97%)    CAPILLARY BLOOD GLUCOSE        I&O's Summary    2018 07:01  -  2018 07:00  --------------------------------------------------------  IN: 0 mL / OUT: 1030 mL / NET: -1030 mL        PHYSICAL EXAM:  GENERAL: a&o x1-2, bed bound  HEAD:  Atraumatic, Normocephalic  EYES: EOMI, PERRLA, conjunctiva and sclera clear  NECK: Supple, No JVD  CHEST/LUNG: Clear to auscultation bilaterally; No wheeze  HEART: Regular rate and rhythm; No murmurs, rubs, or gallops  ABDOMEN: Soft, Nontender, Nondistended; Bowel sounds present  EXTREMITIES:  2+ Peripheral Pulses, No clubbing, cyanosis, or edema  PSYCH: AAOx3  NEUROLOGY: non-focal  SKIN: No rashes or lesions    LABS:                        10.4   5.43  )-----------( 202      ( 2018 05:47 )             32.7     Auto Eosinophil # x     / Auto Eosinophil % x     / Auto Neutrophil # x     / Auto Neutrophil % x     / BANDS % x                            11.4   11.63 )-----------( 215      ( 2018 06:15 )             35.8     Auto Eosinophil # x     / Auto Eosinophil % x     / Auto Neutrophil # x     / Auto Neutrophil % x     / BANDS % x            140  |  105  |  35<H>  ----------------------------<  88  3.9   |  22  |  0.87      139  |  101  |  26<H>  ----------------------------<  112<H>  4.0   |  23  |  0.87    Ca    8.0<L>      2018 05:47  Mg     2.3       Phos  2.8       TPro  6.3  /  Alb  2.9<L>  /  TBili  0.7  /  DBili  x   /  AST  22  /  ALT  11  /  AlkPhos  70  07-31          Urinalysis Basic - ( 2018 10:30 )    Color: YELLOW / Appearance: CLEAR / S.011 / pH: 5.0  Gluc: NEGATIVE / Ketone: NEGATIVE  / Bili: NEGATIVE / Urobili: NORMAL mg/dL   Blood: TRACE / Protein: NEGATIVE mg/dL / Nitrite: NEGATIVE   Leuk Esterase: NEGATIVE / RBC: 2-5 / WBC 2-5   Sq Epi: OCC / Non Sq Epi: x / Bacteria: FEW Patient is a 89y old  Female who presents with a chief complaint of Fall (2018 18:54)      SUBJECTIVE / OVERNIGHT EVENTS:  Patient seen and examined at bedside. No acute events overnight. According to the night nurse, she had no events of screaming in pain overnight.     Other Review of Systems Negative.    MEDICATIONS  (STANDING):  acetaminophen   Tablet. 650 milliGRAM(s) Oral every 6 hours  amiodarone    Tablet 100 milliGRAM(s) Oral daily  aspirin  chewable 81 milliGRAM(s) Oral daily  atorvastatin 10 milliGRAM(s) Oral at bedtime  busPIRone 5 milliGRAM(s) Oral two times a day  cholecalciferol 400 Unit(s) Oral daily  cyanocobalamin 1000 MICROGram(s) Oral daily  docusate sodium 100 milliGRAM(s) Oral three times a day  enoxaparin Injectable 40 milliGRAM(s) SubCutaneous daily  furosemide    Tablet 20 milliGRAM(s) Oral <User Schedule>  levothyroxine 112 MICROGram(s) Oral daily  lidocaine   Patch 1 Patch Transdermal daily  linaclotide 145 MICROGram(s) Oral daily  LORazepam     Tablet 0.25 milliGRAM(s) Oral <User Schedule>  methenamine hippurate 0.5 Gram(s) Oral two times a day  multivitamin 1 Tablet(s) Oral daily  polyethylene glycol 3350 17 Gram(s) Oral daily  Preservision 1 Capsule(s) 1 Capsule(s) Oral daily  QUEtiapine 25 milliGRAM(s) Oral at bedtime  senna 2 Tablet(s) Oral at bedtime  sodium chloride 0.9%. 1000 milliLiter(s) (50 mL/Hr) IV Continuous <Continuous>  spironolactone 12.5 milliGRAM(s) Oral <User Schedule>  traMADol 25 milliGRAM(s) Oral every 6 hours    MEDICATIONS  (PRN):      OBJECTIVE:    Vital Signs Last 24 Hrs  T(C): 36.6 (2018 12:45), Max: 36.8 (2018 22:41)  T(F): 97.9 (2018 12:45), Max: 98.3 (2018 22:41)  HR: 60 (2018 12:45) (60 - 67)  BP: 124/54 (2018 12:45) (100/50 - 131/50)  BP(mean): --  RR: 17 (2018 12:45) (17 - 18)  SpO2: 96% (2018 12:45) (93% - 97%)    CAPILLARY BLOOD GLUCOSE        I&O's Summary    2018 07:01  -  2018 07:00  --------------------------------------------------------  IN: 0 mL / OUT: 1030 mL / NET: -1030 mL        PHYSICAL EXAM:  GENERAL: a&o x1-2, bed bound  HEAD:  Atraumatic, Normocephalic  EYES: EOMI, PERRLA, conjunctiva and sclera clear  NECK: Supple, No JVD  CHEST/LUNG: Clear to auscultation bilaterally; No wheeze  HEART: Regular rate and rhythm; No murmurs, rubs, or gallops  ABDOMEN: Soft, Nontender, Nondistended; Bowel sounds present  EXTREMITIES:  2+ Peripheral Pulses, No clubbing, cyanosis, or edema  PSYCH: AAOx3  NEUROLOGY: non-focal  SKIN: No rashes or lesions    LABS:                        10.4   5.43  )-----------( 202      ( 2018 05:47 )             32.7     Auto Eosinophil # x     / Auto Eosinophil % x     / Auto Neutrophil # x     / Auto Neutrophil % x     / BANDS % x                            11.4   11.63 )-----------( 215      ( 2018 06:15 )             35.8     Auto Eosinophil # x     / Auto Eosinophil % x     / Auto Neutrophil # x     / Auto Neutrophil % x     / BANDS % x            140  |  105  |  35<H>  ----------------------------<  88  3.9   |  22  |  0.87      139  |  101  |  26<H>  ----------------------------<  112<H>  4.0   |  23  |  0.87    Ca    8.0<L>      2018 05:47  Mg     2.3       Phos  2.8       TPro  6.3  /  Alb  2.9<L>  /  TBili  0.7  /  DBili  x   /  AST  22  /  ALT  11  /  AlkPhos  70  07-31          Urinalysis Basic - ( 2018 10:30 )    Color: YELLOW / Appearance: CLEAR / S.011 / pH: 5.0  Gluc: NEGATIVE / Ketone: NEGATIVE  / Bili: NEGATIVE / Urobili: NORMAL mg/dL   Blood: TRACE / Protein: NEGATIVE mg/dL / Nitrite: NEGATIVE   Leuk Esterase: NEGATIVE / RBC: 2-5 / WBC 2-5   Sq Epi: OCC / Non Sq Epi: x / Bacteria: FEW      CASE DISCUSSED WITH: Anesthesia Acute Pain ([facility] [place]) re: nerve block

## 2018-07-31 NOTE — PROGRESS NOTE ADULT - PROBLEM SELECTOR PLAN 3
Asymptomatic   - UA + LE and 50>WBC which could potentially have precipitated this event however pt afebrile, non toxic appearing, no suprapubic tenderness/dysuria, or leukocytosis at this time.   - Squamous epithelium in UA pointing towards dirty catch. Monitor off abx at this time. UCx negative  -f/u repeat UA -Patient s/p 3 straight catheterizations and would be eligible for hensley  -Family wishing to speak with urology prior to placement of hensley catheter, will consult them  -UA x 2 negative for infection as etiology  -Suspect likely in the setting of poor mobility, other possibility would be medication related, but patient developed urinary retention before starting tramadol. Received oxycodone before but would suspect this would be out of her system by now

## 2018-07-31 NOTE — SWALLOW BEDSIDE ASSESSMENT ADULT - SLP PERTINENT HISTORY OF CURRENT PROBLEM
89 y/o F w/ a pmh significant for hypothyroidism, PPM, HFpEF (mild diastolic dysfunction 2016), dementia (AAOx2 at baseline), macular degeneration, hemorrhagic CVA, recurrent UTI, b/l DVT (off AC since 2014 following CVA) admitted s/p mechanical fall c/b Rib fracture, asymptomatic bacteruria,  and dehydration

## 2018-07-31 NOTE — PROGRESS NOTE ADULT - PROBLEM SELECTOR PLAN 4
- Likely component of dehydration as well that could have contributed to fall as pt w/ dry mucous membranes, Bun: Cr>20, with recent decreased PO intake.  - Encourage PO hydration. NS @50cc/h stop after 10h.  - Nutrition consult  - per HHA, at bedside, patient on thickened liquids at home and small chopped regular diet; aspiration precautions, dysphagia diet with thickened liquids Asymptomatic   - UA + LE and 50>WBC which could potentially have precipitated this event however pt afebrile, non toxic appearing, no suprapubic tenderness/dysuria, or leukocytosis at this time.   - Squamous epithelium in UA pointing towards dirty catch. Monitor off abx at this time. UCx negative  -f/u repeat UA

## 2018-07-31 NOTE — PROGRESS NOTE ADULT - ASSESSMENT
89 y/o F w/hypothyroidism, PPM, HFpEF (mild diastolic dysfunction 2016), dementia (AAOx2 at baseline), macular degeneration, hemorrhagic CVA, recurrent UTI, b/l DVT (off AC since 2014 following CVA) admitted s/p mechanical fall c/b Rib fracture found to have asymptomatic pyuria. 89 y/o F w/hypothyroidism, PPM, HFpEF (mild diastolic dysfunction 2016), dementia (AAOx2 at baseline), macular degeneration, hemorrhagic CVA, recurrent UTI, b/l DVT (off AC since 2014 following CVA) admitted s/p mechanical fall c/b Rib fracture found to have asymptomatic pyuria course now c/b urinary retention. 89 y/o F w/hypothyroidism, PPM, HFpEF (mild diastolic dysfunction 2016), dementia (AAOx2 at baseline), macular degeneration, hemorrhagic CVA, recurrent UTI, b/l DVT (off AC since 2014 following CVA) admitted s/p mechanical fall c/b Rib fracture found to have asymptomatic pyuria course now c/b urinary retention at this time.

## 2018-07-31 NOTE — SWALLOW BEDSIDE ASSESSMENT ADULT - SWALLOW EVAL: DIAGNOSIS
1.) Patient presents with adequate oral stage skills for puree, soft solids and thin liquids marked by adequate bolus acceptance, formation, transfer and clearance.  2.) Patient presented with mild oral stage dysphagia for solids marked by increased mastication time.  3.) Pharyngeal stage of swallow for puree, soft solids, solids, and thin liquids marked by timely swallow trigger, adequate hyolaryngeal elevation and no overt s/s of penetration/aspiration.

## 2018-08-01 LAB
BUN SERPL-MCNC: 34 MG/DL — HIGH (ref 7–23)
CALCIUM SERPL-MCNC: 8.7 MG/DL — SIGNIFICANT CHANGE UP (ref 8.4–10.5)
CHLORIDE SERPL-SCNC: 105 MMOL/L — SIGNIFICANT CHANGE UP (ref 98–107)
CO2 SERPL-SCNC: 22 MMOL/L — SIGNIFICANT CHANGE UP (ref 22–31)
CREAT SERPL-MCNC: 0.7 MG/DL — SIGNIFICANT CHANGE UP (ref 0.5–1.3)
GLUCOSE SERPL-MCNC: 97 MG/DL — SIGNIFICANT CHANGE UP (ref 70–99)
HCT VFR BLD CALC: 35.1 % — SIGNIFICANT CHANGE UP (ref 34.5–45)
HGB BLD-MCNC: 11.1 G/DL — LOW (ref 11.5–15.5)
MCHC RBC-ENTMCNC: 31.6 % — LOW (ref 32–36)
MCHC RBC-ENTMCNC: 32.6 PG — SIGNIFICANT CHANGE UP (ref 27–34)
MCV RBC AUTO: 103.2 FL — HIGH (ref 80–100)
NRBC # FLD: 0 — SIGNIFICANT CHANGE UP
PLATELET # BLD AUTO: 230 K/UL — SIGNIFICANT CHANGE UP (ref 150–400)
PMV BLD: 11.2 FL — SIGNIFICANT CHANGE UP (ref 7–13)
POTASSIUM SERPL-MCNC: 4.4 MMOL/L — SIGNIFICANT CHANGE UP (ref 3.5–5.3)
POTASSIUM SERPL-SCNC: 4.4 MMOL/L — SIGNIFICANT CHANGE UP (ref 3.5–5.3)
RBC # BLD: 3.4 M/UL — LOW (ref 3.8–5.2)
RBC # FLD: 13.9 % — SIGNIFICANT CHANGE UP (ref 10.3–14.5)
SODIUM SERPL-SCNC: 141 MMOL/L — SIGNIFICANT CHANGE UP (ref 135–145)
WBC # BLD: 7 K/UL — SIGNIFICANT CHANGE UP (ref 3.8–10.5)
WBC # FLD AUTO: 7 K/UL — SIGNIFICANT CHANGE UP (ref 3.8–10.5)

## 2018-08-01 PROCEDURE — 99232 SBSQ HOSP IP/OBS MODERATE 35: CPT | Mod: GC

## 2018-08-01 RX ORDER — CHLORHEXIDINE GLUCONATE 213 G/1000ML
1 SOLUTION TOPICAL ONCE
Qty: 0 | Refills: 0 | Status: COMPLETED | OUTPATIENT
Start: 2018-08-01 | End: 2018-08-01

## 2018-08-01 RX ADMIN — Medication 5 MILLIGRAM(S): at 18:21

## 2018-08-01 RX ADMIN — Medication 0.5 GRAM(S): at 06:01

## 2018-08-01 RX ADMIN — TRAMADOL HYDROCHLORIDE 25 MILLIGRAM(S): 50 TABLET ORAL at 12:47

## 2018-08-01 RX ADMIN — Medication 400 UNIT(S): at 12:49

## 2018-08-01 RX ADMIN — Medication 81 MILLIGRAM(S): at 12:48

## 2018-08-01 RX ADMIN — SENNA PLUS 2 TABLET(S): 8.6 TABLET ORAL at 23:07

## 2018-08-01 RX ADMIN — CHLORHEXIDINE GLUCONATE 1 APPLICATION(S): 213 SOLUTION TOPICAL at 12:20

## 2018-08-01 RX ADMIN — Medication 650 MILLIGRAM(S): at 12:48

## 2018-08-01 RX ADMIN — QUETIAPINE FUMARATE 25 MILLIGRAM(S): 200 TABLET, FILM COATED ORAL at 18:21

## 2018-08-01 RX ADMIN — AMIODARONE HYDROCHLORIDE 100 MILLIGRAM(S): 400 TABLET ORAL at 05:59

## 2018-08-01 RX ADMIN — POLYETHYLENE GLYCOL 3350 17 GRAM(S): 17 POWDER, FOR SOLUTION ORAL at 12:48

## 2018-08-01 RX ADMIN — LIDOCAINE 1 PATCH: 4 CREAM TOPICAL at 12:47

## 2018-08-01 RX ADMIN — Medication 650 MILLIGRAM(S): at 06:00

## 2018-08-01 RX ADMIN — Medication 650 MILLIGRAM(S): at 18:24

## 2018-08-01 RX ADMIN — Medication 1 TABLET(S): at 12:48

## 2018-08-01 RX ADMIN — ATORVASTATIN CALCIUM 10 MILLIGRAM(S): 80 TABLET, FILM COATED ORAL at 23:07

## 2018-08-01 RX ADMIN — Medication 0.5 GRAM(S): at 18:20

## 2018-08-01 RX ADMIN — Medication 100 MILLIGRAM(S): at 14:19

## 2018-08-01 RX ADMIN — ENOXAPARIN SODIUM 40 MILLIGRAM(S): 100 INJECTION SUBCUTANEOUS at 12:48

## 2018-08-01 RX ADMIN — LIDOCAINE 1 PATCH: 4 CREAM TOPICAL at 00:15

## 2018-08-01 RX ADMIN — Medication 100 MILLIGRAM(S): at 23:07

## 2018-08-01 RX ADMIN — Medication 112 MICROGRAM(S): at 05:59

## 2018-08-01 RX ADMIN — Medication 5 MILLIGRAM(S): at 05:59

## 2018-08-01 RX ADMIN — Medication 0.25 MILLIGRAM(S): at 06:01

## 2018-08-01 RX ADMIN — PREGABALIN 1000 MICROGRAM(S): 225 CAPSULE ORAL at 12:48

## 2018-08-01 RX ADMIN — TRAMADOL HYDROCHLORIDE 25 MILLIGRAM(S): 50 TABLET ORAL at 06:00

## 2018-08-01 RX ADMIN — LINACLOTIDE 145 MICROGRAM(S): 145 CAPSULE, GELATIN COATED ORAL at 12:49

## 2018-08-01 RX ADMIN — Medication 100 MILLIGRAM(S): at 05:59

## 2018-08-01 RX ADMIN — TRAMADOL HYDROCHLORIDE 25 MILLIGRAM(S): 50 TABLET ORAL at 18:19

## 2018-08-01 NOTE — CONSULT NOTE ADULT - ASSESSMENT
89 yo F with retention after suffering rib fracture in fall. Currently bedbound 2/2 pain and taking narcotics    - continue CIC vs hensley  - ensure continues with regular bowel movements  - once ambulatory, with regular bowel movements, and off narcotics reasonable to give trial of void  - case discussed with Dr. Dominguez 89 yo F with retention after suffering rib fracture in fall. Currently bedbound 2/2 pain and taking narcotics    - continue CIC vs hensley  - ensure continues with regular bowel movements  - once ambulatory, with regular bowel movements, and off narcotics reasonable to give trial of void  - outpatient follow-up [facility] [phone]	  - case discussed with Dr. Dominguez

## 2018-08-01 NOTE — CONSULT NOTE ADULT - SUBJECTIVE AND OBJECTIVE BOX
HPI  89 y/o F with recurrent UTI (none since starting methenamine about 1.5 yrs ago), hypothyroidism, PPM, HFpEF (mild diastolic dysfunction 2016), dementia (AAOx2 at baseline), macular degeneration, hemorrhagic CVA, b/l DVT (off AC since 2014 following CVA) presenting following an unwitnessed fall getting up out of bed. She went into retention early this hospitalization and has had off-and-on symptoms of needing to void per family. She is a poor historian 2/2 dementia however on interview denied any sensation of fullness, urgency, frequency, incontinence, hematuria or dysuria, and felt she has been emptying. Denied any LOC, dizziness, CP, palpitations, SOB prior to her fall. No reported fevers, chills, recent URI, cough, abd pain, or diarrhea.         PAST MEDICAL & SURGICAL HISTORY:  Hypothyroidism, unspecified type  Macular degeneration  Post menopausal problems: ~ bleeding (no previous evaluation for same)  Cerebrovascular accident (CVA) due to other mechanism: ~ hemorrhagic  Hard of hearing, bilateral  DVT (deep venous thrombosis)  Kidney tumor  CHF (Congestive Heart Failure): ~ diastolic, Stage I (05/20/2016)  Dementia  S/P IVC filter      MEDICATIONS  (STANDING):  acetaminophen   Tablet. 650 milliGRAM(s) Oral every 6 hours  amiodarone    Tablet 100 milliGRAM(s) Oral daily  aspirin  chewable 81 milliGRAM(s) Oral daily  atorvastatin 10 milliGRAM(s) Oral at bedtime  busPIRone 5 milliGRAM(s) Oral two times a day  cholecalciferol 400 Unit(s) Oral daily  cyanocobalamin 1000 MICROGram(s) Oral daily  docusate sodium 100 milliGRAM(s) Oral three times a day  enoxaparin Injectable 40 milliGRAM(s) SubCutaneous daily  furosemide    Tablet 20 milliGRAM(s) Oral <User Schedule>  levothyroxine 112 MICROGram(s) Oral daily  lidocaine   Patch 1 Patch Transdermal daily  linaclotide 145 MICROGram(s) Oral daily  LORazepam     Tablet 0.25 milliGRAM(s) Oral <User Schedule>  methenamine hippurate 0.5 Gram(s) Oral two times a day  multivitamin 1 Tablet(s) Oral daily  polyethylene glycol 3350 17 Gram(s) Oral daily  Preservision 1 Capsule(s) 1 Capsule(s) Oral daily  QUEtiapine 25 milliGRAM(s) Oral <User Schedule>  senna 2 Tablet(s) Oral at bedtime  sodium chloride 0.9%. 1000 milliLiter(s) (50 mL/Hr) IV Continuous <Continuous>  spironolactone 12.5 milliGRAM(s) Oral <User Schedule>  traMADol 25 milliGRAM(s) Oral every 6 hours    MEDICATIONS  (PRN):      FAMILY HISTORY:  No pertinent family history in first degree relatives      Allergies    No Known Allergies    Intolerances        SOCIAL HISTORY:    REVIEW OF SYSTEMS: Otherwise negative as stated in HPI    Physical Exam  Vital signs  T(C): 36.8 (08-01-18 @ 05:45), Max: 36.8 (08-01-18 @ 05:45)  HR: 60 (08-01-18 @ 05:45)  BP: 131/60 (08-01-18 @ 05:45)  SpO2: 95% (08-01-18 @ 05:45)  Wt(kg): --    Output    UOP    Gen:  NAD    Pulm:  No respiratory distress  	  CV:  RRR    GI:  S/ND/NT    :  No CVAT, no overt prolapse, exam limited in hospital bed    MSK:      LABS:      08-01 @ 06:25    WBC 7.00  / Hct 35.1  / SCr 0.70     07-31 @ 05:47    WBC 5.43  / Hct 32.7  / SCr 0.87     08-01    141  |  105  |  34<H>  ----------------------------<  97  4.4   |  22  |  0.70    Ca    8.7      01 Aug 2018 06:25  Phos  2.8     07-31  Mg     2.3     07-31    TPro  6.3  /  Alb  2.9<L>  /  TBili  0.7  /  DBili  x   /  AST  22  /  ALT  11  /  AlkPhos  70  07-31          Urine Cx:  Blood Cx:    RADIOLOGY:

## 2018-08-01 NOTE — PROGRESS NOTE ADULT - ASSESSMENT
89 y/o F w/hypothyroidism, PPM, HFpEF (mild diastolic dysfunction 2016), dementia (AAOx2 at baseline), macular degeneration, hemorrhagic CVA, recurrent UTI, b/l DVT (off AC since 2014 following CVA) admitted s/p mechanical fall c/b Rib fracture found to have asymptomatic pyuria course now c/b urinary retention at this time. (3) slightly limited

## 2018-08-01 NOTE — PROGRESS NOTE ADULT - PROBLEM SELECTOR PLAN 3
-Patient s/p 3 straight catheterizations and would be eligible for hensley  -Family wishing to speak with urology prior to placement of hensley catheter, will consult them  -UA x 2 negative for infection as etiology  -Suspect likely in the setting of poor mobility, other possibility would be medication related, but patient developed urinary retention before starting tramadol. Received oxycodone before but would suspect this would be out of her system by now -Patient s/p 3 straight catheterizations and folley catheter placement  -trial of void overnight.   -UA x 2 negative for infection as etiology  -Suspect likely in the setting of poor mobility, other possibility would be medication related, but patient developed urinary retention before starting tramadol. Received oxycodone before but would suspect this would be out of her system by now

## 2018-08-01 NOTE — PROGRESS NOTE ADULT - SUBJECTIVE AND OBJECTIVE BOX
Patient is a 89y old  Female who presents with a chief complaint of Fall (2018 18:54)      SUBJECTIVE / OVERNIGHT EVENTS:  Patient seen and examined at bedside.    Other Review of Systems Negative.    MEDICATIONS  (STANDING):  acetaminophen   Tablet. 650 milliGRAM(s) Oral every 6 hours  amiodarone    Tablet 100 milliGRAM(s) Oral daily  aspirin  chewable 81 milliGRAM(s) Oral daily  atorvastatin 10 milliGRAM(s) Oral at bedtime  busPIRone 5 milliGRAM(s) Oral two times a day  cholecalciferol 400 Unit(s) Oral daily  cyanocobalamin 1000 MICROGram(s) Oral daily  docusate sodium 100 milliGRAM(s) Oral three times a day  enoxaparin Injectable 40 milliGRAM(s) SubCutaneous daily  furosemide    Tablet 20 milliGRAM(s) Oral <User Schedule>  levothyroxine 112 MICROGram(s) Oral daily  lidocaine   Patch 1 Patch Transdermal daily  linaclotide 145 MICROGram(s) Oral daily  LORazepam     Tablet 0.25 milliGRAM(s) Oral <User Schedule>  methenamine hippurate 0.5 Gram(s) Oral two times a day  multivitamin 1 Tablet(s) Oral daily  polyethylene glycol 3350 17 Gram(s) Oral daily  Preservision 1 Capsule(s) 1 Capsule(s) Oral daily  QUEtiapine 25 milliGRAM(s) Oral <User Schedule>  senna 2 Tablet(s) Oral at bedtime  sodium chloride 0.9%. 1000 milliLiter(s) (50 mL/Hr) IV Continuous <Continuous>  spironolactone 12.5 milliGRAM(s) Oral <User Schedule>  traMADol 25 milliGRAM(s) Oral every 6 hours    MEDICATIONS  (PRN):      OBJECTIVE:    Vital Signs Last 24 Hrs  T(C): 36.8 (01 Aug 2018 05:45), Max: 36.8 (01 Aug 2018 05:45)  T(F): 98.2 (01 Aug 2018 05:45), Max: 98.2 (01 Aug 2018 05:45)  HR: 60 (01 Aug 2018 05:45) (60 - 60)  BP: 131/60 (01 Aug 2018 05:45) (124/54 - 131/60)  BP(mean): --  RR: 18 (01 Aug 2018 05:45) (17 - 18)  SpO2: 95% (01 Aug 2018 05:45) (94% - 96%)    CAPILLARY BLOOD GLUCOSE        I&O's Summary      PHYSICAL EXAM:  GENERAL: NAD, well-developed  HEAD:  Atraumatic, Normocephalic  EYES: EOMI, PERRLA, conjunctiva and sclera clear  NECK: Supple, No JVD  CHEST/LUNG: Clear to auscultation bilaterally; No wheeze  HEART: Regular rate and rhythm; No murmurs, rubs, or gallops  ABDOMEN: Soft, Nontender, Nondistended; Bowel sounds present  EXTREMITIES:  2+ Peripheral Pulses, No clubbing, cyanosis, or edema  PSYCH: AAOx3  NEUROLOGY: non-focal  SKIN: No rashes or lesions    LABS:                        11.1   7.00  )-----------( 230      ( 01 Aug 2018 06:25 )             35.1     Auto Eosinophil # x     / Auto Eosinophil % x     / Auto Neutrophil # x     / Auto Neutrophil % x     / BANDS % x                            10.4   5.43  )-----------( 202      ( 2018 05:47 )             32.7     Auto Eosinophil # x     / Auto Eosinophil % x     / Auto Neutrophil # x     / Auto Neutrophil % x     / BANDS % x        08-    141  |  105  |  34<H>  ----------------------------<  97  4.4   |  22  |  0.70      140  |  105  |  35<H>  ----------------------------<  88  3.9   |  22  |  0.87    Ca    8.7      01 Aug 2018 06:25  Mg     2.3       Phos  2.8       TPro  6.3  /  Alb  2.9<L>  /  TBili  0.7  /  DBili  x   /  AST  22  /  ALT  11  /  AlkPhos  70            Urinalysis Basic - ( 2018 10:30 )    Color: YELLOW / Appearance: CLEAR / S.011 / pH: 5.0  Gluc: NEGATIVE / Ketone: NEGATIVE  / Bili: NEGATIVE / Urobili: NORMAL mg/dL   Blood: TRACE / Protein: NEGATIVE mg/dL / Nitrite: NEGATIVE   Leuk Esterase: NEGATIVE / RBC: 2-5 / WBC 2-5   Sq Epi: OCC / Non Sq Epi: x / Bacteria: FEW          Care Discussed with Consultants/Other Providers:    RADIOLOGY & ADDITIONAL TESTS:  (Imaging Personally Reviewed) Patient is a 89y old  Female who presents with a chief complaint of Fall (2018 18:54)      SUBJECTIVE / OVERNIGHT EVENTS:  Patient seen and examined at bedside. no acute events overnight. the patient does not report pain and does not appear to be in any discomfort. Nursing reported no events overnight.     Other Review of Systems Negative.    MEDICATIONS  (STANDING):  acetaminophen   Tablet. 650 milliGRAM(s) Oral every 6 hours  amiodarone    Tablet 100 milliGRAM(s) Oral daily  aspirin  chewable 81 milliGRAM(s) Oral daily  atorvastatin 10 milliGRAM(s) Oral at bedtime  busPIRone 5 milliGRAM(s) Oral two times a day  cholecalciferol 400 Unit(s) Oral daily  cyanocobalamin 1000 MICROGram(s) Oral daily  docusate sodium 100 milliGRAM(s) Oral three times a day  enoxaparin Injectable 40 milliGRAM(s) SubCutaneous daily  furosemide    Tablet 20 milliGRAM(s) Oral <User Schedule>  levothyroxine 112 MICROGram(s) Oral daily  lidocaine   Patch 1 Patch Transdermal daily  linaclotide 145 MICROGram(s) Oral daily  LORazepam     Tablet 0.25 milliGRAM(s) Oral <User Schedule>  methenamine hippurate 0.5 Gram(s) Oral two times a day  multivitamin 1 Tablet(s) Oral daily  polyethylene glycol 3350 17 Gram(s) Oral daily  Preservision 1 Capsule(s) 1 Capsule(s) Oral daily  QUEtiapine 25 milliGRAM(s) Oral <User Schedule>  senna 2 Tablet(s) Oral at bedtime  sodium chloride 0.9%. 1000 milliLiter(s) (50 mL/Hr) IV Continuous <Continuous>  spironolactone 12.5 milliGRAM(s) Oral <User Schedule>  traMADol 25 milliGRAM(s) Oral every 6 hours    MEDICATIONS  (PRN):      OBJECTIVE:    Vital Signs Last 24 Hrs  T(C): 36.8 (01 Aug 2018 05:45), Max: 36.8 (01 Aug 2018 05:45)  T(F): 98.2 (01 Aug 2018 05:45), Max: 98.2 (01 Aug 2018 05:45)  HR: 60 (01 Aug 2018 05:45) (60 - 60)  BP: 131/60 (01 Aug 2018 05:45) (124/54 - 131/60)  BP(mean): --  RR: 18 (01 Aug 2018 05:45) (17 - 18)  SpO2: 95% (01 Aug 2018 05:45) (94% - 96%)    CAPILLARY BLOOD GLUCOSE        I&O's Summary      PHYSICAL EXAM:  GENERAL: elderly lady laying in bed  HEAD:  Atraumatic, Normocephalic  EYES: EOMI, PERRLA, conjunctiva and sclera clear  NECK: Supple, No JVD  CHEST/LUNG: Clear to auscultation bilaterally; No wheeze  HEART: Regular rate and rhythm; No murmurs, rubs, or gallops  ABDOMEN: Soft, Nontender, Nondistended; Bowel sounds present  : folley catheter inserted  EXTREMITIES:  2+ Peripheral Pulses, No clubbing, cyanosis, or edema  PSYCH: AAOx1  NEUROLOGY: non-focal  SKIN: No rashes or lesions    LABS:                        11.1   7.00  )-----------( 230      ( 01 Aug 2018 06:25 )             35.1     Auto Eosinophil # x     / Auto Eosinophil % x     / Auto Neutrophil # x     / Auto Neutrophil % x     / BANDS % x                            10.4   5.43  )-----------( 202      ( 2018 05:47 )             32.7     Auto Eosinophil # x     / Auto Eosinophil % x     / Auto Neutrophil # x     / Auto Neutrophil % x     / BANDS % x        08-01    141  |  105  |  34<H>  ----------------------------<  97  4.4   |  22  |  0.70  07-31    140  |  105  |  35<H>  ----------------------------<  88  3.9   |  22  |  0.87    Ca    8.7      01 Aug 2018 06:25  Mg     2.3     -  Phos  2.8       TPro  6.3  /  Alb  2.9<L>  /  TBili  0.7  /  DBili  x   /  AST  22  /  ALT  11  /  AlkPhos  70  07-    Urinalysis Basic - ( 2018 10:30 )    Color: YELLOW / Appearance: CLEAR / S.011 / pH: 5.0  Gluc: NEGATIVE / Ketone: NEGATIVE  / Bili: NEGATIVE / Urobili: NORMAL mg/dL   Blood: TRACE / Protein: NEGATIVE mg/dL / Nitrite: NEGATIVE   Leuk Esterase: NEGATIVE / RBC: 2-5 / WBC 2-5   Sq Epi: OCC / Non Sq Epi: x / Bacteria: FEW Patient is a 89y old  Female who presents with a chief complaint of Fall (2018 18:54)      SUBJECTIVE / OVERNIGHT EVENTS:  Patient seen and examined at bedside. no acute events overnight. the patient does not report pain and does not appear to be in any discomfort. Nursing reported no events overnight.     Other Review of Systems Negative.    MEDICATIONS  (STANDING):  acetaminophen   Tablet. 650 milliGRAM(s) Oral every 6 hours  amiodarone    Tablet 100 milliGRAM(s) Oral daily  aspirin  chewable 81 milliGRAM(s) Oral daily  atorvastatin 10 milliGRAM(s) Oral at bedtime  busPIRone 5 milliGRAM(s) Oral two times a day  cholecalciferol 400 Unit(s) Oral daily  cyanocobalamin 1000 MICROGram(s) Oral daily  docusate sodium 100 milliGRAM(s) Oral three times a day  enoxaparin Injectable 40 milliGRAM(s) SubCutaneous daily  furosemide    Tablet 20 milliGRAM(s) Oral <User Schedule>  levothyroxine 112 MICROGram(s) Oral daily  lidocaine   Patch 1 Patch Transdermal daily  linaclotide 145 MICROGram(s) Oral daily  LORazepam     Tablet 0.25 milliGRAM(s) Oral <User Schedule>  methenamine hippurate 0.5 Gram(s) Oral two times a day  multivitamin 1 Tablet(s) Oral daily  polyethylene glycol 3350 17 Gram(s) Oral daily  Preservision 1 Capsule(s) 1 Capsule(s) Oral daily  QUEtiapine 25 milliGRAM(s) Oral <User Schedule>  senna 2 Tablet(s) Oral at bedtime  sodium chloride 0.9%. 1000 milliLiter(s) (50 mL/Hr) IV Continuous <Continuous>  spironolactone 12.5 milliGRAM(s) Oral <User Schedule>  traMADol 25 milliGRAM(s) Oral every 6 hours    MEDICATIONS  (PRN):      OBJECTIVE:    Vital Signs Last 24 Hrs  T(C): 36.8 (01 Aug 2018 05:45), Max: 36.8 (01 Aug 2018 05:45)  T(F): 98.2 (01 Aug 2018 05:45), Max: 98.2 (01 Aug 2018 05:45)  HR: 60 (01 Aug 2018 05:45) (60 - 60)  BP: 131/60 (01 Aug 2018 05:45) (124/54 - 131/60)  BP(mean): --  RR: 18 (01 Aug 2018 05:45) (17 - 18)  SpO2: 95% (01 Aug 2018 05:45) (94% - 96%)    CAPILLARY BLOOD GLUCOSE        I&O's Summary      PHYSICAL EXAM:  GENERAL: elderly lady laying in bed  HEAD:  Atraumatic, Normocephalic  EYES: EOMI, PERRLA, conjunctiva and sclera clear  NECK: Supple, No JVD  CHEST/LUNG: Clear to auscultation bilaterally; No wheeze  HEART: Regular rate and rhythm; No murmurs, rubs, or gallops  ABDOMEN: Soft, Nontender, Nondistended; Bowel sounds present  : folley catheter inserted  EXTREMITIES:  2+ Peripheral Pulses, No clubbing, cyanosis, or edema  PSYCH: AAOx1  NEUROLOGY: non-focal  SKIN: No rashes or lesions    LABS:                        11.1   7.00  )-----------( 230      ( 01 Aug 2018 06:25 )             35.1     Auto Eosinophil # x     / Auto Eosinophil % x     / Auto Neutrophil # x     / Auto Neutrophil % x     / BANDS % x                            10.4   5.43  )-----------( 202      ( 2018 05:47 )             32.7     Auto Eosinophil # x     / Auto Eosinophil % x     / Auto Neutrophil # x     / Auto Neutrophil % x     / BANDS % x        08-01    141  |  105  |  34<H>  ----------------------------<  97  4.4   |  22  |  0.70  07-31    140  |  105  |  35<H>  ----------------------------<  88  3.9   |  22  |  0.87    Ca    8.7      01 Aug 2018 06:25  Mg     2.3     -  Phos  2.8       TPro  6.3  /  Alb  2.9<L>  /  TBili  0.7  /  DBili  x   /  AST  22  /  ALT  11  /  AlkPhos  70  07-    Urinalysis Basic - ( 2018 10:30 )    Color: YELLOW / Appearance: CLEAR / S.011 / pH: 5.0  Gluc: NEGATIVE / Ketone: NEGATIVE  / Bili: NEGATIVE / Urobili: NORMAL mg/dL   Blood: TRACE / Protein: NEGATIVE mg/dL / Nitrite: NEGATIVE   Leuk Esterase: NEGATIVE / RBC: 2-5 / WBC 2-5   Sq Epi: OCC / Non Sq Epi: x / Bacteria: FEW    CASE DISCUSSED WITH: Urology re: recs for urinary retention

## 2018-08-01 NOTE — PROGRESS NOTE ADULT - PROBLEM SELECTOR PLAN 1
- Minimally displaced right lateral 5th rib fracture 2/2 mechanical fall  - Limited ability to accomplish ADLs due to pain   - Pain control tramadol 25 mg. Consult acute pain.   - hold off on oxycodone due to urinary retention (s/p 2 straight caths)  - f/u UA  - CXR w/ no evidence of pneumothorax.  - Incentive spirometry  - Can Anesthesia Acute pain for nerve block, will see patient when possible - Minimally displaced right lateral 5th rib fracture 2/2 mechanical fall  - Limited ability to accomplish ADLs due to pain   - Pain control tramadol 25 mg. Consult acute pain.   - hold off on oxycodone due to urinary retention (s/p 2 straight caths)  - UA normal  - CXR w/ no evidence of pneumothorax.  - Incentive spirometry

## 2018-08-01 NOTE — PROGRESS NOTE ADULT - PROBLEM SELECTOR PLAN 4
Asymptomatic   - UA + LE and 50>WBC which could potentially have precipitated this event however pt afebrile, non toxic appearing, no suprapubic tenderness/dysuria, or leukocytosis at this time.   - Squamous epithelium in UA pointing towards dirty catch. Monitor off abx at this time. UCx negative  -f/u repeat UA Asymptomatic   - UA + LE and 50>WBC which could potentially have precipitated this event however pt afebrile, non toxic appearing, no suprapubic tenderness/dysuria, or leukocytosis at this time.   - Squamous epithelium in UA pointing towards dirty catch. Monitor off abx at this time. UCx negative  -repeat UA negative

## 2018-08-02 VITALS
HEART RATE: 60 BPM | SYSTOLIC BLOOD PRESSURE: 134 MMHG | OXYGEN SATURATION: 94 % | DIASTOLIC BLOOD PRESSURE: 64 MMHG | RESPIRATION RATE: 19 BRPM | TEMPERATURE: 98 F

## 2018-08-02 LAB
HCT VFR BLD CALC: 38.6 % — SIGNIFICANT CHANGE UP (ref 34.5–45)
HGB BLD-MCNC: 12.3 G/DL — SIGNIFICANT CHANGE UP (ref 11.5–15.5)
MCHC RBC-ENTMCNC: 31.9 % — LOW (ref 32–36)
MCHC RBC-ENTMCNC: 32.9 PG — SIGNIFICANT CHANGE UP (ref 27–34)
MCV RBC AUTO: 103.2 FL — HIGH (ref 80–100)
NRBC # FLD: 0 — SIGNIFICANT CHANGE UP
PLATELET # BLD AUTO: 260 K/UL — SIGNIFICANT CHANGE UP (ref 150–400)
PMV BLD: 10.6 FL — SIGNIFICANT CHANGE UP (ref 7–13)
RBC # BLD: 3.74 M/UL — LOW (ref 3.8–5.2)
RBC # FLD: 13.8 % — SIGNIFICANT CHANGE UP (ref 10.3–14.5)
WBC # BLD: 8.37 K/UL — SIGNIFICANT CHANGE UP (ref 3.8–10.5)
WBC # FLD AUTO: 8.37 K/UL — SIGNIFICANT CHANGE UP (ref 3.8–10.5)

## 2018-08-02 PROCEDURE — 99239 HOSP IP/OBS DSCHRG MGMT >30: CPT

## 2018-08-02 RX ORDER — LIDOCAINE 4 G/100G
0 CREAM TOPICAL
Qty: 0 | Refills: 0 | COMMUNITY
Start: 2018-08-02

## 2018-08-02 RX ORDER — TRAMADOL HYDROCHLORIDE 50 MG/1
0.5 TABLET ORAL
Qty: 0 | Refills: 0 | COMMUNITY
Start: 2018-08-02

## 2018-08-02 RX ORDER — METHENAMINE MANDELATE 1 G
0.5 TABLET ORAL
Qty: 0 | Refills: 0 | COMMUNITY
Start: 2018-08-02

## 2018-08-02 RX ORDER — METHENAMINE MANDELATE 1 G
0.5 TABLET ORAL
Qty: 0 | Refills: 0 | COMMUNITY

## 2018-08-02 RX ORDER — ACETAMINOPHEN 500 MG
2 TABLET ORAL
Qty: 0 | Refills: 0 | COMMUNITY
Start: 2018-08-02

## 2018-08-02 RX ADMIN — SPIRONOLACTONE 12.5 MILLIGRAM(S): 25 TABLET, FILM COATED ORAL at 07:24

## 2018-08-02 RX ADMIN — Medication 1 TABLET(S): at 12:44

## 2018-08-02 RX ADMIN — LIDOCAINE 1 PATCH: 4 CREAM TOPICAL at 12:43

## 2018-08-02 RX ADMIN — Medication 20 MILLIGRAM(S): at 07:24

## 2018-08-02 RX ADMIN — TRAMADOL HYDROCHLORIDE 25 MILLIGRAM(S): 50 TABLET ORAL at 17:55

## 2018-08-02 RX ADMIN — TRAMADOL HYDROCHLORIDE 25 MILLIGRAM(S): 50 TABLET ORAL at 06:07

## 2018-08-02 RX ADMIN — LINACLOTIDE 145 MICROGRAM(S): 145 CAPSULE, GELATIN COATED ORAL at 12:45

## 2018-08-02 RX ADMIN — Medication 5 MILLIGRAM(S): at 06:08

## 2018-08-02 RX ADMIN — Medication 5 MILLIGRAM(S): at 17:57

## 2018-08-02 RX ADMIN — TRAMADOL HYDROCHLORIDE 25 MILLIGRAM(S): 50 TABLET ORAL at 12:41

## 2018-08-02 RX ADMIN — Medication 400 UNIT(S): at 12:44

## 2018-08-02 RX ADMIN — Medication 650 MILLIGRAM(S): at 17:56

## 2018-08-02 RX ADMIN — Medication 650 MILLIGRAM(S): at 12:44

## 2018-08-02 RX ADMIN — Medication 0.5 GRAM(S): at 17:57

## 2018-08-02 RX ADMIN — Medication 100 MILLIGRAM(S): at 17:56

## 2018-08-02 RX ADMIN — QUETIAPINE FUMARATE 25 MILLIGRAM(S): 200 TABLET, FILM COATED ORAL at 17:57

## 2018-08-02 RX ADMIN — ENOXAPARIN SODIUM 40 MILLIGRAM(S): 100 INJECTION SUBCUTANEOUS at 12:44

## 2018-08-02 RX ADMIN — POLYETHYLENE GLYCOL 3350 17 GRAM(S): 17 POWDER, FOR SOLUTION ORAL at 12:44

## 2018-08-02 RX ADMIN — Medication 112 MICROGRAM(S): at 06:07

## 2018-08-02 RX ADMIN — PREGABALIN 1000 MICROGRAM(S): 225 CAPSULE ORAL at 12:49

## 2018-08-02 RX ADMIN — LIDOCAINE 1 PATCH: 4 CREAM TOPICAL at 00:37

## 2018-08-02 RX ADMIN — Medication 0.5 GRAM(S): at 06:07

## 2018-08-02 RX ADMIN — Medication 81 MILLIGRAM(S): at 12:44

## 2018-08-02 RX ADMIN — Medication 650 MILLIGRAM(S): at 06:06

## 2018-08-02 RX ADMIN — Medication 0.25 MILLIGRAM(S): at 06:08

## 2018-08-02 RX ADMIN — AMIODARONE HYDROCHLORIDE 100 MILLIGRAM(S): 400 TABLET ORAL at 06:07

## 2018-08-02 RX ADMIN — Medication 100 MILLIGRAM(S): at 06:07

## 2018-08-02 NOTE — PROGRESS NOTE ADULT - PROBLEM SELECTOR PROBLEM 8
Dementia without behavioral disturbance, unspecified dementia type

## 2018-08-02 NOTE — PROGRESS NOTE ADULT - PROBLEM SELECTOR PLAN 8
- c/w home dose of Seroquel 25mg PO QD, 0.25 Ativan prn agitation  - iSTOP Reference #: [id number]
- c/w home dose of Seroquel 25mg PO QD, 0.25 Ativan prn agitation  - iSTOP Reference #: [id number]
- c/w home dose of Seroquel 25mg PO QD, 0.25 Ativan prn agitation  - iSTOP Reference #: [id number]
- c/w home dose of Seroquel 25mg PO QD, 0.25 Ativan prn agitation  - iSTOP Reference #: [id number]
- c/w home dose of Seroquel 25mg PO QD, 0.25 Ativan prn agitation  - iSTOP Reference #: [id number]
- c/w home dose of Seroquel 25mg PO QD, 0.25 Ativan prn agitation  - iSTOP Reference #: [id number]

## 2018-08-02 NOTE — PROGRESS NOTE ADULT - ATTENDING COMMENTS
Had extensive discussion with son, daughter in law at bedside. Patient has had recent PPM and may be multifactorial.   Called anesthesia acute pain and chronic pain management. Believes that nerve block for acute rib fracture will not be helpful. Will add on IV tylenol and started oxycodone as needed for bed pain/ dressing changes/ and PT.  Called PT to re-eval tomorrow. PT will need to be called again tomorrow morning as different person is covering PT on Sunday.  Possible D/c sunday if pain controlled on oxycodone.  Will give additional day of IVF.   If patient stays until Monday will do PPM on Monday.   Family wants d/c home.
Had extensive discussion with son, daughter in law at bedside. Patient has had recent PPM and presyncopal episode may be multifactorial.   Called anesthesia acute pain and chronic pain management. Believes that nerve block for acute rib fracture will not be helpful.   New urinary retention on oxycodone. Will straight cath, stop oxycodone and start tylenol.  Called PT to re-eval today  Possible D/c Monday if pain controlled on high dose tylenol  Now on home meds of hiprex, linzess  Will contact cards to interrogate  PPM on Monday.   Family wants d/c home not rehab
Patient seen and examined at bedside. Appears comfortable today but confused. Right chest with significant ecchymoses. Son insisting on nerve block placement. Team spoke with anesthesia who report that nerve blocks are not done unless patient is in ICU. This was explained to son at length on multiple occasions but he is still insistent. We have consulted acute pain from anesthesia for further pain management possibilities. Will add lidocaine patch and start Tylenol ATC as well as Tramadol. Await acute pain management follow-up.
Patient seen and examined. Looks well and requesting to get up in chair. Reports pain is currently well-controlled. Pt s/p hensley catheter placement yesterday for urinary retention. Will attempt TOV tonight. If negative, will anticipate dc with hensley to [facility] with trial of void to occur there when patient is more ambulatory. Remaining care as above. Dc planning.
Patient seen and examined at bedside today with acute pain management and PT. Patient awake, alert and able to sit up in bed without issue on lidocaine patch, Tylenol, and tramadol. Acute pain recs appreciated, no plan for nerve block. Will continue with current pain regimen for now given significant improvement. Course now c/b urinary retention of unclear etiology s/p straight cath x 3 with ua negative x 2. Patient would qualify for hensley but will have  see patient. Suspect urinary retention may be in the setting of poor mobility. Encourage OOB to chair and incentive spirometry.
Patient seen and examined at bedside. Looks and feels well. Patient removed her hensley overnight and subsequently urinated thereafter. No evidence of hematuria or trauma on exam. Will repeat H/H to ensure stability. Dc planning to [facility]. Remaining care as above.

## 2018-08-02 NOTE — PROGRESS NOTE ADULT - PROBLEM SELECTOR PLAN 3
-Patient s/p 3 straight catheterizations and folley catheter placement  -trial of void overnight.   -UA x 2 negative for infection as etiology  -Suspect likely in the setting of poor mobility, other possibility would be medication related, but patient developed urinary retention before starting tramadol. Received oxycodone before but would suspect this would be out of her system by now - now resolved. Patient self-removed hensley catheter last night and voided afterwards.   -UA x 2 negative for infection as etiology  -Suspect likely in the setting of poor mobility, other possibility would be medication related, but patient developed urinary retention before starting tramadol. Received oxycodone before but would suspect this would be out of her system by now

## 2018-08-02 NOTE — PROGRESS NOTE ADULT - PROBLEM SELECTOR PLAN 10
- Diet: dysphagia diet, thickened liquids; aspiration precautions   - DVT PPx: Lovenox 40mg QD  - DNR/DNI - Diet: dysphagia diet, thickened liquids; aspiration precautions   - DVT PPx: Lovenox 40mg QD  - DNR/DNI  - Dipo- Pending rehab placement

## 2018-08-02 NOTE — PROGRESS NOTE ADULT - SUBJECTIVE AND OBJECTIVE BOX
Dr. Eulalia Ward PGY-3, Pager [phone]      Patient is a 89y old  Female who presents with a chief complaint of Fall (28 Jul 2018 18:54)      INTERVAL HPI/OVERNIGHT EVENTS:        REVIEW OF SYSTEMS:  CONSTITUTIONAL: No fever, weight loss, or fatigue  EYES: No eye pain, visual disturbances, or discharge  ENMT:  No difficulty hearing, tinnitus, vertigo; No sinus or throat pain  NECK: No pain or stiffness  BREASTS: No pain, masses, or nipple discharge  RESPIRATORY: No cough, wheezing, chills or hemoptysis; No shortness of breath  CARDIOVASCULAR: No chest pain, palpitations, dizziness, or leg swelling  GASTROINTESTINAL: No abdominal or epigastric pain. No nausea, vomiting, or hematemesis; No diarrhea or constipation. No melena or hematochezia.  GENITOURINARY: No dysuria, frequency, hematuria, or incontinence  NEUROLOGICAL: No headaches, memory loss, loss of strength, numbness, or tremors  SKIN: No itching, burning, rashes, or lesions   LYMPH NODES: No enlarged glands  ENDOCRINE: No heat or cold intolerance; No hair loss  MUSCULOSKELETAL: No joint pain or swelling; No muscle, back, or extremity pain  PSYCHIATRIC: No depression, anxiety, mood swings, or difficulty sleeping  HEME/LYMPH: No easy bruising, or bleeding gums  ALLERY AND IMMUNOLOGIC: No hives or eczema    T(C): 36.6 (08-02-18 @ 06:05), Max: 36.7 (08-01-18 @ 13:10)  HR: 63 (08-02-18 @ 06:05) (56 - 63)  BP: 136/71 (08-02-18 @ 06:05) (130/78 - 136/71)  RR: 18 (08-02-18 @ 06:05) (18 - 18)  SpO2: 96% (08-02-18 @ 06:05) (95% - 97%)  Wt(kg): --Vital Signs Last 24 Hrs  T(C): 36.6 (02 Aug 2018 06:05), Max: 36.7 (01 Aug 2018 13:10)  T(F): 97.9 (02 Aug 2018 06:05), Max: 98.1 (01 Aug 2018 13:10)  HR: 63 (02 Aug 2018 06:05) (56 - 63)  BP: 136/71 (02 Aug 2018 06:05) (130/78 - 136/71)  BP(mean): --  RR: 18 (02 Aug 2018 06:05) (18 - 18)  SpO2: 96% (02 Aug 2018 06:05) (95% - 97%)    PHYSICAL EXAM:  GENERAL: NAD, well-groomed, well-developed  HEAD:  Atraumatic, Normocephalic  EYES: EOMI, PERRLA, conjunctiva and sclera clear  ENMT: No tonsillar erythema, exudates, or enlargement; Moist mucous membranes, Good dentition, No lesions  NECK: Supple, No JVD, Normal thyroid  NERVOUS SYSTEM:  Alert & Oriented X3, Good concentration; Motor Strength 5/5 B/L upper and lower extremities; DTRs 2+ intact and symmetric  CHEST/LUNG: Clear to percussion bilaterally; No rales, rhonchi, wheezing, or rubs  HEART: Regular rate and rhythm; No murmurs, rubs, or gallops  ABDOMEN: Soft, Nontender, Nondistended; Bowel sounds present  EXTREMITIES:  2+ Peripheral Pulses, No clubbing, cyanosis, or edema  LYMPH: No lymphadenopathy noted  SKIN: No rashes or lesions    Consultant(s) Notes Reviewed:  [x ] YES  [ ] NO  Care Discussed with Consultants/Other Providers [ x] YES  [ ] NO    LABS:                        11.1   7.00  )-----------( 230      ( 01 Aug 2018 06:25 )             35.1     08-01    141  |  105  |  34<H>  ----------------------------<  97  4.4   |  22  |  0.70    Ca    8.7      01 Aug 2018 06:25          CAPILLARY BLOOD GLUCOSE                RADIOLOGY & ADDITIONAL TESTS:    Imaging Personally Reviewed:  [ ] YES  [ ] NO Dr. Eulalia Ward PGY-3, Pager [phone]      Patient is a 89y old  Female who presents with a chief complaint of Fall (28 Jul 2018 18:54)      INTERVAL HPI/OVERNIGHT EVENTS: Overnight, patient was confused and agitated. She pulled out her hensley catheter with balloon likely still inflated. Per RN, patient passed trial of void shortly after and urinated a large volume. This morning, patient tired and aide at bedside states patient was up all night. No current complaints. Pain much better controlled.          REVIEW OF SYSTEMS:  CONSTITUTIONAL: No fevers or chills   RESPIRATORY: No cough, wheezing, chills or hemoptysis; No shortness of breath  CARDIOVASCULAR: No chest pain, palpitations, dizziness, or leg swelling  GASTROINTESTINAL: No abdominal or epigastric pain. No nausea, vomiting, or hematemesis; No diarrhea or constipation. No melena or hematochezia.  GENITOURINARY: No dysuria, frequency, hematuria, or incontinence  NEUROLOGICAL: No headaches, memory loss, loss of strength, numbness, or tremors      T(C): 36.6 (08-02-18 @ 06:05), Max: 36.7 (08-01-18 @ 13:10)  HR: 63 (08-02-18 @ 06:05) (56 - 63)  BP: 136/71 (08-02-18 @ 06:05) (130/78 - 136/71)  RR: 18 (08-02-18 @ 06:05) (18 - 18)  SpO2: 96% (08-02-18 @ 06:05) (95% - 97%)  Wt(kg): --Vital Signs Last 24 Hrs  T(C): 36.6 (02 Aug 2018 06:05), Max: 36.7 (01 Aug 2018 13:10)  T(F): 97.9 (02 Aug 2018 06:05), Max: 98.1 (01 Aug 2018 13:10)  HR: 63 (02 Aug 2018 06:05) (56 - 63)  BP: 136/71 (02 Aug 2018 06:05) (130/78 - 136/71)  BP(mean): --  RR: 18 (02 Aug 2018 06:05) (18 - 18)  SpO2: 96% (02 Aug 2018 06:05) (95% - 97%)    PHYSICAL EXAM:  GENERAL: Elderly, thin   EAD:  Atraumatic, Normocephalic  EYES: EOMI, PERRLA, conjunctiva and sclera clear  NECK: Supple, No JVD  CHEST/LUNG: Clear to auscultation bilaterally; No wheeze  HEART: Regular rate and rhythm; Systolic murmur, rubs, or gallops  ABDOMEN: Soft, Nontender, Nondistended; Bowel sounds present  : No bleeding around urethra where hensley was removed  EXTREMITIES:  2+ Peripheral Pulses, No clubbing, cyanosis, or edema  PSYCH: AAOx1      Consultant(s) Notes Reviewed:  [x ] YES  [ ] NO  Care Discussed with Consultants/Other Providers [ x] YES  [ ] NO    LABS:                        11.1   7.00  )-----------( 230      ( 01 Aug 2018 06:25 )             35.1     08-01    141  |  105  |  34<H>  ----------------------------<  97  4.4   |  22  |  0.70    Ca    8.7      01 Aug 2018 06:25          CAPILLARY BLOOD GLUCOSE                RADIOLOGY & ADDITIONAL TESTS:    Imaging Personally Reviewed:  [ ] YES  [ ] NO

## 2018-08-02 NOTE — PROGRESS NOTE ADULT - PROBLEM SELECTOR PLAN 1
- Minimally displaced right lateral 5th rib fracture 2/2 mechanical fall  - Limited ability to accomplish ADLs due to pain   - Pain control tramadol 25 mg. Consult acute pain.   - hold off on oxycodone due to urinary retention (s/p 2 straight caths)  - UA normal  - CXR w/ no evidence of pneumothorax.  - Incentive spirometry - Minimally displaced right lateral 5th rib fracture 2/2 mechanical fall  - Limited ability to accomplish ADLs due to pain   - Pain control tramadol 25 mg and tylenol around the clock, now well controlled and able to walk with physical therapy   - hold off on oxycodone due to urinary retention, which has now resolved  - UA normal  - CXR w/ no evidence of pneumothorax.  - Incentive spirometry

## 2018-08-02 NOTE — PROGRESS NOTE ADULT - PROBLEM SELECTOR PLAN 2
- Hx and mechanism not consistent with syncopal/vasovagal episode, rather more in line w/ mechanical fall. Fall not preceded w/ any CP, SOB, palpitations, HA or dizziness.   - EKG w/ no ST elevations, depressions or T wave inversions- intervals wnl. PPM eval in AM to assess for any events that may have preceded her fall  - CT Head negative for any ICH.  - fall precautions, PT consult Likely mechanical in nature  - EKG w/ no ST elevations, depressions or T wave inversions- intervals wnl. PPM eval normal with no events.   - CT Head negative for any ICH.  - fall precautions  - PT following, pending Rehab placement

## 2018-08-02 NOTE — PROGRESS NOTE ADULT - PROBLEM SELECTOR PLAN 4
Asymptomatic   - UA + LE and 50>WBC which could potentially have precipitated this event however pt afebrile, non toxic appearing, no suprapubic tenderness/dysuria, or leukocytosis at this time.   - Squamous epithelium in UA pointing towards dirty catch. Monitor off abx at this time. UCx negative  -repeat UA negative Asymptomatic   -repeat UA negative  - Will monitor off antibiotics at this time

## 2018-08-02 NOTE — PROGRESS NOTE ADULT - NSHPATTENDINGPLANDISCUSS_GEN_ALL_CORE
Housestaff
Housestaff and son
Patient's Son, HS4, SW/CM
Patient's Family, HS4, SW/CM
Patient, HS4, SW/CM
Patient, HS4, SW/CM, Son and Daughter-in-Law at Bedside

## 2018-08-02 NOTE — PROGRESS NOTE ADULT - ASSESSMENT
89 y/o F w/hypothyroidism, PPM, HFpEF (mild diastolic dysfunction 2016), dementia (AAOx2 at baseline), macular degeneration, hemorrhagic CVA, recurrent UTI, b/l DVT (off AC since 2014 following CVA) admitted s/p mechanical fall c/b Rib fracture found to have asymptomatic pyuria course now c/b urinary retention at this time. 89 y/o F w/hypothyroidism, PPM, HFpEF (mild diastolic dysfunction 2016), dementia (AAOx2 at baseline), macular degeneration, hemorrhagic CVA, recurrent UTI, b/l DVT (off AC since 2014 following CVA) admitted s/p mechanical fall c/b Rib fracture found to have asymptomatic pyuria course now c/b urinary retention s/p hensley catheter, now resolved.

## 2018-08-02 NOTE — PROGRESS NOTE ADULT - PROBLEM SELECTOR PLAN 7
Hx of CVA  - c/w ASA 81 and Atorvastatin 10mg QD

## 2018-08-02 NOTE — PROGRESS NOTE ADULT - PROBLEM SELECTOR PLAN 6
- c/w Synthroid 112mcg PO QD  - Thyroid Stimulating Hormone, Serum: 1.21 uIU/mL
- c/w Synthroid 112mcg PO QD  - check TSH with AM labs

## 2018-08-02 NOTE — PROVIDER CONTACT NOTE (OTHER) - ASSESSMENT
Site was assessed for trauma or bleeding. No signs of bleeding noted or reports of pain or discomfort.

## 2018-08-02 NOTE — PROGRESS NOTE ADULT - PROBLEM SELECTOR PROBLEM 5
Heart failure with preserved ejection fraction

## 2018-08-02 NOTE — PROGRESS NOTE ADULT - PROBLEM SELECTOR PROBLEM 2
Fall, initial encounter

## 2018-08-23 ENCOUNTER — APPOINTMENT (OUTPATIENT)
Dept: CARDIOLOGY | Facility: CLINIC | Age: 83
End: 2018-08-23

## 2018-10-29 ENCOUNTER — APPOINTMENT (OUTPATIENT)
Dept: DERMATOLOGY | Facility: CLINIC | Age: 83
End: 2018-10-29

## 2018-11-07 ENCOUNTER — APPOINTMENT (OUTPATIENT)
Dept: CARDIOLOGY | Facility: CLINIC | Age: 83
End: 2018-11-07
Payer: MEDICARE

## 2018-11-07 VITALS
RESPIRATION RATE: 12 BRPM | HEART RATE: 55 BPM | TEMPERATURE: 97.8 F | HEIGHT: 65 IN | DIASTOLIC BLOOD PRESSURE: 91 MMHG | SYSTOLIC BLOOD PRESSURE: 153 MMHG | OXYGEN SATURATION: 94 %

## 2018-11-07 DIAGNOSIS — I49.5 SICK SINUS SYNDROME: ICD-10-CM

## 2018-11-07 PROCEDURE — 93280 PM DEVICE PROGR EVAL DUAL: CPT

## 2018-11-07 PROCEDURE — 99214 OFFICE O/P EST MOD 30 MIN: CPT | Mod: 25

## 2018-11-07 RX ORDER — ESCITALOPRAM OXALATE 5 MG/1
5 TABLET ORAL
Qty: 30 | Refills: 1 | Status: ACTIVE | COMMUNITY
Start: 2018-11-07

## 2018-11-07 NOTE — DISCUSSION/SUMMARY
[___ Month(s)] : [unfilled] month(s) [FreeTextEntry1] : The patient is a 89-year-old female history of Alzheimer's dementia, chronic DVTs bilaterally, IVC filter, stroke secondary to cerebral hemorrhage, tachybradycardia syndrome,[employer] permanent pacemaker  diastolic heart failure  s/p sacral fracture with dizziness. \par #1 PAF - [employer] PPM interrogated, no events, increase PVARP, good sensing and pacing thresholds and 9 year battery life, PPM dependent. Remote monitoring scheduled next read. No anticoagulation secondary to cerebral hemorrhage and multiple falls\par #2 PAD - moderate disease, encourage ambulation; no recurrent DVT, IVC filter in place\par #3 AS - mild on ECHO 5/16 but sounds more severe\par #4 Neuro -prior stroke, no recurrence; dementia stable\par #5 General - increase hydration for dizziness

## 2018-11-07 NOTE — REVIEW OF SYSTEMS
[Negative] : Heme/Lymph [Shortness Of Breath] : no shortness of breath [Dyspnea on exertion] : not dyspnea during exertion [Chest Pain] : no chest pain [Lower Ext Edema] : no extremity edema [Palpitations] : no palpitations

## 2018-11-07 NOTE — REASON FOR VISIT
[Follow-Up - Clinic] : a clinic follow-up of [Atrial Fibrillation] : atrial fibrillation [Pacemaker Evaluation] : pacemaker ~T evaluation ~C was performed

## 2018-11-07 NOTE — PHYSICAL EXAM
[General Appearance - Well Developed] : well developed [Normal Appearance] : normal appearance [Well Groomed] : well groomed [General Appearance - Well Nourished] : well nourished [No Deformities] : no deformities [General Appearance - In No Acute Distress] : no acute distress [Normal Conjunctiva] : the conjunctiva exhibited no abnormalities [Eyelids - No Xanthelasma] : the eyelids demonstrated no xanthelasmas [Normal Oral Mucosa] : normal oral mucosa [No Oral Pallor] : no oral pallor [No Oral Cyanosis] : no oral cyanosis [Normal Jugular Venous A Waves Present] : normal jugular venous A waves present [Normal Jugular Venous V Waves Present] : normal jugular venous V waves present [No Jugular Venous Araya A Waves] : no jugular venous araya A waves [Respiration, Rhythm And Depth] : normal respiratory rhythm and effort [Exaggerated Use Of Accessory Muscles For Inspiration] : no accessory muscle use [Auscultation Breath Sounds / Voice Sounds] : lungs were clear to auscultation bilaterally [Heart Rate And Rhythm] : heart rate and rhythm were normal [Heart Sounds] : normal S1 and S2 [Systolic grade ___/6] : A grade [unfilled]/6 systolic murmur was heard. [Abdomen Soft] : soft [Abdomen Tenderness] : non-tender [Abdomen Mass (___ Cm)] : no abdominal mass palpated [Abnormal Walk] : normal gait [Gait - Sufficient For Exercise Testing] : the gait was sufficient for exercise testing [Nail Clubbing] : no clubbing of the fingernails [Cyanosis, Localized] : no localized cyanosis [Petechial Hemorrhages (___cm)] : no petechial hemorrhages [Skin Color & Pigmentation] : normal skin color and pigmentation [] : no rash [No Venous Stasis] : no venous stasis [Skin Lesions] : no skin lesions [No Skin Ulcers] : no skin ulcer [No Xanthoma] : no  xanthoma was observed [Oriented To Time, Place, And Person] : oriented to person, place, and time [Affect] : the affect was normal [Mood] : the mood was normal [No Anxiety] : not feeling anxious

## 2018-11-23 ENCOUNTER — CLINICAL ADVICE (OUTPATIENT)
Age: 83
End: 2018-11-23

## 2018-12-19 ENCOUNTER — APPOINTMENT (OUTPATIENT)
Dept: CARDIOLOGY | Facility: CLINIC | Age: 83
End: 2018-12-19
Payer: MEDICARE

## 2018-12-19 ENCOUNTER — LABORATORY RESULT (OUTPATIENT)
Age: 83
End: 2018-12-19

## 2018-12-19 ENCOUNTER — NON-APPOINTMENT (OUTPATIENT)
Age: 83
End: 2018-12-19

## 2018-12-19 VITALS
RESPIRATION RATE: 12 BRPM | SYSTOLIC BLOOD PRESSURE: 110 MMHG | HEIGHT: 65 IN | DIASTOLIC BLOOD PRESSURE: 74 MMHG | OXYGEN SATURATION: 98 % | HEART RATE: 60 BPM | TEMPERATURE: 98.2 F

## 2018-12-19 DIAGNOSIS — E03.9 HYPOTHYROIDISM, UNSPECIFIED: ICD-10-CM

## 2018-12-19 DIAGNOSIS — I10 ESSENTIAL (PRIMARY) HYPERTENSION: ICD-10-CM

## 2018-12-19 DIAGNOSIS — E78.5 HYPERLIPIDEMIA, UNSPECIFIED: ICD-10-CM

## 2018-12-19 DIAGNOSIS — I50.32 CHRONIC DIASTOLIC (CONGESTIVE) HEART FAILURE: ICD-10-CM

## 2018-12-19 DIAGNOSIS — I35.0 NONRHEUMATIC AORTIC (VALVE) STENOSIS: ICD-10-CM

## 2018-12-19 PROCEDURE — 99215 OFFICE O/P EST HI 40 MIN: CPT | Mod: 25

## 2018-12-19 NOTE — HISTORY OF PRESENT ILLNESS
[FreeTextEntry1] : Bia was wheezing and we increased her diuretics. She definitely improved but now occasional wheeze. Also levothyroxine was decreased and family notices she is much weaker and unsteady. She sleeps more now. She denies any complaints of chest pain, palpitations or shortness of breath. \par \par PMH: She had a s/p sacral fracture after a fall in January 2017 requiring hospitalization and rehab.\par Since last visit another fall with femur fracture requiring hospitalization and rehab.

## 2018-12-19 NOTE — DISCUSSION/SUMMARY
[___ Month(s)] : [unfilled] month(s) [FreeTextEntry1] : The patient is a 89-year-old female history of Alzheimer's dementia, chronic DVTs bilaterally, IVC filter, stroke secondary to cerebral hemorrhage, tachybradycardia syndrome,[employer] permanent pacemaker  diastolic heart failure  s/p sacral fracture with wheezing and lethargy. \par #1 PAF - [employer] PPM interrogated, no events, increase PVARP, good sensing and pacing thresholds and 9 year battery life, PPM dependent. Remote monitoring scheduled next read. No anticoagulation secondary to cerebral hemorrhage and multiple falls\par #2 PAD - moderate disease, encourage ambulation; no recurrent DVT, IVC filter in place\par #3 AS - mild on ECHO 5/16 but sounds more severe, wheezing was heart failure exacerbation and responded to diuretics, labs today\par #4 Neuro -prior stroke, no recurrence; dementia stable\par #5 General - check TSH/T4 in view of fatigue and recent decrease in dose

## 2018-12-20 LAB
ANION GAP SERPL CALC-SCNC: 12 MMOL/L
BUN SERPL-MCNC: 24 MG/DL
CALCIUM SERPL-MCNC: 9.1 MG/DL
CHLORIDE SERPL-SCNC: 103 MMOL/L
CO2 SERPL-SCNC: 22 MMOL/L
CREAT SERPL-MCNC: 1.21 MG/DL
GLUCOSE SERPL-MCNC: 56 MG/DL
NT-PROBNP SERPL-MCNC: 416 PG/ML
POTASSIUM SERPL-SCNC: 4.5 MMOL/L
SODIUM SERPL-SCNC: 138 MMOL/L
TSH SERPL-ACNC: 0.06 UIU/ML

## 2018-12-21 ENCOUNTER — MEDICATION RENEWAL (OUTPATIENT)
Age: 83
End: 2018-12-21

## 2018-12-21 RX ORDER — ALBUTEROL SULFATE 90 UG/1
108 (90 BASE) AEROSOL, METERED RESPIRATORY (INHALATION) EVERY 6 HOURS
Qty: 1 | Refills: 3 | Status: ACTIVE | COMMUNITY
Start: 2018-12-21 | End: 1900-01-01

## 2018-12-24 ENCOUNTER — INPATIENT (INPATIENT)
Facility: HOSPITAL | Age: 83
LOS: 3 days | Discharge: NURSING, REHAB OR LONG-TERM CARE | End: 2018-12-28
Attending: HOSPITALIST | Admitting: HOSPITALIST
Payer: COMMERCIAL

## 2018-12-24 VITALS
SYSTOLIC BLOOD PRESSURE: 115 MMHG | RESPIRATION RATE: 16 BRPM | OXYGEN SATURATION: 95 % | TEMPERATURE: 98 F | HEART RATE: 68 BPM | DIASTOLIC BLOOD PRESSURE: 68 MMHG

## 2018-12-24 DIAGNOSIS — I49.5 SICK SINUS SYNDROME: ICD-10-CM

## 2018-12-24 DIAGNOSIS — K59.09 OTHER CONSTIPATION: ICD-10-CM

## 2018-12-24 DIAGNOSIS — Z29.9 ENCOUNTER FOR PROPHYLACTIC MEASURES, UNSPECIFIED: ICD-10-CM

## 2018-12-24 DIAGNOSIS — F03.90 UNSPECIFIED DEMENTIA WITHOUT BEHAVIORAL DISTURBANCE: ICD-10-CM

## 2018-12-24 DIAGNOSIS — Z95.828 PRESENCE OF OTHER VASCULAR IMPLANTS AND GRAFTS: Chronic | ICD-10-CM

## 2018-12-24 DIAGNOSIS — I50.30 UNSPECIFIED DIASTOLIC (CONGESTIVE) HEART FAILURE: ICD-10-CM

## 2018-12-24 DIAGNOSIS — E03.9 HYPOTHYROIDISM, UNSPECIFIED: ICD-10-CM

## 2018-12-24 DIAGNOSIS — N39.0 URINARY TRACT INFECTION, SITE NOT SPECIFIED: ICD-10-CM

## 2018-12-24 DIAGNOSIS — I63.9 CEREBRAL INFARCTION, UNSPECIFIED: ICD-10-CM

## 2018-12-24 LAB
ALBUMIN SERPL ELPH-MCNC: 3.1 G/DL — LOW (ref 3.3–5)
ALP SERPL-CCNC: 84 U/L — SIGNIFICANT CHANGE UP (ref 40–120)
ALT FLD-CCNC: 10 U/L — SIGNIFICANT CHANGE UP (ref 4–33)
APPEARANCE UR: SIGNIFICANT CHANGE UP
APTT BLD: 28.6 SEC — SIGNIFICANT CHANGE UP (ref 27.5–36.3)
AST SERPL-CCNC: 13 U/L — SIGNIFICANT CHANGE UP (ref 4–32)
BACTERIA # UR AUTO: SIGNIFICANT CHANGE UP
BASE EXCESS BLDV CALC-SCNC: 3.4 MMOL/L — SIGNIFICANT CHANGE UP
BASOPHILS # BLD AUTO: 0.04 K/UL — SIGNIFICANT CHANGE UP (ref 0–0.2)
BASOPHILS NFR BLD AUTO: 0.4 % — SIGNIFICANT CHANGE UP (ref 0–2)
BILIRUB SERPL-MCNC: 0.3 MG/DL — SIGNIFICANT CHANGE UP (ref 0.2–1.2)
BILIRUB UR-MCNC: NEGATIVE — SIGNIFICANT CHANGE UP
BLOOD GAS VENOUS - CREATININE: 0.68 MG/DL — SIGNIFICANT CHANGE UP (ref 0.5–1.3)
BLOOD UR QL VISUAL: NEGATIVE — SIGNIFICANT CHANGE UP
BUN SERPL-MCNC: 27 MG/DL — HIGH (ref 7–23)
CALCIUM SERPL-MCNC: 8.6 MG/DL — SIGNIFICANT CHANGE UP (ref 8.4–10.5)
CHLORIDE BLDV-SCNC: 108 MMOL/L — SIGNIFICANT CHANGE UP (ref 96–108)
CHLORIDE SERPL-SCNC: 104 MMOL/L — SIGNIFICANT CHANGE UP (ref 98–107)
CO2 SERPL-SCNC: 23 MMOL/L — SIGNIFICANT CHANGE UP (ref 22–31)
COLOR SPEC: YELLOW — SIGNIFICANT CHANGE UP
CREAT SERPL-MCNC: 0.79 MG/DL — SIGNIFICANT CHANGE UP (ref 0.5–1.3)
EOSINOPHIL # BLD AUTO: 0.27 K/UL — SIGNIFICANT CHANGE UP (ref 0–0.5)
EOSINOPHIL NFR BLD AUTO: 2.5 % — SIGNIFICANT CHANGE UP (ref 0–6)
GAS PNL BLDV: 136 MMOL/L — SIGNIFICANT CHANGE UP (ref 136–146)
GLUCOSE BLDV-MCNC: 92 — SIGNIFICANT CHANGE UP (ref 70–99)
GLUCOSE SERPL-MCNC: 88 MG/DL — SIGNIFICANT CHANGE UP (ref 70–99)
GLUCOSE UR-MCNC: NEGATIVE — SIGNIFICANT CHANGE UP
HCO3 BLDV-SCNC: 26 MMOL/L — SIGNIFICANT CHANGE UP (ref 20–27)
HCT VFR BLD CALC: 36.3 % — SIGNIFICANT CHANGE UP (ref 34.5–45)
HCT VFR BLDV CALC: 34.6 % — SIGNIFICANT CHANGE UP (ref 34.5–45)
HGB BLD-MCNC: 11.3 G/DL — LOW (ref 11.5–15.5)
HGB BLDV-MCNC: 11.2 G/DL — LOW (ref 11.5–15.5)
HYALINE CASTS # UR AUTO: NEGATIVE — SIGNIFICANT CHANGE UP
IMM GRANULOCYTES # BLD AUTO: 0.09 # — SIGNIFICANT CHANGE UP
IMM GRANULOCYTES NFR BLD AUTO: 0.8 % — SIGNIFICANT CHANGE UP (ref 0–1.5)
INR BLD: 1.07 — SIGNIFICANT CHANGE UP (ref 0.88–1.17)
KETONES UR-MCNC: NEGATIVE — SIGNIFICANT CHANGE UP
LACTATE BLDV-MCNC: 1 MMOL/L — SIGNIFICANT CHANGE UP (ref 0.5–2)
LEUKOCYTE ESTERASE UR-ACNC: SIGNIFICANT CHANGE UP
LYMPHOCYTES # BLD AUTO: 1.51 K/UL — SIGNIFICANT CHANGE UP (ref 1–3.3)
LYMPHOCYTES # BLD AUTO: 14.2 % — SIGNIFICANT CHANGE UP (ref 13–44)
MCHC RBC-ENTMCNC: 31.1 % — LOW (ref 32–36)
MCHC RBC-ENTMCNC: 32.5 PG — SIGNIFICANT CHANGE UP (ref 27–34)
MCV RBC AUTO: 104.3 FL — HIGH (ref 80–100)
MONOCYTES # BLD AUTO: 1.23 K/UL — HIGH (ref 0–0.9)
MONOCYTES NFR BLD AUTO: 11.6 % — SIGNIFICANT CHANGE UP (ref 2–14)
NEUTROPHILS # BLD AUTO: 7.5 K/UL — HIGH (ref 1.8–7.4)
NEUTROPHILS NFR BLD AUTO: 70.5 % — SIGNIFICANT CHANGE UP (ref 43–77)
NITRITE UR-MCNC: POSITIVE — HIGH
NRBC # FLD: 0 — SIGNIFICANT CHANGE UP
NT-PROBNP SERPL-SCNC: 711.9 PG/ML — SIGNIFICANT CHANGE UP
PCO2 BLDV: 50 MMHG — SIGNIFICANT CHANGE UP (ref 41–51)
PH BLDV: 7.37 PH — SIGNIFICANT CHANGE UP (ref 7.32–7.43)
PH UR: 6 — SIGNIFICANT CHANGE UP (ref 5–8)
PLATELET # BLD AUTO: 305 K/UL — SIGNIFICANT CHANGE UP (ref 150–400)
PMV BLD: 10.1 FL — SIGNIFICANT CHANGE UP (ref 7–13)
PO2 BLDV: 22 MMHG — LOW (ref 35–40)
POTASSIUM BLDV-SCNC: 4.3 MMOL/L — SIGNIFICANT CHANGE UP (ref 3.4–4.5)
POTASSIUM SERPL-MCNC: 4.6 MMOL/L — SIGNIFICANT CHANGE UP (ref 3.5–5.3)
POTASSIUM SERPL-SCNC: 4.6 MMOL/L — SIGNIFICANT CHANGE UP (ref 3.5–5.3)
PROT SERPL-MCNC: 6.8 G/DL — SIGNIFICANT CHANGE UP (ref 6–8.3)
PROT UR-MCNC: 20 — SIGNIFICANT CHANGE UP
PROTHROM AB SERPL-ACNC: 12.2 SEC — SIGNIFICANT CHANGE UP (ref 9.8–13.1)
RBC # BLD: 3.48 M/UL — LOW (ref 3.8–5.2)
RBC # FLD: 13.5 % — SIGNIFICANT CHANGE UP (ref 10.3–14.5)
RBC CASTS # UR COMP ASSIST: HIGH (ref 0–?)
SAO2 % BLDV: 30.3 % — LOW (ref 60–85)
SODIUM SERPL-SCNC: 140 MMOL/L — SIGNIFICANT CHANGE UP (ref 135–145)
SP GR SPEC: 1.02 — SIGNIFICANT CHANGE UP (ref 1–1.04)
SQUAMOUS # UR AUTO: SIGNIFICANT CHANGE UP
TROPONIN T, HIGH SENSITIVITY: 17 NG/L — SIGNIFICANT CHANGE UP (ref ?–14)
UROBILINOGEN FLD QL: NORMAL — SIGNIFICANT CHANGE UP
WBC # BLD: 10.64 K/UL — HIGH (ref 3.8–10.5)
WBC # FLD AUTO: 10.64 K/UL — HIGH (ref 3.8–10.5)
WBC UR QL: >50 — HIGH (ref 0–?)

## 2018-12-24 PROCEDURE — 71046 X-RAY EXAM CHEST 2 VIEWS: CPT | Mod: 26

## 2018-12-24 PROCEDURE — 99223 1ST HOSP IP/OBS HIGH 75: CPT | Mod: GC

## 2018-12-24 RX ORDER — AMIODARONE HYDROCHLORIDE 400 MG/1
100 TABLET ORAL DAILY
Qty: 0 | Refills: 0 | Status: DISCONTINUED | OUTPATIENT
Start: 2018-12-24 | End: 2018-12-28

## 2018-12-24 RX ORDER — LEVOTHYROXINE SODIUM 125 MCG
100 TABLET ORAL DAILY
Qty: 0 | Refills: 0 | Status: DISCONTINUED | OUTPATIENT
Start: 2018-12-24 | End: 2018-12-28

## 2018-12-24 RX ORDER — SENNA PLUS 8.6 MG/1
2 TABLET ORAL AT BEDTIME
Qty: 0 | Refills: 0 | Status: DISCONTINUED | OUTPATIENT
Start: 2018-12-24 | End: 2018-12-27

## 2018-12-24 RX ORDER — ATORVASTATIN CALCIUM 80 MG/1
10 TABLET, FILM COATED ORAL AT BEDTIME
Qty: 0 | Refills: 0 | Status: DISCONTINUED | OUTPATIENT
Start: 2018-12-24 | End: 2018-12-28

## 2018-12-24 RX ORDER — FUROSEMIDE 40 MG
20 TABLET ORAL
Qty: 0 | Refills: 0 | Status: DISCONTINUED | OUTPATIENT
Start: 2018-12-24 | End: 2018-12-28

## 2018-12-24 RX ORDER — SPIRONOLACTONE 25 MG/1
12.5 TABLET, FILM COATED ORAL
Qty: 0 | Refills: 0 | Status: DISCONTINUED | OUTPATIENT
Start: 2018-12-24 | End: 2018-12-28

## 2018-12-24 RX ORDER — CEFTRIAXONE 500 MG/1
1 INJECTION, POWDER, FOR SOLUTION INTRAMUSCULAR; INTRAVENOUS ONCE
Qty: 0 | Refills: 0 | Status: COMPLETED | OUTPATIENT
Start: 2018-12-24 | End: 2018-12-24

## 2018-12-24 RX ORDER — ENOXAPARIN SODIUM 100 MG/ML
40 INJECTION SUBCUTANEOUS EVERY 24 HOURS
Qty: 0 | Refills: 0 | Status: DISCONTINUED | OUTPATIENT
Start: 2018-12-24 | End: 2018-12-28

## 2018-12-24 RX ORDER — LINACLOTIDE 145 UG/1
145 CAPSULE, GELATIN COATED ORAL
Qty: 0 | Refills: 0 | Status: DISCONTINUED | OUTPATIENT
Start: 2018-12-24 | End: 2018-12-27

## 2018-12-24 RX ORDER — SODIUM CHLORIDE 9 MG/ML
500 INJECTION INTRAMUSCULAR; INTRAVENOUS; SUBCUTANEOUS
Qty: 0 | Refills: 0 | Status: DISCONTINUED | OUTPATIENT
Start: 2018-12-24 | End: 2018-12-25

## 2018-12-24 RX ORDER — PREGABALIN 225 MG/1
1000 CAPSULE ORAL DAILY
Qty: 0 | Refills: 0 | Status: DISCONTINUED | OUTPATIENT
Start: 2018-12-24 | End: 2018-12-28

## 2018-12-24 RX ORDER — ASPIRIN/CALCIUM CARB/MAGNESIUM 324 MG
81 TABLET ORAL DAILY
Qty: 0 | Refills: 0 | Status: DISCONTINUED | OUTPATIENT
Start: 2018-12-24 | End: 2018-12-28

## 2018-12-24 RX ORDER — CEFTRIAXONE 500 MG/1
1 INJECTION, POWDER, FOR SOLUTION INTRAMUSCULAR; INTRAVENOUS EVERY 24 HOURS
Qty: 0 | Refills: 0 | Status: COMPLETED | OUTPATIENT
Start: 2018-12-25 | End: 2018-12-26

## 2018-12-24 RX ORDER — DOCUSATE SODIUM 100 MG
100 CAPSULE ORAL THREE TIMES A DAY
Qty: 0 | Refills: 0 | Status: DISCONTINUED | OUTPATIENT
Start: 2018-12-24 | End: 2018-12-27

## 2018-12-24 RX ORDER — HYDROCORTISONE 1 %
1 OINTMENT (GRAM) TOPICAL
Qty: 0 | Refills: 0 | COMMUNITY

## 2018-12-24 RX ORDER — QUETIAPINE FUMARATE 200 MG/1
25 TABLET, FILM COATED ORAL AT BEDTIME
Qty: 0 | Refills: 0 | Status: DISCONTINUED | OUTPATIENT
Start: 2018-12-24 | End: 2018-12-26

## 2018-12-24 RX ORDER — POLYETHYLENE GLYCOL 3350 17 G/17G
17 POWDER, FOR SOLUTION ORAL DAILY
Qty: 0 | Refills: 0 | Status: DISCONTINUED | OUTPATIENT
Start: 2018-12-24 | End: 2018-12-27

## 2018-12-24 RX ADMIN — ENOXAPARIN SODIUM 40 MILLIGRAM(S): 100 INJECTION SUBCUTANEOUS at 21:52

## 2018-12-24 RX ADMIN — CEFTRIAXONE 100 GRAM(S): 500 INJECTION, POWDER, FOR SOLUTION INTRAMUSCULAR; INTRAVENOUS at 14:12

## 2018-12-24 RX ADMIN — ATORVASTATIN CALCIUM 10 MILLIGRAM(S): 80 TABLET, FILM COATED ORAL at 21:52

## 2018-12-24 RX ADMIN — SENNA PLUS 2 TABLET(S): 8.6 TABLET ORAL at 21:52

## 2018-12-24 RX ADMIN — Medication 100 MILLIGRAM(S): at 21:52

## 2018-12-24 RX ADMIN — QUETIAPINE FUMARATE 25 MILLIGRAM(S): 200 TABLET, FILM COATED ORAL at 21:57

## 2018-12-24 RX ADMIN — SODIUM CHLORIDE 50 MILLILITER(S): 9 INJECTION INTRAMUSCULAR; INTRAVENOUS; SUBCUTANEOUS at 19:52

## 2018-12-24 NOTE — ED PROVIDER NOTE - MEDICAL DECISION MAKING DETAILS
89F presents with weakness and dyspnea.  R/o infectious etiology with UA/CXR.  ACS workup including troponin, EKG and BNP.

## 2018-12-24 NOTE — H&P ADULT - ATTENDING COMMENTS
Patient was seen and examined personally by me. I have discussed the plan and reviewed the Resident's note and agree with the above physical exam findings including assessment and plan except as indicated below. Labs and imagining reviewed.     89F with hypothyroid, s/p PPM, HFpEF, dementia presented with metabolic encephalopathy in setting of UTI and dehydration. trial gentle IV hydration. IV ceftriaxone for UTI. monitor respiratory status with IVF.

## 2018-12-24 NOTE — H&P ADULT - PROBLEM SELECTOR PLAN 1
UA w/ large LE and +nitrites. Patient does have a h/o frequent UTI's and is on methenamine at home.   - s/p 1g ceftriaxone in ED  - would c/w ceftriaxone for another 2 days  - f/u urine cx

## 2018-12-24 NOTE — ED PROVIDER NOTE - OBJECTIVE STATEMENT
89F PMH dementia, CHF, and recurrent UTIs presents with weakness and shortness of breath.  Patient reports she feels ok but son and aide at bedside reports patient has been complaining of weakness over the past 3 days.  Patient also reported shortness of breath when laying flat on her back according to the aide.  Son also reports patient has had decreased oral intake.  Patient denies fevers, chills, cough, abdominal pain, diarrhea, constipation, hematochezia, melena.

## 2018-12-24 NOTE — H&P ADULT - NSHPPHYSICALEXAM_GEN_ALL_CORE
Vital Signs Last 24 Hrs  T(C): 36.6 (24 Dec 2018 16:46), Max: 36.8 (24 Dec 2018 12:01)  T(F): 97.8 (24 Dec 2018 16:46), Max: 98.2 (24 Dec 2018 12:01)  HR: 61 (24 Dec 2018 16:46) (61 - 68)  BP: 135/65 (24 Dec 2018 16:46) (115/68 - 135/65)  BP(mean): --  RR: 18 (24 Dec 2018 16:46) (16 - 18)  SpO2: 100% (24 Dec 2018 16:46) (95% - 100%)    PHYSICAL EXAM:   GENERAL: no acute distress  HEENT: NC/AT, EOMI, neck supple, MMM  RESPIRATORY: R>L basilar rales though only intermittent  CARDIOVASCULAR: RRR, no murmurs, gallops, rubs  ABDOMINAL: soft, non-tender, non-distended, positive bowel sounds   EXTREMITIES: no clubbing, cyanosis, or edema  NEUROLOGICAL: alert and oriented x 2 (self, place), non-focal  SKIN: no rashes or lesions   MUSCULOSKELETAL: no gross joint deformity  PSYCH: appropriate affect, linear thinking

## 2018-12-24 NOTE — ED PROVIDER NOTE - CHIEF COMPLAINT
The patient is a 89y Female complaining of The patient is a 89y Female complaining of weakness and shortness of breath

## 2018-12-24 NOTE — H&P ADULT - ASSESSMENT
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

## 2018-12-24 NOTE — H&P ADULT - PROBLEM SELECTOR PLAN 8
DVT ppx: Lovenox  Diet: dysphagia w/ thin liquids  Code Status: DNR/DNI, paper and MOLST in chart    Mayelin Golden MD  PGY-2, Internal Medicine  Pager #[phone]

## 2018-12-24 NOTE — H&P ADULT - PROBLEM SELECTOR PLAN 2
Patient is baseline AOx2, and she is currently at baseline.  - c/w home dose of Seroquel 25mg daily, 0.25 Ativan mg daily

## 2018-12-24 NOTE — H&P ADULT - NSHPREVIEWOFSYSTEMS_GEN_ALL_CORE
CONSTITUTIONAL: +weakness; no fevers or chills  EYES/ENT: No visual changes, no throat pain   NECK: no neck pain  RESPIRATORY: No cough, wheezing, hemoptysis; No shortness of breath  CARDIOVASCULAR: No chest pain or palpitations  GASTROINTESTINAL: No abdominal pain, nausea, vomiting, or hematemesis; No diarrhea or constipation. No melena or hematochezia.  GENITOURINARY: +foul smelling urine, decreased UOP  MUSCULOSKELETAL: no joint pain, muscle soreness  NEUROLOGICAL: No dizziness, numbness, or weakness  SKIN: No itching, burning, rashes, or lesions   All other review of systems is negative unless indicated above.

## 2018-12-24 NOTE — H&P ADULT - HISTORY OF PRESENT ILLNESS
This is a 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 Patient has been in her usual state of health until 2 days prior to admission, when the son began to notice that she was slightly more confused than usual, and appeared to be experiencing some dyspnea when lying on her back. She saw Dr. Souza, who increased her diuretic dose, but this did not change her decreased PO intake and weakness. Patient denies CP, current dyspnea, fevers/chills, abdominal pain, n/v/d/c.     In the ED, initial vitals were: temp 98.2F, HR 68, /68, RR 16, saturating 95% on RA. Labs and imaging remarkable for: UA w/ large LE and +nitrites, WBC 10.64K, and CXR was unremarkable. Patient received 1g ceftriaxone.

## 2018-12-24 NOTE — ED ADULT TRIAGE NOTE - CHIEF COMPLAINT QUOTE
c.o increased weakness x 1 week with dizziness. as per family, pt also c/o dark foul smelling urine. pmhx chf, uti, cva, pacemaer (fs 179 as per ems)

## 2018-12-24 NOTE — ED PROVIDER NOTE - ATTENDING CONTRIBUTION TO CARE
Attending note:   After face to face evaluation of this patient, I concur with above noted hx, pe, and care plan for this patient.  89 y/o F with decreasing po intake and decreasing urine output.  Patient alert, with no complaints.  Basilar rales present; vss.   evaluation in progress

## 2018-12-24 NOTE — H&P ADULT - PROBLEM SELECTOR PLAN 4
Last TTE 5/2016 w/ EF 75-80% and Stage 1 Diastolic dysfunction  - Euvolemic on exam today. No evidence of JVD or b/l LE edema  - c/w home Amiodarone 100mg PO QD, Furosemide 20mg PO and Spironolactone 12.5mg PO every Mon, Thurs, Sun per home regimen  - Monitor i's/o's. Daily weights

## 2018-12-24 NOTE — H&P ADULT - NSHPSOCIALHISTORY_GEN_ALL_CORE
Patient lives at home in 2 family home w/ son and daughter-in-law living upstairs. Has 24/7 HHA. Remote h/o tobacco use. Denies EtOH and illicit drug use. Require assistance for most ADL's.

## 2018-12-24 NOTE — H&P ADULT - NSHPLABSRESULTS_GEN_ALL_CORE
11.3   10.64 )-----------( 305      ( 24 Dec 2018 12:50 )             36.3     12-24    140  |  104  |  27<H>  ----------------------------<  88  4.6   |  23  |  0.79    Ca    8.6      24 Dec 2018 12:50    TPro  6.8  /  Alb  3.1<L>  /  TBili  0.3  /  DBili  x   /  AST  13  /  ALT  10  /  AlkPhos  84  12-24    PT/INR - ( 24 Dec 2018 12:21 )   PT: 12.2 SEC;   INR: 1.07       PTT - ( 24 Dec 2018 12:21 )  PTT:28.6 SEC    < from: Xray Chest 2 Views PA/Lat (12.24.18 @ 13:32) >    IMPRESSION:  Underinflated lungs with resultant crowding and accentuation of   parenchymal markings limiting evaluation for mild interstitial edema.    No focal patchy airspace consolidations, pleural effusions, or   pneumothorax.    Stable left chest wall dual-lead pacemaker and cardiac and mediastinal   silhouettes.    Generalized osteopenia and spinal dextrocurvature with degenerative   change again noted.    < end of copied text >    All labs, imaging, and EKG personally reviewed by me.

## 2018-12-25 DIAGNOSIS — D51.9 VITAMIN B12 DEFICIENCY ANEMIA, UNSPECIFIED: ICD-10-CM

## 2018-12-25 DIAGNOSIS — D53.9 NUTRITIONAL ANEMIA, UNSPECIFIED: ICD-10-CM

## 2018-12-25 PROCEDURE — 99232 SBSQ HOSP IP/OBS MODERATE 35: CPT | Mod: GC

## 2018-12-25 RX ORDER — NYSTATIN CREAM 100000 [USP'U]/G
1 CREAM TOPICAL
Qty: 0 | Refills: 0 | Status: DISCONTINUED | OUTPATIENT
Start: 2018-12-25 | End: 2018-12-28

## 2018-12-25 RX ADMIN — Medication 1 TABLET(S): at 14:03

## 2018-12-25 RX ADMIN — AMIODARONE HYDROCHLORIDE 100 MILLIGRAM(S): 400 TABLET ORAL at 06:11

## 2018-12-25 RX ADMIN — Medication 0.25 MILLIGRAM(S): at 14:04

## 2018-12-25 RX ADMIN — NYSTATIN CREAM 1 APPLICATION(S): 100000 CREAM TOPICAL at 17:49

## 2018-12-25 RX ADMIN — ATORVASTATIN CALCIUM 10 MILLIGRAM(S): 80 TABLET, FILM COATED ORAL at 21:18

## 2018-12-25 RX ADMIN — SENNA PLUS 2 TABLET(S): 8.6 TABLET ORAL at 21:18

## 2018-12-25 RX ADMIN — Medication 100 MICROGRAM(S): at 06:10

## 2018-12-25 RX ADMIN — PREGABALIN 1000 MICROGRAM(S): 225 CAPSULE ORAL at 14:04

## 2018-12-25 RX ADMIN — LINACLOTIDE 145 MICROGRAM(S): 145 CAPSULE, GELATIN COATED ORAL at 06:12

## 2018-12-25 RX ADMIN — QUETIAPINE FUMARATE 25 MILLIGRAM(S): 200 TABLET, FILM COATED ORAL at 17:49

## 2018-12-25 RX ADMIN — Medication 81 MILLIGRAM(S): at 14:03

## 2018-12-25 RX ADMIN — CEFTRIAXONE 100 GRAM(S): 500 INJECTION, POWDER, FOR SOLUTION INTRAMUSCULAR; INTRAVENOUS at 14:04

## 2018-12-25 RX ADMIN — Medication 100 MILLIGRAM(S): at 06:10

## 2018-12-25 RX ADMIN — ENOXAPARIN SODIUM 40 MILLIGRAM(S): 100 INJECTION SUBCUTANEOUS at 21:18

## 2018-12-25 RX ADMIN — Medication 100 MILLIGRAM(S): at 21:18

## 2018-12-25 NOTE — PROGRESS NOTE ADULT - PROBLEM SELECTOR PLAN 9
DVT ppx: Lovenox  Diet: dysphagia w/ thin liquids, cyanocobalamin for B12 deficiency   Code Status: DNR/DNI, paper and MOLST in chart    Mayelin Golden MD  PGY-2, Internal Medicine  Pager #[phone] DVT ppx: Lovenox  Diet: dysphagia w/ thin liquids  Code Status: DNR/DNI, paper and MOLST in chart    Mayelin Golden MD  PGY-2, Internal Medicine  Pager #[phone]

## 2018-12-25 NOTE — PROGRESS NOTE ADULT - ASSESSMENT
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

## 2018-12-25 NOTE — PROGRESS NOTE ADULT - SUBJECTIVE AND OBJECTIVE BOX
Manpreet Boykin MD PGY1   Pager: [phone] [facility], [phone] [facility]  After 7: Night Float pager    Patient is a 89y old  Female who presents with a chief complaint of UTI, weakness (24 Dec 2018 17:52)      SUBJECTIVE / OVERNIGHT EVENTS:    MEDICATIONS  (STANDING):  amiodarone    Tablet 100 milliGRAM(s) Oral daily  aspirin  chewable 81 milliGRAM(s) Oral daily  atorvastatin 10 milliGRAM(s) Oral at bedtime  cefTRIAXone   IVPB 1 Gram(s) IV Intermittent every 24 hours  cyanocobalamin 1000 MICROGram(s) Oral daily  docusate sodium 100 milliGRAM(s) Oral three times a day  enoxaparin Injectable 40 milliGRAM(s) SubCutaneous every 24 hours  furosemide    Tablet 20 milliGRAM(s) Oral <User Schedule>  levothyroxine 100 MICROGram(s) Oral daily  linaclotide 145 MICROGram(s) Oral before breakfast  LORazepam     Tablet 0.25 milliGRAM(s) Oral daily  multivitamin 1 Tablet(s) Oral daily  polyethylene glycol 3350 17 Gram(s) Oral daily  QUEtiapine 25 milliGRAM(s) Oral at bedtime  senna 2 Tablet(s) Oral at bedtime  spironolactone 12.5 milliGRAM(s) Oral <User Schedule>    MEDICATIONS  (PRN):      Vital Signs Last 24 Hrs  T(C): 36.6 (25 Dec 2018 06:08), Max: 37.1 (24 Dec 2018 22:22)  T(F): 97.9 (25 Dec 2018 06:08), Max: 98.7 (24 Dec 2018 22:22)  HR: 60 (25 Dec 2018 06:08) (59 - 68)  BP: 113/53 (25 Dec 2018 06:08) (113/53 - 135/65)  BP(mean): --  RR: 17 (25 Dec 2018 06:08) (16 - 18)  SpO2: 95% (25 Dec 2018 06:08) (95% - 100%)  CAPILLARY BLOOD GLUCOSE        I&O's Summary      PHYSICAL EXAM:  GENERAL: NAD, well-developed  EYES: EOMI, PERRLA, conjunctiva and sclera clear  NECK:  No JVD  CHEST/LUNG: CTABL ; No wheeze  HEART: RRR; No murmurs  ABDOMEN: Soft, Nontender, Nondistended; Bowel sounds present  : No Bobby  EXTREMITIES:  2+ Peripheral Pulses, No edema  PSYCH: AAOx3  NEUROLOGY: non-focal  SKIN: No rashes or lesions. No sacral ulcer    LABS:                        11.3   10.64 )-----------( 305      ( 24 Dec 2018 12:50 )             36.3         140  |  104  |  27<H>  ----------------------------<  88  4.6   |  23  |  0.79    Ca    8.6      24 Dec 2018 12:50    TPro  6.8  /  Alb  3.1<L>  /  TBili  0.3  /  DBili  x   /  AST  13  /  ALT  10  /  AlkPhos  84      PT/INR - ( 24 Dec 2018 12:21 )   PT: 12.2 SEC;   INR: 1.07          PTT - ( 24 Dec 2018 12:21 )  PTT:28.6 SEC      Urinalysis Basic - ( 24 Dec 2018 12:21 )    Color: YELLOW / Appearance: Lt TURBID / S.022 / pH: 6.0  Gluc: NEGATIVE / Ketone: NEGATIVE  / Bili: NEGATIVE / Urobili: NORMAL   Blood: NEGATIVE / Protein: 20 / Nitrite: POSITIVE   Leuk Esterase: LARGE / RBC: 6-10 / WBC >50   Sq Epi: FEW / Non Sq Epi: x / Bacteria: FEW        Microbiology;        RADIOLOGY & ADDITIONAL TESTS:    Imaging Personally Reviewed:          Consultant(s) Notes Reviewed:      Care Discussed with Consultants/Other Providers: Manpreet Boykin MD PGY1   Pager: [phone] [facility], [phone] [facility]  After 7: Night Float pager    Patient is a 89y old  Female who presents with a chief complaint of UTI, weakness (24 Dec 2018 17:52)      SUBJECTIVE / OVERNIGHT EVENTS: Overnight, patient admitted to medicine floor for UTI. Patient seen and examined at bedside this AM, she is AAO x1-2 able to state her name and that she is in "hospital" but does not recall which hospital and states the year is . She complains of "not feeling well." Patient has daily HHA, HHA at bedside reports complaints of foul-smelling urine x 3 days, generalized weakness.     MEDICATIONS  (STANDING):  amiodarone    Tablet 100 milliGRAM(s) Oral daily  aspirin  chewable 81 milliGRAM(s) Oral daily  atorvastatin 10 milliGRAM(s) Oral at bedtime  cefTRIAXone   IVPB 1 Gram(s) IV Intermittent every 24 hours  cyanocobalamin 1000 MICROGram(s) Oral daily  docusate sodium 100 milliGRAM(s) Oral three times a day  enoxaparin Injectable 40 milliGRAM(s) SubCutaneous every 24 hours  furosemide    Tablet 20 milliGRAM(s) Oral <User Schedule>  levothyroxine 100 MICROGram(s) Oral daily  linaclotide 145 MICROGram(s) Oral before breakfast  LORazepam     Tablet 0.25 milliGRAM(s) Oral daily  multivitamin 1 Tablet(s) Oral daily  polyethylene glycol 3350 17 Gram(s) Oral daily  QUEtiapine 25 milliGRAM(s) Oral at bedtime  senna 2 Tablet(s) Oral at bedtime  spironolactone 12.5 milliGRAM(s) Oral <User Schedule>    MEDICATIONS  (PRN):      Vital Signs Last 24 Hrs  T(C): 36.6 (25 Dec 2018 06:08), Max: 37.1 (24 Dec 2018 22:22)  T(F): 97.9 (25 Dec 2018 06:08), Max: 98.7 (24 Dec 2018 22:22)  HR: 60 (25 Dec 2018 06:08) (59 - 68)  BP: 113/53 (25 Dec 2018 06:08) (113/53 - 135/65)  BP(mean): --  RR: 17 (25 Dec 2018 06:08) (16 - 18)  SpO2: 95% (25 Dec 2018 06:08) (95% - 100%)  CAPILLARY BLOOD GLUCOSE        I&O's Summary      PHYSICAL EXAM:  GENERAL: NAD, well-developed  EYES: EOMI, PERRLA, conjunctiva and sclera clear  NECK:  No JVD  CHEST/LUNG: mild crackles from right mid-lower lung   HEART: systolic murmur best appreciated in mitral area.   ABDOMEN: Soft, Nontender, Nondistended; Bowel sounds present  : No Bobby  EXTREMITIES:  2+ Peripheral Pulses, No edema  PSYCH: AAOx1-2  NEUROLOGY: non-focal  SKIN: No rashes or lesions. No sacral ulcer    LABS:                        11.3   10.64 )-----------( 305      ( 24 Dec 2018 12:50 )             36.3     12-    140  |  104  |  27<H>  ----------------------------<  88  4.6   |  23  |  0.79    Ca    8.6      24 Dec 2018 12:50    TPro  6.8  /  Alb  3.1<L>  /  TBili  0.3  /  DBili  x   /  AST  13  /  ALT  10  /  AlkPhos  84  12-24    PT/INR - ( 24 Dec 2018 12:21 )   PT: 12.2 SEC;   INR: 1.07          PTT - ( 24 Dec 2018 12:21 )  PTT:28.6 SEC      Urinalysis Basic - ( 24 Dec 2018 12:21 )    Color: YELLOW / Appearance: Lt TURBID / S.022 / pH: 6.0  Gluc: NEGATIVE / Ketone: NEGATIVE  / Bili: NEGATIVE / Urobili: NORMAL   Blood: NEGATIVE / Protein: 20 / Nitrite: POSITIVE   Leuk Esterase: LARGE / RBC: 6-10 / WBC >50   Sq Epi: FEW / Non Sq Epi: x / Bacteria: FEW        Microbiology;        RADIOLOGY & ADDITIONAL TESTS:    Imaging Personally Reviewed:

## 2018-12-25 NOTE — PROGRESS NOTE ADULT - PROBLEM SELECTOR PLAN 8
DVT ppx: Lovenox  Diet: dysphagia w/ thin liquids  Code Status: DNR/DNI, paper and MOLST in chart    Mayelin Golden MD  PGY-2, Internal Medicine  Pager #[phone] - continue cyanocobalamin - likely B12 deficiency? with prior elevated MMA  - continue cyanocobalamin

## 2018-12-25 NOTE — PROGRESS NOTE ADULT - PROBLEM SELECTOR PLAN 1
UA w/ large LE and +nitrites. Patient does have a h/o frequent UTI's and is on methenamine at home.   - s/p 1g ceftriaxone in ED  - would c/w ceftriaxone for another 2 days  - f/u urine cx UA w/ large LE and +nitrites. Patient does have a h/o frequent UTI's and is on methenamine at home.   - s/p 1g ceftriaxone in ED  - would c/w ceftriaxone for another 2 days  - f/u urine cx and tailor antibiotics

## 2018-12-26 LAB
BASOPHILS # BLD AUTO: 0.04 K/UL — SIGNIFICANT CHANGE UP (ref 0–0.2)
BASOPHILS NFR BLD AUTO: 0.5 % — SIGNIFICANT CHANGE UP (ref 0–2)
BUN SERPL-MCNC: 20 MG/DL — SIGNIFICANT CHANGE UP (ref 7–23)
CALCIUM SERPL-MCNC: 8.7 MG/DL — SIGNIFICANT CHANGE UP (ref 8.4–10.5)
CHLORIDE SERPL-SCNC: 107 MMOL/L — SIGNIFICANT CHANGE UP (ref 98–107)
CO2 SERPL-SCNC: 22 MMOL/L — SIGNIFICANT CHANGE UP (ref 22–31)
CREAT SERPL-MCNC: 0.68 MG/DL — SIGNIFICANT CHANGE UP (ref 0.5–1.3)
EOSINOPHIL # BLD AUTO: 0.4 K/UL — SIGNIFICANT CHANGE UP (ref 0–0.5)
EOSINOPHIL NFR BLD AUTO: 5 % — SIGNIFICANT CHANGE UP (ref 0–6)
GLUCOSE SERPL-MCNC: 99 MG/DL — SIGNIFICANT CHANGE UP (ref 70–99)
HCT VFR BLD CALC: 33.5 % — LOW (ref 34.5–45)
HGB BLD-MCNC: 10.8 G/DL — LOW (ref 11.5–15.5)
IMM GRANULOCYTES # BLD AUTO: 0.04 # — SIGNIFICANT CHANGE UP
IMM GRANULOCYTES NFR BLD AUTO: 0.5 % — SIGNIFICANT CHANGE UP (ref 0–1.5)
LYMPHOCYTES # BLD AUTO: 1.68 K/UL — SIGNIFICANT CHANGE UP (ref 1–3.3)
LYMPHOCYTES # BLD AUTO: 20.8 % — SIGNIFICANT CHANGE UP (ref 13–44)
MAGNESIUM SERPL-MCNC: 2.1 MG/DL — SIGNIFICANT CHANGE UP (ref 1.6–2.6)
MCHC RBC-ENTMCNC: 32 PG — SIGNIFICANT CHANGE UP (ref 27–34)
MCHC RBC-ENTMCNC: 32.2 % — SIGNIFICANT CHANGE UP (ref 32–36)
MCV RBC AUTO: 99.4 FL — SIGNIFICANT CHANGE UP (ref 80–100)
MONOCYTES # BLD AUTO: 0.75 K/UL — SIGNIFICANT CHANGE UP (ref 0–0.9)
MONOCYTES NFR BLD AUTO: 9.3 % — SIGNIFICANT CHANGE UP (ref 2–14)
NEUTROPHILS # BLD AUTO: 5.16 K/UL — SIGNIFICANT CHANGE UP (ref 1.8–7.4)
NEUTROPHILS NFR BLD AUTO: 63.9 % — SIGNIFICANT CHANGE UP (ref 43–77)
NRBC # FLD: 0 — SIGNIFICANT CHANGE UP
PHOSPHATE SERPL-MCNC: 3.5 MG/DL — SIGNIFICANT CHANGE UP (ref 2.5–4.5)
PLATELET # BLD AUTO: 294 K/UL — SIGNIFICANT CHANGE UP (ref 150–400)
PMV BLD: 10.4 FL — SIGNIFICANT CHANGE UP (ref 7–13)
POTASSIUM SERPL-MCNC: 4.4 MMOL/L — SIGNIFICANT CHANGE UP (ref 3.5–5.3)
POTASSIUM SERPL-SCNC: 4.4 MMOL/L — SIGNIFICANT CHANGE UP (ref 3.5–5.3)
RBC # BLD: 3.37 M/UL — LOW (ref 3.8–5.2)
RBC # FLD: 13.4 % — SIGNIFICANT CHANGE UP (ref 10.3–14.5)
SODIUM SERPL-SCNC: 140 MMOL/L — SIGNIFICANT CHANGE UP (ref 135–145)
VIT B12 SERPL-MCNC: 1029 PG/ML — HIGH (ref 200–900)
WBC # BLD: 8.07 K/UL — SIGNIFICANT CHANGE UP (ref 3.8–10.5)
WBC # FLD AUTO: 8.07 K/UL — SIGNIFICANT CHANGE UP (ref 3.8–10.5)

## 2018-12-26 PROCEDURE — 99232 SBSQ HOSP IP/OBS MODERATE 35: CPT

## 2018-12-26 RX ORDER — QUETIAPINE FUMARATE 200 MG/1
25 TABLET, FILM COATED ORAL
Qty: 0 | Refills: 0 | Status: DISCONTINUED | OUTPATIENT
Start: 2018-12-26 | End: 2018-12-28

## 2018-12-26 RX ADMIN — NYSTATIN CREAM 1 APPLICATION(S): 100000 CREAM TOPICAL at 17:23

## 2018-12-26 RX ADMIN — ATORVASTATIN CALCIUM 10 MILLIGRAM(S): 80 TABLET, FILM COATED ORAL at 21:08

## 2018-12-26 RX ADMIN — CEFTRIAXONE 100 GRAM(S): 500 INJECTION, POWDER, FOR SOLUTION INTRAMUSCULAR; INTRAVENOUS at 13:01

## 2018-12-26 RX ADMIN — PREGABALIN 1000 MICROGRAM(S): 225 CAPSULE ORAL at 12:59

## 2018-12-26 RX ADMIN — NYSTATIN CREAM 1 APPLICATION(S): 100000 CREAM TOPICAL at 05:49

## 2018-12-26 RX ADMIN — Medication 1 TABLET(S): at 12:58

## 2018-12-26 RX ADMIN — Medication 100 MILLIGRAM(S): at 13:01

## 2018-12-26 RX ADMIN — Medication 81 MILLIGRAM(S): at 12:59

## 2018-12-26 RX ADMIN — POLYETHYLENE GLYCOL 3350 17 GRAM(S): 17 POWDER, FOR SOLUTION ORAL at 12:58

## 2018-12-26 RX ADMIN — Medication 100 MILLIGRAM(S): at 21:08

## 2018-12-26 RX ADMIN — Medication 100 MILLIGRAM(S): at 05:47

## 2018-12-26 RX ADMIN — LINACLOTIDE 145 MICROGRAM(S): 145 CAPSULE, GELATIN COATED ORAL at 06:43

## 2018-12-26 RX ADMIN — Medication 100 MICROGRAM(S): at 05:47

## 2018-12-26 RX ADMIN — SENNA PLUS 2 TABLET(S): 8.6 TABLET ORAL at 21:08

## 2018-12-26 RX ADMIN — QUETIAPINE FUMARATE 25 MILLIGRAM(S): 200 TABLET, FILM COATED ORAL at 17:59

## 2018-12-26 RX ADMIN — AMIODARONE HYDROCHLORIDE 100 MILLIGRAM(S): 400 TABLET ORAL at 05:47

## 2018-12-26 NOTE — DIETITIAN INITIAL EVALUATION ADULT. - PERTINENT LABORATORY DATA
12-26 Na 140 mmol/L Glu 99 mg/dL K+ 4.4 mmol/L Cr 0.68 mg/dL BUN 20 mg/dL Phos 3.5 mg/dL Alb n/a   PAB n/a   Hgb 10.8 g/dL<L> Hct 33.5 %<L> HgA1C n/a    Glucose, Serum: 99 mg/dL

## 2018-12-26 NOTE — DIETITIAN INITIAL EVALUATION ADULT. - OTHER INFO
Patient seen for nutrition consult for decreased PO intake prior to admission. 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 Son reports patient normally eats well at baseline and has a good appetite. NKFA. Patient consumes soft foods for ease of chewing. No swallowing difficulty reported. No GI distress (nausea/vomiting/diarrhea/constipation) noted at this time. Son could not provide patient's UBW but notes that patient did not appear to have lost any weight. Patient eating more while in-house and drinking Ensure Enlive supplements in-house.

## 2018-12-26 NOTE — DIETITIAN INITIAL EVALUATION ADULT. - NS AS NUTRI INTERV MEALS SNACK
Texture-modified diet/Continue Dysphagia 3 Soft diet with thin liquids. Monitor for signs/symptoms of chewing/swallowing difficulty.

## 2018-12-26 NOTE — DIETITIAN INITIAL EVALUATION ADULT. - ENERGY NEEDS
Height (cm): 172.7 (24 Dec 2018 19:38)  Weight (kg): 70 (24 Dec 2018 19:38)  BMI (kg/m2): 23.5 (24 Dec 2018 19:38)  IBW: 63.6kg +/-10%  No pressure ulcers/DTI noted per flowsheets. No edema noted.

## 2018-12-26 NOTE — PROGRESS NOTE ADULT - PROBLEM SELECTOR PLAN 2
Patient is baseline AOx2, and she is currently at baseline.  - c/w home dose of Seroquel 25mg daily, 0.5 Ativan mg daily Patient is baseline AOx2, and she is currently AAO x3, improved   - c/w home dose of Seroquel 25mg daily, 0.5 Ativan mg daily

## 2018-12-26 NOTE — DIETITIAN INITIAL EVALUATION ADULT. - PERTINENT MEDS FT
amiodarone    Tablet  aspirin  chewable  atorvastatin  cefTRIAXone   IVPB  cyanocobalamin  docusate sodium  enoxaparin Injectable  furosemide    Tablet  levothyroxine  linaclotide  LORazepam     Tablet PRN  multivitamin  nystatin Powder  polyethylene glycol 3350  QUEtiapine  senna  spironolactone

## 2018-12-26 NOTE — PROGRESS NOTE ADULT - ASSESSMENT
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

## 2018-12-26 NOTE — DIETITIAN INITIAL EVALUATION ADULT. - SOURCE
family/significant other/other (specify)/patient/Patient A&Ox2, provided limited information. [facility] at bedside. Spoke to patient's son via phone call during time of visit. Medical record reviewed.

## 2018-12-26 NOTE — PROGRESS NOTE ADULT - PROBLEM SELECTOR PLAN 1
UA w/ large LE and +nitrites. Patient does have a h/o frequent UTI's and is on methenamine at home.   - s/p 1g ceftriaxone in ED  - would c/w ceftriaxone for another 2 days  - f/u urine cx and tailor antibiotics UA w/ large LE and +nitrites. Patient does have a h/o frequent UTI's and is on methenamine at home.   - s/p 1g ceftriaxone in ED  - would c/w ceftriaxone for another 2 days, today is last day 12/26  - urine cx not collected in ED, resent today, contacted PCP Dr. Ermelinda Hill but  was unable to find prior UC UA w/ large LE and +nitrites. Patient does have a h/o frequent UTI's and is on methenamine at home.   - s/p 1g ceftriaxone in ED  - would c/w ceftriaxone for another day, today is last day 12/26  - urine cx not collected in ED, resent today, contacted PCP Dr. Ermelinda Hill but  was unable to find prior urine culture

## 2018-12-26 NOTE — DIETITIAN INITIAL EVALUATION ADULT. - NS AS NUTRI INTERV COLLABORAT
Recommend bedside swallow evaluation by SLP to determine the appropriate food and beverage consistency.

## 2018-12-26 NOTE — DIETITIAN INITIAL EVALUATION ADULT. - PROBLEM SELECTOR PLAN 8
DVT ppx: Lovenox  Diet: dysphagia w/ thin liquids  Code Status: DNR/DNI, paper and MOLST in chart    Mayelin Golden MD  PGY-2, Internal Medicine  Pager #[phone]

## 2018-12-26 NOTE — PROGRESS NOTE ADULT - SUBJECTIVE AND OBJECTIVE BOX
Manpreet Boykin MD PGY1   Pager: [phone] [address], [phone] [facility]  After 7: Night Float pager    Patient is a 89y old  Female who presents with a chief complaint of UTI, weakness (25 Dec 2018 09:28)      SUBJECTIVE / OVERNIGHT EVENTS:    MEDICATIONS  (STANDING):  amiodarone    Tablet 100 milliGRAM(s) Oral daily  aspirin  chewable 81 milliGRAM(s) Oral daily  atorvastatin 10 milliGRAM(s) Oral at bedtime  cefTRIAXone   IVPB 1 Gram(s) IV Intermittent every 24 hours  cyanocobalamin 1000 MICROGram(s) Oral daily  docusate sodium 100 milliGRAM(s) Oral three times a day  enoxaparin Injectable 40 milliGRAM(s) SubCutaneous every 24 hours  furosemide    Tablet 20 milliGRAM(s) Oral <User Schedule>  levothyroxine 100 MICROGram(s) Oral daily  linaclotide 145 MICROGram(s) Oral before breakfast  multivitamin 1 Tablet(s) Oral daily  nystatin Powder 1 Application(s) Topical two times a day  polyethylene glycol 3350 17 Gram(s) Oral daily  QUEtiapine 25 milliGRAM(s) Oral at bedtime  senna 2 Tablet(s) Oral at bedtime  spironolactone 12.5 milliGRAM(s) Oral <User Schedule>    MEDICATIONS  (PRN):  LORazepam     Tablet 0.5 milliGRAM(s) Oral at bedtime PRN Agitation      Vital Signs Last 24 Hrs  T(C): 36.5 (26 Dec 2018 05:44), Max: 36.6 (25 Dec 2018 22:33)  T(F): 97.7 (26 Dec 2018 05:44), Max: 97.9 (25 Dec 2018 22:33)  HR: 60 (26 Dec 2018 05:44) (60 - 60)  BP: 141/68 (26 Dec 2018 05:44) (115/55 - 141/68)  BP(mean): --  RR: 17 (26 Dec 2018 05:44) (17 - 18)  SpO2: 100% (26 Dec 2018 05:44) (96% - 100%)  CAPILLARY BLOOD GLUCOSE        I&O's Summary      PHYSICAL EXAM:  GENERAL: NAD, well-developed  EYES: EOMI, PERRLA, conjunctiva and sclera clear  NECK:  No JVD  CHEST/LUNG: CTABL ; No wheeze  HEART: RRR; No murmurs  ABDOMEN: Soft, Nontender, Nondistended; Bowel sounds present  : No Bobby  EXTREMITIES:  2+ Peripheral Pulses, No edema  PSYCH: AAOx3  NEUROLOGY: non-focal  SKIN: No rashes or lesions. No sacral ulcer    LABS:                        10.8   8.07  )-----------( 294      ( 26 Dec 2018 06:30 )             33.5     12-    140  |  107  |  20  ----------------------------<  99  4.4   |  22  |  0.68    Ca    8.7      26 Dec 2018 06:30  Phos  3.5     12  Mg     2.1     -    TPro  6.8  /  Alb  3.1<L>  /  TBili  0.3  /  DBili  x   /  AST  13  /  ALT  10  /  AlkPhos  84  12-24    PT/INR - ( 24 Dec 2018 12:21 )   PT: 12.2 SEC;   INR: 1.07          PTT - ( 24 Dec 2018 12:21 )  PTT:28.6 SEC      Urinalysis Basic - ( 24 Dec 2018 12:21 )    Color: YELLOW / Appearance: Lt TURBID / S.022 / pH: 6.0  Gluc: NEGATIVE / Ketone: NEGATIVE  / Bili: NEGATIVE / Urobili: NORMAL   Blood: NEGATIVE / Protein: 20 / Nitrite: POSITIVE   Leuk Esterase: LARGE / RBC: 6-10 / WBC >50   Sq Epi: FEW / Non Sq Epi: x / Bacteria: FEW        Microbiology;        RADIOLOGY & ADDITIONAL TESTS:    Imaging Personally Reviewed:          Consultant(s) Notes Reviewed:      Care Discussed with Consultants/Other Providers: Manpreet Boykin MD PGY1   Pager: [phone] [address], [phone] [facility]  After 7: Night Float pager    Patient is a 89y old  Female who presents with a chief complaint of UTI, weakness (25 Dec 2018 09:28)      SUBJECTIVE / OVERNIGHT EVENTS: Last night, patient with some agitation, received seroquel 25mg. Otherwise, no acute events overnight. Patient seen and examined at bedside this AM, denying dysuria, increased frequency of urination, AAOx3 improved from yesterday, otherwise denying acute complaints.     MEDICATIONS  (STANDING):  amiodarone    Tablet 100 milliGRAM(s) Oral daily  aspirin  chewable 81 milliGRAM(s) Oral daily  atorvastatin 10 milliGRAM(s) Oral at bedtime  cefTRIAXone   IVPB 1 Gram(s) IV Intermittent every 24 hours  cyanocobalamin 1000 MICROGram(s) Oral daily  docusate sodium 100 milliGRAM(s) Oral three times a day  enoxaparin Injectable 40 milliGRAM(s) SubCutaneous every 24 hours  furosemide    Tablet 20 milliGRAM(s) Oral <User Schedule>  levothyroxine 100 MICROGram(s) Oral daily  linaclotide 145 MICROGram(s) Oral before breakfast  multivitamin 1 Tablet(s) Oral daily  nystatin Powder 1 Application(s) Topical two times a day  polyethylene glycol 3350 17 Gram(s) Oral daily  QUEtiapine 25 milliGRAM(s) Oral at bedtime  senna 2 Tablet(s) Oral at bedtime  spironolactone 12.5 milliGRAM(s) Oral <User Schedule>    MEDICATIONS  (PRN):  LORazepam     Tablet 0.5 milliGRAM(s) Oral at bedtime PRN Agitation      Vital Signs Last 24 Hrs  T(C): 36.5 (26 Dec 2018 05:44), Max: 36.6 (25 Dec 2018 22:33)  T(F): 97.7 (26 Dec 2018 05:44), Max: 97.9 (25 Dec 2018 22:33)  HR: 60 (26 Dec 2018 05:44) (60 - 60)  BP: 141/68 (26 Dec 2018 05:44) (115/55 - 141/68)  BP(mean): --  RR: 17 (26 Dec 2018 05:44) (17 - 18)  SpO2: 100% (26 Dec 2018 05:44) (96% - 100%)  CAPILLARY BLOOD GLUCOSE        I&O's Summary      PHYSICAL EXAM:  GENERAL: NAD, well-developed  EYES: EOMI, PERRLA, conjunctiva and sclera clear  NECK:  No JVD  CHEST/LUNG: CTABL ; No wheeze  HEART: systolic murmur best appreciated in aortic area   ABDOMEN: Soft, Nontender, Nondistended; Bowel sounds present, no suprapubic tenderness  : No Bobby, no suprapubic or CVA tenderness   EXTREMITIES:  2+ Peripheral Pulses, No edema  PSYCH: AAOx3, hard of hearing   NEUROLOGY: non-focal  SKIN: No rashes or lesions. No sacral ulcer    LABS:                        10.8   8.07  )-----------( 294      ( 26 Dec 2018 06:30 )             33.5         140  |  107  |  20  ----------------------------<  99  4.4   |  22  |  0.68    Ca    8.7      26 Dec 2018 06:30  Phos  3.5       Mg     2.1         TPro  6.8  /  Alb  3.1<L>  /  TBili  0.3  /  DBili  x   /  AST  13  /  ALT  10  /  AlkPhos  84  12-    PT/INR - ( 24 Dec 2018 12:21 )   PT: 12.2 SEC;   INR: 1.07          PTT - ( 24 Dec 2018 12:21 )  PTT:28.6 SEC      Urinalysis Basic - ( 24 Dec 2018 12:21 )    Color: YELLOW / Appearance: Lt TURBID / S.022 / pH: 6.0  Gluc: NEGATIVE / Ketone: NEGATIVE  / Bili: NEGATIVE / Urobili: NORMAL   Blood: NEGATIVE / Protein: 20 / Nitrite: POSITIVE   Leuk Esterase: LARGE / RBC: 6-10 / WBC >50   Sq Epi: FEW / Non Sq Epi: x / Bacteria: FEW        Microbiology;        RADIOLOGY & ADDITIONAL TESTS:    Imaging Personally Reviewed:

## 2018-12-26 NOTE — DIETITIAN INITIAL EVALUATION ADULT. - NS AS NUTRI INTERV FEED ASSISTANCE
Maintain aspiration precautions. Please Encourage po intake, assist with meals and menu selections, provide alternatives PRN.

## 2018-12-26 NOTE — PROGRESS NOTE ADULT - PROBLEM SELECTOR PLAN 8
- likely B12 deficiency? with prior elevated MMA  - continue cyanocobalamin - likely B12 deficiency, currently normal B12 but with prior elevated MMA  - continue cyanocobalamin daily

## 2018-12-26 NOTE — DIETITIAN INITIAL EVALUATION ADULT. - FACTORS AFF FOOD INTAKE
change in sense of smell or taste/Voodoo/ethnic/cultural/personal food preferences/decreased appetite/difficulty chewing

## 2018-12-26 NOTE — DIETITIAN INITIAL EVALUATION ADULT. - NUTRITION INTERVENTION
Feeding Assistance/Medical Food Supplements/Meals and Snack/Vitamin/Collaboration and Referral of Nutrition Care

## 2018-12-26 NOTE — PROGRESS NOTE ADULT - PROBLEM SELECTOR PLAN 9
DVT ppx: Lovenox  Diet: dysphagia w/ thin liquids  Code Status: DNR/DNI, paper and MOLST in chart    Mayelin Golden MD  PGY-2, Internal Medicine  Pager #[phone] DVT ppx: Lovenox  Diet: dysphagia w/ thin liquids  Code Status: DNR/DNI, paper and MOLST in chart

## 2018-12-27 ENCOUNTER — TRANSCRIPTION ENCOUNTER (OUTPATIENT)
Age: 83
End: 2018-12-27

## 2018-12-27 LAB — SPECIMEN SOURCE: SIGNIFICANT CHANGE UP

## 2018-12-27 PROCEDURE — 99232 SBSQ HOSP IP/OBS MODERATE 35: CPT | Mod: GC

## 2018-12-27 RX ORDER — POLYETHYLENE GLYCOL 3350 17 G/17G
17 POWDER, FOR SOLUTION ORAL DAILY
Qty: 0 | Refills: 0 | Status: DISCONTINUED | OUTPATIENT
Start: 2018-12-27 | End: 2018-12-28

## 2018-12-27 RX ADMIN — ATORVASTATIN CALCIUM 10 MILLIGRAM(S): 80 TABLET, FILM COATED ORAL at 21:53

## 2018-12-27 RX ADMIN — AMIODARONE HYDROCHLORIDE 100 MILLIGRAM(S): 400 TABLET ORAL at 07:11

## 2018-12-27 RX ADMIN — ENOXAPARIN SODIUM 40 MILLIGRAM(S): 100 INJECTION SUBCUTANEOUS at 21:53

## 2018-12-27 RX ADMIN — Medication 100 MILLIGRAM(S): at 07:11

## 2018-12-27 RX ADMIN — NYSTATIN CREAM 1 APPLICATION(S): 100000 CREAM TOPICAL at 07:12

## 2018-12-27 RX ADMIN — NYSTATIN CREAM 1 APPLICATION(S): 100000 CREAM TOPICAL at 17:37

## 2018-12-27 RX ADMIN — Medication 100 MICROGRAM(S): at 07:12

## 2018-12-27 RX ADMIN — PREGABALIN 1000 MICROGRAM(S): 225 CAPSULE ORAL at 11:55

## 2018-12-27 RX ADMIN — QUETIAPINE FUMARATE 25 MILLIGRAM(S): 200 TABLET, FILM COATED ORAL at 18:28

## 2018-12-27 RX ADMIN — LINACLOTIDE 145 MICROGRAM(S): 145 CAPSULE, GELATIN COATED ORAL at 07:13

## 2018-12-27 RX ADMIN — Medication 20 MILLIGRAM(S): at 09:01

## 2018-12-27 RX ADMIN — Medication 1 TABLET(S): at 11:55

## 2018-12-27 RX ADMIN — SPIRONOLACTONE 12.5 MILLIGRAM(S): 25 TABLET, FILM COATED ORAL at 09:01

## 2018-12-27 RX ADMIN — Medication 81 MILLIGRAM(S): at 11:55

## 2018-12-27 NOTE — DISCHARGE NOTE ADULT - HOSPITAL COURSE
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     In the ED, initial vitals were: temp 98.2F, HR 68, /68, RR 16, saturating 95% on RA. Labs and imaging remarkable for: UA w/ large LE and +nitrites, WBC 10.64K, and CXR was unremarkable. Patient received 1g ceftriaxone.     Hospital Course: on the floors, the patient was continued on ceftriaxone for a total of 3 days inpatient to cover for uncomplicated UTI. Throughout her hospital course, the patient became more oriented and was denying symptoms of dysuria, frequency, or foul-smelling urine. Her WBC count trended down and she was stable for discharge with follow up with primary care, referral was given for family for home physical therapy. 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     In the ED, initial vitals were: temp 98.2F, HR 68, /68, RR 16, saturating 95% on RA. Labs and imaging remarkable for: UA w/ large LE and +nitrites, WBC 10.64K, and CXR was unremarkable. Patient received 1g ceftriaxone.     Hospital Course: on the floors, the patient was continued on ceftriaxone for a total of 3 days inpatient to cover for uncomplicated UTI. Throughout her hospital course, the patient became more oriented and was denying symptoms of dysuria, frequency, or foul-smelling urine. Her WBC count trended down and she was stable for discharge with follow up with primary care, PT ultimately recommended subacute rehab for dispo. 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     In the ED, initial vitals were: temp 98.2F, HR 68, /68, RR 16, saturating 95% on RA. Labs and imaging remarkable for: UA w/ large LE and +nitrites, WBC 10.64K, and CXR was unremarkable. Patient received 1g ceftriaxone.     Hospital Course: on the floors, the patient was continued on ceftriaxone for a total of 3 days inpatient to cover for uncomplicated UTI. Throughout her hospital course, the patient became more oriented and was denying symptoms of dysuria, frequency, or foul-smelling urine. Her WBC count trended down and normalized. Urine culture ultimately returned with [id number] for ESBL Proteus but urine culture was not taken until after patient received 2 days of abx. Given that the patient clinically improved on treatment with ceftriaxone, it was felt that the Proteus was likely colonization and not active infection whereas the infecting organism was likely sensitive to ceftriaxone and hence not present on the Urine culture. She is stable for discharge with follow up with primary care, PT ultimately recommended subacute rehab for dispo.

## 2018-12-27 NOTE — DISCHARGE NOTE ADULT - COMMUNITY RESOURCES
Has private hired [facility] 24H/7Days. [address] [address] [phone]    [facility] [phone]

## 2018-12-27 NOTE — PROGRESS NOTE ADULT - PROBLEM SELECTOR PLAN 7
- c/w senna, colace, miralax, and linzess - PRN miralax for constipation, currently having diarrhea so d/c'd senna, colace, linzess.

## 2018-12-27 NOTE — PROGRESS NOTE ADULT - PROBLEM SELECTOR PLAN 2
Patient is baseline AOx2, and she is currently AAO x3, improved   - c/w home dose of Seroquel 25mg daily, 0.5 Ativan mg daily

## 2018-12-27 NOTE — PROGRESS NOTE ADULT - PROBLEM SELECTOR PLAN 8
- likely B12 deficiency, currently normal B12 but with prior elevated MMA  - continue cyanocobalamin daily

## 2018-12-27 NOTE — PROGRESS NOTE ADULT - PROBLEM SELECTOR PLAN 9
DVT ppx: Lovenox  Diet: dysphagia w/ thin liquids  Code Status: DNR/DNI, paper and MOLST in chart    dispo: [facility]UDY pending

## 2018-12-27 NOTE — DISCHARGE NOTE ADULT - MEDICATION SUMMARY - MEDICATIONS TO TAKE
I will START or STAY ON the medications listed below when I get home from the hospital:    spironolactone 25 mg oral tablet  -- 0.5 tab(s) by mouth three times a week (Monday, Thursday, and Sunday)   -- Indication: For Heart failure with preserved ejection fraction    aspirin 81 mg oral tablet, chewable  -- 1 tab(s) by mouth once a day  -- Indication: For Heart failure with preserved ejection fraction    amiodarone 100 mg oral tablet  -- 1 tab(s) by mouth once a day  -- Indication: For Heart failure with preserved ejection fraction    LORazepam 0.5 mg oral tablet  -- 0.5 tab(s) by mouth once a day, As Needed  -- Indication: For Dementia    atorvastatin 10 mg oral tablet  -- 1 tab(s) by mouth once a day  -- Indication: For Heart failure with preserved ejection fraction    SEROquel 25 mg oral tablet  -- 1 tab(s) by mouth once a day (may repeat an additional tablet PRN)   -- Indication: For Dementia    furosemide 20 mg oral tablet  -- 1 tab(s) by mouth 3 times a week on Monday, Thursday, and Sunday   -- Indication: For Heart failure with preserved ejection fraction    Linzess 145 mcg oral capsule  -- 1 cap(s) by mouth once a day  -- Indication: For Constipation, chronic    Cranberry oral capsule  -- 1  by mouth once a day  -- Indication: For Urinary tract infection    polyethylene glycol 3350 oral powder for reconstitution  -- 17 gram(s) by mouth once a day  -- Indication: For Constipation, chronic    senna oral tablet  -- 2 tab(s) by mouth once a day (at bedtime)  -- Indication: For Constipation, chronic    docusate sodium 100 mg oral capsule  -- 1 cap(s) by mouth 3 times a day  -- Indication: For Constipation, chronic    levothyroxine 100 mcg (0.1 mg) oral tablet  -- 1 tab(s) by mouth once a day  -- Indication: For Hypothyroidism    methenamine hippurate 1 g oral tablet  -- 0.5 tab(s) by mouth once a day  -- Indication: For Urinary tract infection    B Complex 50 oral tablet, extended release  -- 1 tab(s) by mouth once a day  -- Indication: For vitamin    PreserVision AREDS 2 oral capsule  -- 1 cap(s) by mouth 2 times a day  -- Indication: For Macular degeneration    Multiple Vitamins oral tablet  -- 1 tab(s) by mouth once a day  -- Indication: For vitamin    cyanocobalamin 1000 mcg oral tablet  -- 1 tab(s) by mouth once a day  -- Indication: For vitamin

## 2018-12-27 NOTE — PROGRESS NOTE ADULT - PROBLEM SELECTOR PLAN 1
UA w/ large LE and +nitrites. Patient does have a h/o frequent UTI's and is on methenamine at home.   - s/p 1g ceftriaxone in ED  - s/p 3 day course ceftriaxone  - urine cx not collected in ED, resent today, contacted PCP Dr. Ermelinda Hill but  was unable to find prior urine culture

## 2018-12-27 NOTE — PROGRESS NOTE ADULT - SUBJECTIVE AND OBJECTIVE BOX
Manpreet Boykin MD PGY1   Pager: [phone] ADRIA, [phone] [facility]  After 7: Night Float pager    Patient is a 89y old  Female who presents with a chief complaint of UTI, weakness (27 Dec 2018 05:18)      SUBJECTIVE / OVERNIGHT EVENTS: Overnight, no acute events. Patient seen and examined at bedside this AM, denying acute complaints, AAOx2.     MEDICATIONS  (STANDING):  amiodarone    Tablet 100 milliGRAM(s) Oral daily  aspirin  chewable 81 milliGRAM(s) Oral daily  atorvastatin 10 milliGRAM(s) Oral at bedtime  cyanocobalamin 1000 MICROGram(s) Oral daily  docusate sodium 100 milliGRAM(s) Oral three times a day  enoxaparin Injectable 40 milliGRAM(s) SubCutaneous every 24 hours  furosemide    Tablet 20 milliGRAM(s) Oral <User Schedule>  levothyroxine 100 MICROGram(s) Oral daily  linaclotide 145 MICROGram(s) Oral before breakfast  multivitamin 1 Tablet(s) Oral daily  nystatin Powder 1 Application(s) Topical two times a day  polyethylene glycol 3350 17 Gram(s) Oral daily  QUEtiapine 25 milliGRAM(s) Oral <User Schedule>  senna 2 Tablet(s) Oral at bedtime  spironolactone 12.5 milliGRAM(s) Oral <User Schedule>    MEDICATIONS  (PRN):  LORazepam     Tablet 0.5 milliGRAM(s) Oral at bedtime PRN Agitation      Vital Signs Last 24 Hrs  T(C): 36.4 (27 Dec 2018 09:00), Max: 36.5 (26 Dec 2018 22:28)  T(F): 97.6 (27 Dec 2018 09:00), Max: 97.7 (26 Dec 2018 22:28)  HR: 62 (27 Dec 2018 09:00) (58 - 62)  BP: 124/59 (27 Dec 2018 09:00) (112/66 - 140/74)  BP(mean): --  RR: 18 (27 Dec 2018 09:00) (17 - 18)  SpO2: 98% (27 Dec 2018 09:00) (96% - 98%)  CAPILLARY BLOOD GLUCOSE        I&O's Summary    26 Dec 2018 07:01  -  27 Dec 2018 07:00  --------------------------------------------------------  IN: 100 mL / OUT: 0 mL / NET: 100 mL        PHYSICAL EXAM:  GENERAL: NAD, well-developed  EYES: EOMI, PERRLA, conjunctiva and sclera clear  NECK:  No JVD  CHEST/LUNG: CTABL ; No wheeze  HEART: systolic murmur in aortic area   ABDOMEN: Soft, Nontender, Nondistended; Bowel sounds present  : No Bobby, so suprapubic tenderness   EXTREMITIES:  2+ Peripheral Pulses, No edema  PSYCH: AAOx2  NEUROLOGY: non-focal  SKIN: No rashes or lesions. No sacral ulcer    LABS:                        10.8   8.07  )-----------( 294      ( 26 Dec 2018 06:30 )             33.5     12-26    140  |  107  |  20  ----------------------------<  99  4.4   |  22  |  0.68    Ca    8.7      26 Dec 2018 06:30  Phos  3.5     12-26  Mg     2.1     12-26                Microbiology;        RADIOLOGY & ADDITIONAL TESTS:    Imaging Personally Reviewed: Manpreet Boykin MD PGY1   Pager: [phone] ADRIA, [phone] [facility]  After 7: Night Float pager    Patient is a 89y old  Female who presents with a chief complaint of UTI, weakness (27 Dec 2018 05:18)      SUBJECTIVE / OVERNIGHT EVENTS: Overnight, no acute events. Patient seen and examined at bedside this AM, denying acute complaints, AAOx2.     MEDICATIONS  (STANDING):  amiodarone    Tablet 100 milliGRAM(s) Oral daily  aspirin  chewable 81 milliGRAM(s) Oral daily  atorvastatin 10 milliGRAM(s) Oral at bedtime  cyanocobalamin 1000 MICROGram(s) Oral daily  docusate sodium 100 milliGRAM(s) Oral three times a day  enoxaparin Injectable 40 milliGRAM(s) SubCutaneous every 24 hours  furosemide    Tablet 20 milliGRAM(s) Oral <User Schedule>  levothyroxine 100 MICROGram(s) Oral daily  linaclotide 145 MICROGram(s) Oral before breakfast  multivitamin 1 Tablet(s) Oral daily  nystatin Powder 1 Application(s) Topical two times a day  polyethylene glycol 3350 17 Gram(s) Oral daily  QUEtiapine 25 milliGRAM(s) Oral <User Schedule>  senna 2 Tablet(s) Oral at bedtime  spironolactone 12.5 milliGRAM(s) Oral <User Schedule>    MEDICATIONS  (PRN):  LORazepam     Tablet 0.5 milliGRAM(s) Oral at bedtime PRN Agitation      Vital Signs Last 24 Hrs  T(C): 36.4 (27 Dec 2018 09:00), Max: 36.5 (26 Dec 2018 22:28)  T(F): 97.6 (27 Dec 2018 09:00), Max: 97.7 (26 Dec 2018 22:28)  HR: 62 (27 Dec 2018 09:00) (58 - 62)  BP: 124/59 (27 Dec 2018 09:00) (112/66 - 140/74)  BP(mean): --  RR: 18 (27 Dec 2018 09:00) (17 - 18)  SpO2: 98% (27 Dec 2018 09:00) (96% - 98%)  CAPILLARY BLOOD GLUCOSE        I&O's Summary    26 Dec 2018 07:01  -  27 Dec 2018 07:00  --------------------------------------------------------  IN: 100 mL / OUT: 0 mL / NET: 100 mL        PHYSICAL EXAM:  GENERAL: NAD, well-developed  EYES: EOMI, PERRLA, conjunctiva and sclera clear  NECK:  No JVD  CHEST/LUNG: CTABL ; No wheeze  HEART: systolic murmur in aortic area   ABDOMEN: Soft, Nontender, Nondistended; Bowel sounds present  : No Bobby, so suprapubic tenderness   EXTREMITIES:  2+ Peripheral Pulses, No edema  PSYCH: AAOx2  NEUROLOGY: non-focal  SKIN: No rashes or lesions. No sacral ulcer    LABS:                        10.8   8.07  )-----------( 294      ( 26 Dec 2018 06:30 )             33.5     12-26    140  |  107  |  20  ----------------------------<  99  4.4   |  22  |  0.68    Ca    8.7      26 Dec 2018 06:30  Phos  3.5     12-26  Mg     2.1     12-26                Microbiology;    Culture - Urine:   GNRID^Gram Neg Heraclio To Be Identified  COLONY COUNT: 10,000-49,000 CFU/mL (12.26.18 @ 09:59)        RADIOLOGY & ADDITIONAL TESTS:    Imaging Personally Reviewed:

## 2018-12-27 NOTE — DISCHARGE NOTE ADULT - CARE PROVIDER_API CALL
Ermelinda Hill), Geriatric Medicine; Internal Medicine  [address]  [address]  [place] [address]  Phone: [phone]  Fax: [phone]    Luci Souza), Cardiovascular Disease; Interventional Cardiology  [address] [place] [address]  Phone: [phone]  Fax: [phone]

## 2018-12-27 NOTE — DISCHARGE NOTE ADULT - CARE PLAN
Principal Discharge DX:	Urinary tract infection without hematuria, site unspecified  Goal:	treat infection  Assessment and plan of treatment:	In the hospital you were found to have a urinary tract infection, or an infection in your urine. You were treated with antibiotics for 3 days for this infection. You have a history of recurrent urinary tract infections so you must continue to take your methenamine at home. You must follow up with Dr. Ermelinda Hill [phone] your primary care Doctor for continued management of your recurrent UTIs, you may consider getting a referral to a urology doctor for further management if desired.

## 2018-12-27 NOTE — DISCHARGE NOTE ADULT - PATIENT PORTAL LINK FT
You can access the [facility] Patient Portal, offered by [facility], by registering with the following website: [email or web address]

## 2018-12-27 NOTE — DISCHARGE NOTE ADULT - INSTRUCTIONS
low sodium, heart healthy diet If any complaints of nausea, vomiting, diarrhea, fever or change in mental status notify facility staff

## 2018-12-27 NOTE — DISCHARGE NOTE ADULT - OTHER SIGNIFICANT FINDINGS
Urinalysis (12.24.18 @ 12:21)    Color: YELLOW    Urine Appearance: Lt TURBID    Glucose: NEGATIVE    Bilirubin: NEGATIVE    Ketone - Urine: NEGATIVE    Specific Gravity: 1.022    Blood: NEGATIVE    pH - Urine: 6.0    Protein, Urine: 20    Urobilinogen: NORMAL    Nitrite: POSITIVE    Leukocyte Esterase Concentration: LARGE    Red Blood Cell - Urine: 6-10    White Blood Cell - Urine: >50    Hyaline Casts: NEGATIVE    Bacteria: FEW    Squamous Epithelial: FEW    Complete Blood Count + Automated Diff (12.24.18 @ 12:50)    Nucleated RBC #: 0    WBC Count: 10.64 K/uL    RBC Count: 3.48 M/uL    Hemoglobin: 11.3 g/dL    Hematocrit: 36.3 %    Mean Cell Volume: 104.3 fL    Mean Cell Hemoglobin: 32.5 pg    Mean Cell Hemoglobin Conc: 31.1 %    Red Cell Distrib Width: 13.5 %    Platelet Count - Automated: 305 K/uL    MPV: 10.1 fl    Auto Neutrophil #: 7.50 K/uL    Auto Lymphocyte #: 1.51 K/uL    Auto Monocyte #: 1.23 K/uL    Auto Eosinophil #: 0.27 K/uL    Auto Basophil #: 0.04 K/uL    Auto Immature Granulocyte #: 0.09: (Includes meta, myelo and promyelocytes) #    Auto Neutrophil %: 70.5 %    Auto Lymphocyte %: 14.2 %    Auto Monocyte %: 11.6 %    Auto Eosinophil %: 2.5 %    Auto Basophil %: 0.4 %    Auto Immature Granulocyte %: 0.8: (Includes meta, myelo and promyelocytes) %    < from: Xray Chest 2 Views PA/Lat (12.24.18 @ 13:32) >  IMPRESSION:  Underinflated lungs with resultant crowding and accentuation of   parenchymal markings limiting evaluation for mild interstitial edema.    No focal patchy airspace consolidations, pleural effusions, or   pneumothorax.    Stable left chest wall dual-lead pacemaker and cardiac and mediastinal   silhouettes.    Generalized osteopenia and spinal dextrocurvature with degenerative   change again noted.

## 2018-12-27 NOTE — DISCHARGE NOTE ADULT - PLAN OF CARE
treat infection In the hospital you were found to have a urinary tract infection, or an infection in your urine. You were treated with antibiotics for 3 days for this infection. You have a history of recurrent urinary tract infections so you must continue to take your methenamine at home. You must follow up with Dr. Ermelinda Hill [phone] your primary care Doctor for continued management of your recurrent UTIs, you may consider getting a referral to a urology doctor for further management if desired.

## 2018-12-27 NOTE — PROGRESS NOTE ADULT - ASSESSMENT
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

## 2018-12-27 NOTE — DISCHARGE NOTE ADULT - CARE PROVIDERS DIRECT ADDRESSES
[email or web address]

## 2018-12-28 VITALS
RESPIRATION RATE: 18 BRPM | OXYGEN SATURATION: 99 % | DIASTOLIC BLOOD PRESSURE: 65 MMHG | HEART RATE: 66 BPM | TEMPERATURE: 98 F | SYSTOLIC BLOOD PRESSURE: 119 MMHG

## 2018-12-28 LAB
-  AMIKACIN: SIGNIFICANT CHANGE UP
-  AMPICILLIN/SULBACTAM: SIGNIFICANT CHANGE UP
-  AMPICILLIN: SIGNIFICANT CHANGE UP
-  AZTREONAM: SIGNIFICANT CHANGE UP
-  CEFAZOLIN: SIGNIFICANT CHANGE UP
-  CEFEPIME: SIGNIFICANT CHANGE UP
-  CEFOXITIN: SIGNIFICANT CHANGE UP
-  CEFTAZIDIME: SIGNIFICANT CHANGE UP
-  CEFTRIAXONE: SIGNIFICANT CHANGE UP
-  CIPROFLOXACIN: SIGNIFICANT CHANGE UP
-  ERTAPENEM: SIGNIFICANT CHANGE UP
-  GENTAMICIN: SIGNIFICANT CHANGE UP
-  LEVOFLOXACIN: SIGNIFICANT CHANGE UP
-  MEROPENEM: SIGNIFICANT CHANGE UP
-  NITROFURANTOIN: SIGNIFICANT CHANGE UP
-  PIPERACILLIN/TAZOBACTAM: SIGNIFICANT CHANGE UP
-  TOBRAMYCIN: SIGNIFICANT CHANGE UP
-  TRIMETHOPRIM/SULFAMETHOXAZOLE: SIGNIFICANT CHANGE UP
BACTERIA UR CULT: SIGNIFICANT CHANGE UP
METHOD TYPE: SIGNIFICANT CHANGE UP
ORGANISM # SPEC MICROSCOPIC CNT: SIGNIFICANT CHANGE UP

## 2018-12-28 PROCEDURE — 99239 HOSP IP/OBS DSCHRG MGMT >30: CPT

## 2018-12-28 RX ADMIN — Medication 81 MILLIGRAM(S): at 11:11

## 2018-12-28 RX ADMIN — PREGABALIN 1000 MICROGRAM(S): 225 CAPSULE ORAL at 11:11

## 2018-12-28 RX ADMIN — Medication 1 TABLET(S): at 11:11

## 2018-12-28 RX ADMIN — AMIODARONE HYDROCHLORIDE 100 MILLIGRAM(S): 400 TABLET ORAL at 06:23

## 2018-12-28 RX ADMIN — NYSTATIN CREAM 1 APPLICATION(S): 100000 CREAM TOPICAL at 06:23

## 2018-12-28 RX ADMIN — Medication 100 MICROGRAM(S): at 06:23

## 2018-12-28 NOTE — PROGRESS NOTE ADULT - PROBLEM SELECTOR PLAN 3
Patient is s/p PPM and is amiodarone.  - c/w amiodarone 100 mg daily

## 2018-12-28 NOTE — PROGRESS NOTE ADULT - PROBLEM SELECTOR PLAN 1
UA w/ large LE and +nitrites. Patient does have a h/o frequent UTI's and is on methenamine at home.   - s/p 1g ceftriaxone in ED  - s/p 3 day course ceftriaxone  - urine cx not collected in ED, resent today, contacted PCP Dr. Ermelinda Hill but  was unable to find prior urine culture UA w/ large LE and +nitrites. Patient does have a h/o frequent UTI's and is on methenamine at home.   - s/p 1g ceftriaxone in ED  - s/p 3 day course ceftriaxone  - urine cx not collected in ED, resent today, contacted PCP Dr. Ermelinda Hill but  was unable to find prior urine culture  - repeat Cx shows low CFU ESBL proteus mirabilis however patient clinically improved, therefore she is likely colonized with this and had routine bug infection that improved s/p Rx with ceftriaxone

## 2018-12-28 NOTE — PROGRESS NOTE ADULT - PROBLEM SELECTOR PLAN 5
- c/w synthroid 100 mcg daily

## 2018-12-28 NOTE — PROGRESS NOTE ADULT - ATTENDING COMMENTS
Patient seen and examined at bedside. Feeling well and excited to go to [facility]. Urine culture returned as ESBL positive for proteus. However, patient symptomatically improved with treatment with ceftriaxone and leukocytosis also resolved with treatment with ceftriaxone. Urine culture was taken after patient already received 2 days of abx. Given patient's clinical improvement, lab improvement, and that urine culture was taken after 2 days of abx, I feel the Proteus on the culture is likely colonization and not active infection. I suspect that the organism that was causing the UTI in the patient was sensitive to ceftriaxone (hence, not on urine culture) and she has received appropriate treatment. She is stable for dc to [facility].    TIME SPENT ON DISCHARGE PLANNINmins
Patient seen and examined at bedside. Offers no complaints. Now s/p 3 days of IV ceftriaxone for presumed acute cystitis. Patient is medically stable for dc but PT now recommending RUDY. Await RUDY placement and insurance authorization. Urine culture growing GNR.
Patient seen and examined at bedside with HHA present. Per HHA, patient appears back at her baseline mental status. Patient denies any complaints-no dysuria, abdominal pain, CP, SOB, palpitations. She would like to go home. 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 Patient currently improving, appears at baseline per HHA. Will complete 3 day course of ceftriaxone with dc home thereafter. Unfortunately, no urine culture has been sent so will unable to tailor antibiotics to exact organism. Review of prior chart records does not reveal any positive urine cultures. Team called PCP but they do not have record of any urine cultures either. Dc planning for AM.

## 2018-12-28 NOTE — PROGRESS NOTE ADULT - PROBLEM SELECTOR PROBLEM 4
Heart failure with preserved ejection fraction

## 2018-12-28 NOTE — PROGRESS NOTE ADULT - PROBLEM SELECTOR PLAN 6
- c/w ASA 81 and Atorvastatin 10mg daily

## 2018-12-28 NOTE — PROGRESS NOTE ADULT - ASSESSMENT
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

## 2018-12-28 NOTE — PROGRESS NOTE ADULT - SUBJECTIVE AND OBJECTIVE BOX
Manpreet Boykin MD PGY1   Pager: [phone] ADRIA, [phone] [facility]  After 7: Night Float pager    Patient is a 89y old  Female who presents with a chief complaint of UTI, weakness (27 Dec 2018 11:23)      SUBJECTIVE / OVERNIGHT EVENTS: Overnight, no acute events. Patient seen and examined at bedside this aM, denying acute complaints.     MEDICATIONS  (STANDING):  amiodarone    Tablet 100 milliGRAM(s) Oral daily  aspirin  chewable 81 milliGRAM(s) Oral daily  atorvastatin 10 milliGRAM(s) Oral at bedtime  cyanocobalamin 1000 MICROGram(s) Oral daily  enoxaparin Injectable 40 milliGRAM(s) SubCutaneous every 24 hours  furosemide    Tablet 20 milliGRAM(s) Oral <User Schedule>  levothyroxine 100 MICROGram(s) Oral daily  multivitamin 1 Tablet(s) Oral daily  nystatin Powder 1 Application(s) Topical two times a day  QUEtiapine 25 milliGRAM(s) Oral <User Schedule>  spironolactone 12.5 milliGRAM(s) Oral <User Schedule>    MEDICATIONS  (PRN):  LORazepam     Tablet 0.5 milliGRAM(s) Oral at bedtime PRN Agitation  polyethylene glycol 3350 17 Gram(s) Oral daily PRN Constipation      Vital Signs Last 24 Hrs  T(C): 36.7 (28 Dec 2018 06:20), Max: 36.8 (27 Dec 2018 21:55)  T(F): 98 (28 Dec 2018 06:20), Max: 98.3 (27 Dec 2018 21:55)  HR: 62 (28 Dec 2018 06:20) (60 - 62)  BP: 122/61 (28 Dec 2018 06:20) (100/56 - 122/61)  BP(mean): --  RR: 18 (28 Dec 2018 06:20) (18 - 20)  SpO2: 99% (28 Dec 2018 06:20) (98% - 99%)  CAPILLARY BLOOD GLUCOSE        I&O's Summary    27 Dec 2018 07:01  -  28 Dec 2018 07:00  --------------------------------------------------------  IN: 240 mL / OUT: 230 mL / NET: 10 mL        PHYSICAL EXAM:  GENERAL: elderly female resting comfortably in bed   EYES: EOMI, PERRLA, conjunctiva and sclera clear  NECK:  No JVD  CHEST/LUNG: CTABL ; No wheeze  HEART: AS murmur  ABDOMEN: Soft, Nontender, Nondistended; Bowel sounds present  : No Bobby  EXTREMITIES:  2+ Peripheral Pulses, No edema  PSYCH: AAOx3  NEUROLOGY: non-focal  SKIN: No rashes or lesions. No sacral ulcer    LABS:                    Microbiology;        RADIOLOGY & ADDITIONAL TESTS:    Imaging Personally Reviewed: Manpreet Morris MD PGY1   Pager: [phone] ADRIA, [phone] [facility]  After 7: Night Float pager    Patient is a 89y old  Female who presents with a chief complaint of UTI, weakness (27 Dec 2018 11:23)      SUBJECTIVE / OVERNIGHT EVENTS: Overnight, no acute events. Patient seen and examined at bedside this aM, denying acute complaints.     MEDICATIONS  (STANDING):  amiodarone    Tablet 100 milliGRAM(s) Oral daily  aspirin  chewable 81 milliGRAM(s) Oral daily  atorvastatin 10 milliGRAM(s) Oral at bedtime  cyanocobalamin 1000 MICROGram(s) Oral daily  enoxaparin Injectable 40 milliGRAM(s) SubCutaneous every 24 hours  furosemide    Tablet 20 milliGRAM(s) Oral <User Schedule>  levothyroxine 100 MICROGram(s) Oral daily  multivitamin 1 Tablet(s) Oral daily  nystatin Powder 1 Application(s) Topical two times a day  QUEtiapine 25 milliGRAM(s) Oral <User Schedule>  spironolactone 12.5 milliGRAM(s) Oral <User Schedule>    MEDICATIONS  (PRN):  LORazepam     Tablet 0.5 milliGRAM(s) Oral at bedtime PRN Agitation  polyethylene glycol 3350 17 Gram(s) Oral daily PRN Constipation      Vital Signs Last 24 Hrs  T(C): 36.7 (28 Dec 2018 06:20), Max: 36.8 (27 Dec 2018 21:55)  T(F): 98 (28 Dec 2018 06:20), Max: 98.3 (27 Dec 2018 21:55)  HR: 62 (28 Dec 2018 06:20) (60 - 62)  BP: 122/61 (28 Dec 2018 06:20) (100/56 - 122/61)  BP(mean): --  RR: 18 (28 Dec 2018 06:20) (18 - 20)  SpO2: 99% (28 Dec 2018 06:20) (98% - 99%)  CAPILLARY BLOOD GLUCOSE        I&O's Summary    27 Dec 2018 07:01  -  28 Dec 2018 07:00  --------------------------------------------------------  IN: 240 mL / OUT: 230 mL / NET: 10 mL        PHYSICAL EXAM:  GENERAL: elderly female resting comfortably in bed   EYES: EOMI, PERRLA, conjunctiva and sclera clear  NECK:  No JVD  CHEST/LUNG: CTABL ; No wheeze  HEART: AS murmur  ABDOMEN: Soft, Nontender, Nondistended; Bowel sounds present  : No Bobby  EXTREMITIES:  2+ Peripheral Pulses, No edema  PSYCH: AAOx3  NEUROLOGY: non-focal  SKIN: No rashes or lesions. No sacral ulcer    LABS:                    Microbiology;  Culture - Urine (12.26.18 @ 09:59)    -  Amikacin: S <=8 KYA    -  Ampicillin: R >16 KYA    -  Ampicillin/Sulbactam: R 16/8 KYA    -  Aztreonam: R <=4 KYA    -  Cefazolin: R >16 KYA    -  Cefepime: R >16 KYA    -  Cefoxitin: S 8 KYA    -  Ceftazidime: R <=1 KYA    -  Ceftriaxone: R    -  Ciprofloxacin: R >2 KYA    -  Ertapenem: S <=0.5 KYA    -  Gentamicin: R >8 KYA    -  Levofloxacin: R >4 KYA    -  Meropenem: S <=1 KYA    -  Nitrofurantoin: R >64 KYA    -  Piperacillin/Tazobactam: R <=8 KYA    -  Tobramycin: I 8 KYA    -  Trimethoprim/Sulfamethoxazole: R >2/38 KYA    Culture - Urine:   COLONY COUNT: 10,000-49,000 CFU/mL    Culture - Urine:   RESULT CALLED TO / READ BACK: DR MORRIS / Y  DATE / TIME CALLED: 12/28/18 1321  CALLED BY: LEONARDO FLOREZ    Specimen Source: URINE CATHETER    Organism Identification: P.MIRABILIS ESBL POSITIIVE    Organism: P.MIRABILIS ESBL POSITIIVE  COLONY COUNT: 10,000-49,000 CFU/mL    Method Type: NEGATIVE KYA 43          RADIOLOGY & ADDITIONAL TESTS:    Imaging Personally Reviewed:

## 2019-01-04 ENCOUNTER — INBOUND DOCUMENT (OUTPATIENT)
Age: 84
End: 2019-01-04

## 2019-01-04 ENCOUNTER — APPOINTMENT (OUTPATIENT)
Dept: UROLOGY | Facility: CLINIC | Age: 84
End: 2019-01-04

## 2019-01-20 ENCOUNTER — INPATIENT (INPATIENT)
Facility: HOSPITAL | Age: 84
LOS: 14 days | Discharge: NURSING, REHAB OR LONG-TERM CARE | End: 2019-02-04
Attending: HOSPITALIST | Admitting: HOSPITALIST
Payer: COMMERCIAL

## 2019-01-20 VITALS
DIASTOLIC BLOOD PRESSURE: 65 MMHG | SYSTOLIC BLOOD PRESSURE: 102 MMHG | OXYGEN SATURATION: 100 % | RESPIRATION RATE: 16 BRPM | HEART RATE: 60 BPM | TEMPERATURE: 98 F

## 2019-01-20 DIAGNOSIS — Z95.828 PRESENCE OF OTHER VASCULAR IMPLANTS AND GRAFTS: Chronic | ICD-10-CM

## 2019-01-20 DIAGNOSIS — K62.89 OTHER SPECIFIED DISEASES OF ANUS AND RECTUM: ICD-10-CM

## 2019-01-20 LAB
ALBUMIN SERPL ELPH-MCNC: 2.9 G/DL — LOW (ref 3.3–5)
ALP SERPL-CCNC: 100 U/L — SIGNIFICANT CHANGE UP (ref 40–120)
ALT FLD-CCNC: 24 U/L — SIGNIFICANT CHANGE UP (ref 4–33)
ANION GAP SERPL CALC-SCNC: 14 MMO/L — SIGNIFICANT CHANGE UP (ref 7–14)
APPEARANCE UR: CLEAR — SIGNIFICANT CHANGE UP
AST SERPL-CCNC: 19 U/L — SIGNIFICANT CHANGE UP (ref 4–32)
BACTERIA # UR AUTO: NEGATIVE — SIGNIFICANT CHANGE UP
BASE EXCESS BLDV CALC-SCNC: 4.9 MMOL/L — SIGNIFICANT CHANGE UP
BASOPHILS # BLD AUTO: 0.03 K/UL — SIGNIFICANT CHANGE UP (ref 0–0.2)
BASOPHILS NFR BLD AUTO: 0.2 % — SIGNIFICANT CHANGE UP (ref 0–2)
BILIRUB SERPL-MCNC: 0.9 MG/DL — SIGNIFICANT CHANGE UP (ref 0.2–1.2)
BILIRUB UR-MCNC: NEGATIVE — SIGNIFICANT CHANGE UP
BLOOD GAS VENOUS - CREATININE: 0.75 MG/DL — SIGNIFICANT CHANGE UP (ref 0.5–1.3)
BLOOD UR QL VISUAL: NEGATIVE — SIGNIFICANT CHANGE UP
BUN SERPL-MCNC: 24 MG/DL — HIGH (ref 7–23)
C DIFF TOX GENS STL QL NAA+PROBE: SIGNIFICANT CHANGE UP
CALCIUM SERPL-MCNC: 8.3 MG/DL — LOW (ref 8.4–10.5)
CHLORIDE BLDV-SCNC: 105 MMOL/L — SIGNIFICANT CHANGE UP (ref 96–108)
CHLORIDE SERPL-SCNC: 101 MMOL/L — SIGNIFICANT CHANGE UP (ref 98–107)
CO2 SERPL-SCNC: 25 MMOL/L — SIGNIFICANT CHANGE UP (ref 22–31)
COLOR SPEC: YELLOW — SIGNIFICANT CHANGE UP
CREAT SERPL-MCNC: 0.8 MG/DL — SIGNIFICANT CHANGE UP (ref 0.5–1.3)
EOSINOPHIL # BLD AUTO: 0.15 K/UL — SIGNIFICANT CHANGE UP (ref 0–0.5)
EOSINOPHIL NFR BLD AUTO: 1 % — SIGNIFICANT CHANGE UP (ref 0–6)
GAS PNL BLDV: 136 MMOL/L — SIGNIFICANT CHANGE UP (ref 136–146)
GLUCOSE BLDV-MCNC: 104 — HIGH (ref 70–99)
GLUCOSE SERPL-MCNC: 102 MG/DL — HIGH (ref 70–99)
GLUCOSE UR-MCNC: NEGATIVE — SIGNIFICANT CHANGE UP
HCO3 BLDV-SCNC: 27 MMOL/L — SIGNIFICANT CHANGE UP (ref 20–27)
HCT VFR BLD CALC: 32.4 % — LOW (ref 34.5–45)
HCT VFR BLDV CALC: 31.7 % — LOW (ref 34.5–45)
HGB BLD-MCNC: 9.9 G/DL — LOW (ref 11.5–15.5)
HGB BLDV-MCNC: 10.3 G/DL — LOW (ref 11.5–15.5)
HYALINE CASTS # UR AUTO: NEGATIVE — SIGNIFICANT CHANGE UP
IMM GRANULOCYTES NFR BLD AUTO: 1.2 % — SIGNIFICANT CHANGE UP (ref 0–1.5)
KETONES UR-MCNC: NEGATIVE — SIGNIFICANT CHANGE UP
LACTATE BLDV-MCNC: 1.2 MMOL/L — SIGNIFICANT CHANGE UP (ref 0.5–2)
LEUKOCYTE ESTERASE UR-ACNC: NEGATIVE — SIGNIFICANT CHANGE UP
LIDOCAIN IGE QN: 14.6 U/L — SIGNIFICANT CHANGE UP (ref 7–60)
LIDOCAIN IGE QN: 14.9 U/L — SIGNIFICANT CHANGE UP (ref 7–60)
LYMPHOCYTES # BLD AUTO: 1.4 K/UL — SIGNIFICANT CHANGE UP (ref 1–3.3)
LYMPHOCYTES # BLD AUTO: 9.5 % — LOW (ref 13–44)
MAGNESIUM SERPL-MCNC: 2 MG/DL — SIGNIFICANT CHANGE UP (ref 1.6–2.6)
MAGNESIUM SERPL-MCNC: 2.3 MG/DL — SIGNIFICANT CHANGE UP (ref 1.6–2.6)
MCHC RBC-ENTMCNC: 30.6 % — LOW (ref 32–36)
MCHC RBC-ENTMCNC: 30.8 PG — SIGNIFICANT CHANGE UP (ref 27–34)
MCV RBC AUTO: 100.9 FL — HIGH (ref 80–100)
MONOCYTES # BLD AUTO: 1.56 K/UL — HIGH (ref 0–0.9)
MONOCYTES NFR BLD AUTO: 10.6 % — SIGNIFICANT CHANGE UP (ref 2–14)
NEUTROPHILS # BLD AUTO: 11.36 K/UL — HIGH (ref 1.8–7.4)
NEUTROPHILS NFR BLD AUTO: 77.5 % — HIGH (ref 43–77)
NITRITE UR-MCNC: NEGATIVE — SIGNIFICANT CHANGE UP
NRBC # FLD: 0 K/UL — LOW (ref 25–125)
NT-PROBNP SERPL-SCNC: 3286 PG/ML — SIGNIFICANT CHANGE UP
PCO2 BLDV: 50 MMHG — SIGNIFICANT CHANGE UP (ref 41–51)
PH BLDV: 7.39 PH — SIGNIFICANT CHANGE UP (ref 7.32–7.43)
PH UR: 6.5 — SIGNIFICANT CHANGE UP (ref 5–8)
PHOSPHATE SERPL-MCNC: 3.3 MG/DL — SIGNIFICANT CHANGE UP (ref 2.5–4.5)
PHOSPHATE SERPL-MCNC: 3.5 MG/DL — SIGNIFICANT CHANGE UP (ref 2.5–4.5)
PLATELET # BLD AUTO: 512 K/UL — HIGH (ref 150–400)
PMV BLD: 9.5 FL — SIGNIFICANT CHANGE UP (ref 7–13)
PO2 BLDV: < 24 MMHG — LOW (ref 35–40)
POTASSIUM BLDV-SCNC: 4.1 MMOL/L — SIGNIFICANT CHANGE UP (ref 3.4–4.5)
POTASSIUM SERPL-MCNC: 4.4 MMOL/L — SIGNIFICANT CHANGE UP (ref 3.5–5.3)
POTASSIUM SERPL-SCNC: 4.4 MMOL/L — SIGNIFICANT CHANGE UP (ref 3.5–5.3)
PROT SERPL-MCNC: 6.8 G/DL — SIGNIFICANT CHANGE UP (ref 6–8.3)
PROT UR-MCNC: 20 — SIGNIFICANT CHANGE UP
RBC # BLD: 3.21 M/UL — LOW (ref 3.8–5.2)
RBC # FLD: 14.7 % — HIGH (ref 10.3–14.5)
RBC CASTS # UR COMP ASSIST: SIGNIFICANT CHANGE UP (ref 0–?)
SAO2 % BLDV: 28.9 % — LOW (ref 60–85)
SODIUM SERPL-SCNC: 140 MMOL/L — SIGNIFICANT CHANGE UP (ref 135–145)
SP GR SPEC: 1.03 — SIGNIFICANT CHANGE UP (ref 1–1.04)
SQUAMOUS # UR AUTO: SIGNIFICANT CHANGE UP
TROPONIN T, HIGH SENSITIVITY: 40 NG/L — SIGNIFICANT CHANGE UP (ref ?–14)
UROBILINOGEN FLD QL: NORMAL — SIGNIFICANT CHANGE UP
WBC # BLD: 14.68 K/UL — HIGH (ref 3.8–10.5)
WBC # FLD AUTO: 14.68 K/UL — HIGH (ref 3.8–10.5)
WBC UR QL: SIGNIFICANT CHANGE UP (ref 0–?)

## 2019-01-20 PROCEDURE — 74177 CT ABD & PELVIS W/CONTRAST: CPT | Mod: 26

## 2019-01-20 PROCEDURE — 99223 1ST HOSP IP/OBS HIGH 75: CPT

## 2019-01-20 PROCEDURE — 71045 X-RAY EXAM CHEST 1 VIEW: CPT | Mod: 26

## 2019-01-20 RX ORDER — SODIUM CHLORIDE 9 MG/ML
1000 INJECTION INTRAMUSCULAR; INTRAVENOUS; SUBCUTANEOUS ONCE
Qty: 0 | Refills: 0 | Status: COMPLETED | OUTPATIENT
Start: 2019-01-20 | End: 2019-01-20

## 2019-01-20 RX ORDER — CIPROFLOXACIN LACTATE 400MG/40ML
400 VIAL (ML) INTRAVENOUS ONCE
Qty: 0 | Refills: 0 | Status: COMPLETED | OUTPATIENT
Start: 2019-01-20 | End: 2019-01-20

## 2019-01-20 RX ORDER — METRONIDAZOLE 500 MG
500 TABLET ORAL ONCE
Qty: 0 | Refills: 0 | Status: COMPLETED | OUTPATIENT
Start: 2019-01-20 | End: 2019-01-20

## 2019-01-20 RX ADMIN — SODIUM CHLORIDE 2000 MILLILITER(S): 9 INJECTION INTRAMUSCULAR; INTRAVENOUS; SUBCUTANEOUS at 14:25

## 2019-01-20 RX ADMIN — Medication 100 MILLIGRAM(S): at 21:03

## 2019-01-20 RX ADMIN — Medication 500 MILLIGRAM(S): at 22:38

## 2019-01-20 RX ADMIN — Medication 200 MILLIGRAM(S): at 23:29

## 2019-01-20 NOTE — ED PROVIDER NOTE - PROGRESS NOTE DETAILS
[facility] pt noted to have crackles diffusely. Fluid bolus terminated. No edema noted to lower extremities. Pt had loose small BM in diaper. Abd nontender, benign. Will obtain CT abd/pelvis. Lab called stating that the original stool sample cannot be used since it contained water in it so a new sample will have to be sent. Nurse has been informed  ~Floyd Carrillo M.D. Resident

## 2019-01-20 NOTE — ED PROVIDER NOTE - PHYSICAL EXAMINATION
Gen: AAOx3, appear   Head: NCAT  HEENT: EOMI, oral mucosa moist, normal conjunctiva  Lung: CTAB, no respiratory distress, no wheezes/rhonchi/rales B/L, speaking in full sentences  CV: RRR, no murmurs, rubs or gallops  Abd: soft, NTND, no guarding, no CVA tenderness  MSK: no visible deformities  Neuro: No focal sensory or motor deficits  Skin: Warm, well perfused, no rash  Psych: normal affect.   ~Floyd Carrillo M.D. Resident Gen: AAOx2,   Head: NCAT  HEENT: EOMI, oral mucosa moist, normal conjunctiva  Lung: b/l crackles, no respiratory distress  CV: RRR, no murmurs, rubs or gallops  Abd: soft, NTND, no guarding  MSK: no visible deformities, strength 5/5   Neuro: No focal sensory or motor deficits  ~Floyd Carrillo M.D. Resident

## 2019-01-20 NOTE — ED ADULT NURSE NOTE - OBJECTIVE STATEMENT
pt is in bed A and O x1 unaware of hospital location and date. hx provided by family members. as per son, " for the last 3 days she is having difficulty bearing her own weight and is not drinking or eating anything, she feels very weak". pt presents with o2 NC at 2 LPM as per family O2 administered by EMS " and she started looking better" after the interventions. pt c/o rectal pain  during personal care abd abd pain, BS present 4 quadrants bad soft non distended BS present 4 quadrants. orders noted and completed.

## 2019-01-20 NOTE — ED PROVIDER NOTE - ATTENDING CONTRIBUTION TO CARE
I, Jennifer Cabot, MD, have performed a history and physical exam of the patient and discussed their management with the resident. I reviewed the resident's note and agree with the documented findings and plan of care. My medical decision making and observations are found above.    Cabot: 89F with PMH of hypothyroidism, tachy-kevin syndrome,  HFpEF (mild diastolic dysfunction 2016), dementia (AOx2 at baseline), macular degeneration, hemorrhagic CVA, recurrent UTI's, b/l DVT s/p IVC filter (off AC since 2014 following CVA) who comes in with 1 week of FTT and 2d of copious watery diarrhea, non bloody after receiving CTX for a UTI in a recent admission.  No F/C/N/V/urinary sx.  HDS, NAD, MMM, eyes clear, lungs CTAB, heart sounds normal, abd soft, NT, ND, no CVAT, LEs without edema, wwp, skin normal temperature and color, neuro: awake, alert, no focal deficits.  Will check basic labs, lytes, C diff, O+P, admit for hydration and FTT.

## 2019-01-20 NOTE — ED ADULT NURSE NOTE - IS THE PATIENT ABLE TO BE SCREENED?
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) (N60 29)   4  Macromastia (611 1) (N62)   5  Nipple discharge (611 79) (N64 52)   6  Nipple Discharge Bilateral (611 79)    Past Medical History    · History of anemia (V12 3) (Z86 2)   · History of asthma (V12 69) (Z87 09)    Surgical History    · History of Breast Surgery Lumpectomy   · History of Complete Colonoscopy   · History of Diagnostic Esophagogastroduodenoscopy   · History of Hysterectomy   · History of Laparoscopic Aspiration Left Ovary    The surgical history was reviewed and updated today  Family History    · Family history of Diabetes Mellitus (V18 0)    · Family history of Breast Cancer (V16 3)    The family history was reviewed and updated today  Social History    · Being A Social Drinker   · Never A Smoker  The social history was reviewed and updated today  The social history was reviewed and is unchanged  Current Meds   1  Phentermine HCl - 37 5 MG Oral Tablet; Therapy: (Recorded:[id number]) to Recorded   2  Ventolin  (90 Base) MCG/ACT Inhalation Aerosol Solution; INHALE 1 TO 2 PUFFS   EVERY 4 TO 6 HOURS AS NEEDED; Therapy: (Recorded:[id number]) to Recorded    The medication list was reviewed and updated today  Allergies    1   No Known Drug Allergies    Physical Exam    Constitutional   General appearance: No acute distress, well appearing and
[place]
well nourished  Skin   Skin and subcutaneous tissue: Abnormal   Bilateral breasts are soft, supple and without palpable lumps bilaterally  Her scars are healing well  She reports having in nipple sensation on the right side but not on the left  Photos were taken  Psychiatric   Orientation to person, place, and time: Normal     Mood and affect: Normal        Assessment    1  Bilateral fibrocystic breast changes (610 1) (N60 11,N60 12)   2  Aftercare involving the use of plastic surgery (V51 8) (Z09)    Discussion/Summary    John Lovett is a pleasant 72-year-old female who is 4 months status post bilateral reduction mammoplasty  Please see HPI  Believe her pain is secondary to fibrocystic breast disease in conjunction with postsurgical healing and scarring  I have recommended that she cut down on caffeine intake and that she can take ibuprofen for the discomfort as well  She is due for mammogram this spring  We will see her back in 3 months  She is encouraged to follow up sooner with any concerns  Thank you very much for allowing me to participate in the care of this patient  If you have any questions, please do not hesitate to contact me        Signatures   Electronically signed by : CHARY Araiza; Jan 9 2018 11:01AM EST                       (Author)    Electronically signed by : ANTOINETTE Ybarra ; Jan 12 2018 12:39PM EST
No

## 2019-01-20 NOTE — ED PROVIDER NOTE - OBJECTIVE STATEMENT
89 y/o F w/ a PMH significant for hypothyroidism, tachy-kevin syndrome,  HFpEF (mild diastolic dysfunction 2016), dementia (AOx2 at baseline), macular degeneration, hemorrhagic CVA, recurrent UTI's, b/l DVT s/p IVC filter (off AC since 2014 following CVA) presents with diarrhea (3days) and generalized weakness and decreased po intake (~1 week). Patient was recently admitted for UTI and weakness (12/24/18) treated with Ceftriaxone and rehab.   Per son: Patient received an enema and laxatives 3 days ago (same day of diarrhea onset) 89 y/o F w/ a PMH significant for hypothyroidism, tachy-kevin syndrome,  HFpEF (mild diastolic dysfunction 2016), dementia (AOx2 at baseline), macular degeneration, hemorrhagic CVA, recurrent UTI's, b/l DVT s/p IVC filter (off AC since 2014 following CVA) presents with diarrhea (3days) and generalized weakness and decreased po intake (~1 week). Patient was recently admitted for UTI and weakness (12/24/18) treated with Ceftriaxone and rehab.   Per son: Patient received an enema and laxatives 3 days ago (same day of diarrhea onset) with >20 episodes of non bloody diarrhea. No fever, chills, abd pain, n/v chest pain, sob

## 2019-01-20 NOTE — ED PROVIDER NOTE - MEDICAL DECISION MAKING DETAILS
[place]: 89F with PMH of hypothyroidism, tachy-kevin syndrome,  HFpEF (mild diastolic dysfunction 2016), dementia (AOx2 at baseline), macular degeneration, hemorrhagic CVA, recurrent UTI's, b/l DVT s/p IVC filter (off AC since 2014 following CVA) who comes in with 1 week of FTT and 2d of copious watery diarrhea, non bloody after receiving CTX for a UTI in a recent admission.  No F/C/N/V/urinary sx.  HDS, NAD, MMM, eyes clear, lungs CTAB, heart sounds normal, abd soft, NT, ND, no CVAT, LEs without edema, wwp, skin normal temperature and color, neuro: awake, alert, no focal deficits.  Will check basic labs, lytes, C diff, O+P, admit for hydration and FTT.

## 2019-01-20 NOTE — ED ADULT TRIAGE NOTE - CHIEF COMPLAINT QUOTE
pt BIBA from home, pt c/o diarrhea x 3 days with weakness.  pt was recently in the hospital being treated for constipation and started on laxatives

## 2019-01-20 NOTE — ED PROVIDER NOTE - NS ED ROS FT
GENERAL: Generalized weakness, EYES: no change in vision, HEENT: no trouble swallowing or speaking, CARDIAC: no chest pain, PULMONARY: no cough or SOB, GI: no abdominal pain, no nausea, no vomiting, +diarrhea, no constipation, : No changes in urination, SKIN: no rashes, NEURO: no headache,  MSK: No joint pain ~Floyd Carrillo M.D. Resident

## 2019-01-20 NOTE — ED ADULT NURSE REASSESSMENT NOTE - NS ED NURSE REASSESS COMMENT FT1
[facility]. Pt in NAD, Pt on cardiac monitor showing NSR.
Report received from Andrzej GUTHRIE. Pt family at bedside with MOLST form. Pt is DNR, DNI. Pt awaiting CT results. Pt placed on contact precaution pending Cdiff results.
Pt is AOX2. Pt straight cath for urine. Urinary output 600 ml. Pt repeat troponin sent. Pt awaiting bed assignment on tele, will continue to monitor.

## 2019-01-21 DIAGNOSIS — D49.519 NEOPLASM OF UNSPECIFIED BEHAVIOR OF UNSPECIFIED KIDNEY: ICD-10-CM

## 2019-01-21 DIAGNOSIS — Z29.9 ENCOUNTER FOR PROPHYLACTIC MEASURES, UNSPECIFIED: ICD-10-CM

## 2019-01-21 DIAGNOSIS — K62.89 OTHER SPECIFIED DISEASES OF ANUS AND RECTUM: ICD-10-CM

## 2019-01-21 DIAGNOSIS — I82.409 ACUTE EMBOLISM AND THROMBOSIS OF UNSPECIFIED DEEP VEINS OF UNSPECIFIED LOWER EXTREMITY: ICD-10-CM

## 2019-01-21 DIAGNOSIS — J90 PLEURAL EFFUSION, NOT ELSEWHERE CLASSIFIED: ICD-10-CM

## 2019-01-21 DIAGNOSIS — Z66 DO NOT RESUSCITATE: ICD-10-CM

## 2019-01-21 DIAGNOSIS — I50.9 HEART FAILURE, UNSPECIFIED: ICD-10-CM

## 2019-01-21 DIAGNOSIS — E03.9 HYPOTHYROIDISM, UNSPECIFIED: ICD-10-CM

## 2019-01-21 DIAGNOSIS — N13.30 UNSPECIFIED HYDRONEPHROSIS: ICD-10-CM

## 2019-01-21 LAB
B PERT DNA SPEC QL NAA+PROBE: NOT DETECTED — SIGNIFICANT CHANGE UP
BASOPHILS # BLD AUTO: 0.04 K/UL — SIGNIFICANT CHANGE UP (ref 0–0.2)
BASOPHILS NFR BLD AUTO: 0.3 % — SIGNIFICANT CHANGE UP (ref 0–2)
C PNEUM DNA SPEC QL NAA+PROBE: NOT DETECTED — SIGNIFICANT CHANGE UP
CHOLEST SERPL-MCNC: 75 MG/DL — LOW (ref 120–199)
CK MB BLD-MCNC: 2.6 NG/ML — SIGNIFICANT CHANGE UP (ref 1–4.7)
CK MB BLD-MCNC: SIGNIFICANT CHANGE UP (ref 0–2.5)
CK SERPL-CCNC: 41 U/L — SIGNIFICANT CHANGE UP (ref 25–170)
EOSINOPHIL # BLD AUTO: 0.09 K/UL — SIGNIFICANT CHANGE UP (ref 0–0.5)
EOSINOPHIL NFR BLD AUTO: 0.6 % — SIGNIFICANT CHANGE UP (ref 0–6)
FLUAV H1 2009 PAND RNA SPEC QL NAA+PROBE: NOT DETECTED — SIGNIFICANT CHANGE UP
FLUAV H1 RNA SPEC QL NAA+PROBE: NOT DETECTED — SIGNIFICANT CHANGE UP
FLUAV H3 RNA SPEC QL NAA+PROBE: NOT DETECTED — SIGNIFICANT CHANGE UP
FLUAV SUBTYP SPEC NAA+PROBE: NOT DETECTED — SIGNIFICANT CHANGE UP
FLUBV RNA SPEC QL NAA+PROBE: NOT DETECTED — SIGNIFICANT CHANGE UP
HADV DNA SPEC QL NAA+PROBE: NOT DETECTED — SIGNIFICANT CHANGE UP
HBA1C BLD-MCNC: 5.1 % — SIGNIFICANT CHANGE UP (ref 4–5.6)
HCOV PNL SPEC NAA+PROBE: DETECTED — HIGH
HCT VFR BLD CALC: 29.5 % — LOW (ref 34.5–45)
HDLC SERPL-MCNC: 39 MG/DL — LOW (ref 45–65)
HGB BLD-MCNC: 8.9 G/DL — LOW (ref 11.5–15.5)
HMPV RNA SPEC QL NAA+PROBE: NOT DETECTED — SIGNIFICANT CHANGE UP
HPIV1 RNA SPEC QL NAA+PROBE: NOT DETECTED — SIGNIFICANT CHANGE UP
HPIV2 RNA SPEC QL NAA+PROBE: NOT DETECTED — SIGNIFICANT CHANGE UP
HPIV3 RNA SPEC QL NAA+PROBE: NOT DETECTED — SIGNIFICANT CHANGE UP
HPIV4 RNA SPEC QL NAA+PROBE: NOT DETECTED — SIGNIFICANT CHANGE UP
IMM GRANULOCYTES NFR BLD AUTO: 1 % — SIGNIFICANT CHANGE UP (ref 0–1.5)
LIPID PNL WITH DIRECT LDL SERPL: 24 MG/DL — SIGNIFICANT CHANGE UP
LYMPHOCYTES # BLD AUTO: 1.2 K/UL — SIGNIFICANT CHANGE UP (ref 1–3.3)
LYMPHOCYTES # BLD AUTO: 8.2 % — LOW (ref 13–44)
MCHC RBC-ENTMCNC: 30.2 % — LOW (ref 32–36)
MCHC RBC-ENTMCNC: 30.5 PG — SIGNIFICANT CHANGE UP (ref 27–34)
MCV RBC AUTO: 101 FL — HIGH (ref 80–100)
MONOCYTES # BLD AUTO: 1.4 K/UL — HIGH (ref 0–0.9)
MONOCYTES NFR BLD AUTO: 9.5 % — SIGNIFICANT CHANGE UP (ref 2–14)
NEUTROPHILS # BLD AUTO: 11.83 K/UL — HIGH (ref 1.8–7.4)
NEUTROPHILS NFR BLD AUTO: 80.4 % — HIGH (ref 43–77)
NRBC # FLD: 0 K/UL — LOW (ref 25–125)
PLATELET # BLD AUTO: 495 K/UL — HIGH (ref 150–400)
PMV BLD: 10 FL — SIGNIFICANT CHANGE UP (ref 7–13)
RBC # BLD: 2.92 M/UL — LOW (ref 3.8–5.2)
RBC # FLD: 15 % — HIGH (ref 10.3–14.5)
RSV RNA SPEC QL NAA+PROBE: NOT DETECTED — SIGNIFICANT CHANGE UP
RV+EV RNA SPEC QL NAA+PROBE: NOT DETECTED — SIGNIFICANT CHANGE UP
TRIGL SERPL-MCNC: 69 MG/DL — SIGNIFICANT CHANGE UP (ref 10–149)
TROPONIN T, HIGH SENSITIVITY: 37 NG/L — SIGNIFICANT CHANGE UP (ref ?–14)
TSH SERPL-MCNC: 1.44 UIU/ML — SIGNIFICANT CHANGE UP (ref 0.27–4.2)
WBC # BLD: 14.71 K/UL — HIGH (ref 3.8–10.5)
WBC # FLD AUTO: 14.71 K/UL — HIGH (ref 3.8–10.5)

## 2019-01-21 PROCEDURE — 12345: CPT | Mod: NC

## 2019-01-21 PROCEDURE — 71250 CT THORAX DX C-: CPT | Mod: 26

## 2019-01-21 RX ORDER — QUETIAPINE FUMARATE 200 MG/1
25 TABLET, FILM COATED ORAL EVERY 24 HOURS
Qty: 0 | Refills: 0 | Status: DISCONTINUED | OUTPATIENT
Start: 2019-01-21 | End: 2019-01-23

## 2019-01-21 RX ORDER — ESCITALOPRAM OXALATE 10 MG/1
5 TABLET, FILM COATED ORAL DAILY
Qty: 0 | Refills: 0 | Status: DISCONTINUED | OUTPATIENT
Start: 2019-01-21 | End: 2019-02-04

## 2019-01-21 RX ORDER — SPIRONOLACTONE 25 MG/1
25 TABLET, FILM COATED ORAL
Qty: 0 | Refills: 0 | Status: DISCONTINUED | OUTPATIENT
Start: 2019-01-21 | End: 2019-01-21

## 2019-01-21 RX ORDER — IPRATROPIUM/ALBUTEROL SULFATE 18-103MCG
3 AEROSOL WITH ADAPTER (GRAM) INHALATION EVERY 6 HOURS
Qty: 0 | Refills: 0 | Status: DISCONTINUED | OUTPATIENT
Start: 2019-01-21 | End: 2019-01-22

## 2019-01-21 RX ORDER — FUROSEMIDE 40 MG
40 TABLET ORAL DAILY
Qty: 0 | Refills: 0 | Status: DISCONTINUED | OUTPATIENT
Start: 2019-01-21 | End: 2019-01-22

## 2019-01-21 RX ORDER — ATORVASTATIN CALCIUM 80 MG/1
10 TABLET, FILM COATED ORAL AT BEDTIME
Qty: 0 | Refills: 0 | Status: DISCONTINUED | OUTPATIENT
Start: 2019-01-21 | End: 2019-02-04

## 2019-01-21 RX ORDER — METRONIDAZOLE 500 MG
500 TABLET ORAL EVERY 8 HOURS
Qty: 0 | Refills: 0 | Status: DISCONTINUED | OUTPATIENT
Start: 2019-01-21 | End: 2019-01-21

## 2019-01-21 RX ORDER — AZITHROMYCIN 500 MG/1
500 TABLET, FILM COATED ORAL EVERY 24 HOURS
Qty: 0 | Refills: 0 | Status: DISCONTINUED | OUTPATIENT
Start: 2019-01-22 | End: 2019-01-22

## 2019-01-21 RX ORDER — HEPARIN SODIUM 5000 [USP'U]/ML
5000 INJECTION INTRAVENOUS; SUBCUTANEOUS EVERY 12 HOURS
Qty: 0 | Refills: 0 | Status: DISCONTINUED | OUTPATIENT
Start: 2019-01-21 | End: 2019-02-04

## 2019-01-21 RX ORDER — SPIRONOLACTONE 25 MG/1
12.5 TABLET, FILM COATED ORAL
Qty: 0 | Refills: 0 | Status: DISCONTINUED | OUTPATIENT
Start: 2019-01-21 | End: 2019-02-04

## 2019-01-21 RX ORDER — CEFTRIAXONE 500 MG/1
INJECTION, POWDER, FOR SOLUTION INTRAMUSCULAR; INTRAVENOUS
Qty: 0 | Refills: 0 | Status: DISCONTINUED | OUTPATIENT
Start: 2019-01-21 | End: 2019-01-22

## 2019-01-21 RX ORDER — AMIODARONE HYDROCHLORIDE 400 MG/1
100 TABLET ORAL DAILY
Qty: 0 | Refills: 0 | Status: DISCONTINUED | OUTPATIENT
Start: 2019-01-21 | End: 2019-02-04

## 2019-01-21 RX ORDER — CEFTRIAXONE 500 MG/1
1 INJECTION, POWDER, FOR SOLUTION INTRAMUSCULAR; INTRAVENOUS EVERY 24 HOURS
Qty: 0 | Refills: 0 | Status: DISCONTINUED | OUTPATIENT
Start: 2019-01-22 | End: 2019-01-22

## 2019-01-21 RX ORDER — LANOLIN ALCOHOL/MO/W.PET/CERES
3 CREAM (GRAM) TOPICAL ONCE
Qty: 0 | Refills: 0 | Status: COMPLETED | OUTPATIENT
Start: 2019-01-21 | End: 2019-01-21

## 2019-01-21 RX ORDER — SODIUM CHLORIDE 9 MG/ML
3 INJECTION INTRAMUSCULAR; INTRAVENOUS; SUBCUTANEOUS EVERY 8 HOURS
Qty: 0 | Refills: 0 | Status: DISCONTINUED | OUTPATIENT
Start: 2019-01-21 | End: 2019-02-04

## 2019-01-21 RX ORDER — AZITHROMYCIN 500 MG/1
500 TABLET, FILM COATED ORAL ONCE
Qty: 0 | Refills: 0 | Status: COMPLETED | OUTPATIENT
Start: 2019-01-21 | End: 2019-01-21

## 2019-01-21 RX ORDER — AZITHROMYCIN 500 MG/1
TABLET, FILM COATED ORAL
Qty: 0 | Refills: 0 | Status: DISCONTINUED | OUTPATIENT
Start: 2019-01-21 | End: 2019-01-22

## 2019-01-21 RX ORDER — LEVOTHYROXINE SODIUM 125 MCG
100 TABLET ORAL DAILY
Qty: 0 | Refills: 0 | Status: DISCONTINUED | OUTPATIENT
Start: 2019-01-21 | End: 2019-02-04

## 2019-01-21 RX ORDER — POLYETHYLENE GLYCOL 3350 17 G/17G
17 POWDER, FOR SOLUTION ORAL DAILY
Qty: 0 | Refills: 0 | Status: DISCONTINUED | OUTPATIENT
Start: 2019-01-21 | End: 2019-01-22

## 2019-01-21 RX ORDER — CEFTRIAXONE 500 MG/1
1 INJECTION, POWDER, FOR SOLUTION INTRAMUSCULAR; INTRAVENOUS ONCE
Qty: 0 | Refills: 0 | Status: COMPLETED | OUTPATIENT
Start: 2019-01-21 | End: 2019-01-21

## 2019-01-21 RX ORDER — ASPIRIN/CALCIUM CARB/MAGNESIUM 324 MG
81 TABLET ORAL DAILY
Qty: 0 | Refills: 0 | Status: DISCONTINUED | OUTPATIENT
Start: 2019-01-21 | End: 2019-02-04

## 2019-01-21 RX ADMIN — CEFTRIAXONE 100 GRAM(S): 500 INJECTION, POWDER, FOR SOLUTION INTRAMUSCULAR; INTRAVENOUS at 13:03

## 2019-01-21 RX ADMIN — Medication 100 MICROGRAM(S): at 05:48

## 2019-01-21 RX ADMIN — SODIUM CHLORIDE 3 MILLILITER(S): 9 INJECTION INTRAMUSCULAR; INTRAVENOUS; SUBCUTANEOUS at 05:35

## 2019-01-21 RX ADMIN — ESCITALOPRAM OXALATE 5 MILLIGRAM(S): 10 TABLET, FILM COATED ORAL at 13:08

## 2019-01-21 RX ADMIN — Medication 1 TABLET(S): at 13:08

## 2019-01-21 RX ADMIN — AZITHROMYCIN 250 MILLIGRAM(S): 500 TABLET, FILM COATED ORAL at 13:04

## 2019-01-21 RX ADMIN — SODIUM CHLORIDE 3 MILLILITER(S): 9 INJECTION INTRAMUSCULAR; INTRAVENOUS; SUBCUTANEOUS at 21:00

## 2019-01-21 RX ADMIN — HEPARIN SODIUM 5000 UNIT(S): 5000 INJECTION INTRAVENOUS; SUBCUTANEOUS at 05:35

## 2019-01-21 RX ADMIN — Medication 3 MILLILITER(S): at 13:02

## 2019-01-21 RX ADMIN — HEPARIN SODIUM 5000 UNIT(S): 5000 INJECTION INTRAVENOUS; SUBCUTANEOUS at 19:37

## 2019-01-21 RX ADMIN — Medication 3 MILLIGRAM(S): at 23:02

## 2019-01-21 RX ADMIN — ATORVASTATIN CALCIUM 10 MILLIGRAM(S): 80 TABLET, FILM COATED ORAL at 21:00

## 2019-01-21 RX ADMIN — Medication 3 MILLILITER(S): at 19:37

## 2019-01-21 RX ADMIN — SODIUM CHLORIDE 3 MILLILITER(S): 9 INJECTION INTRAMUSCULAR; INTRAVENOUS; SUBCUTANEOUS at 12:48

## 2019-01-21 RX ADMIN — QUETIAPINE FUMARATE 25 MILLIGRAM(S): 200 TABLET, FILM COATED ORAL at 19:37

## 2019-01-21 RX ADMIN — Medication 81 MILLIGRAM(S): at 13:08

## 2019-01-21 RX ADMIN — Medication 40 MILLIGRAM(S): at 08:23

## 2019-01-21 NOTE — H&P ADULT - PROBLEM SELECTOR PLAN 6
IVC filter Seen on CT abdomen.   Seen in previous imaging. needs to contact family in Am regarding what work up has done for the tumor. - Seen on CT abdomen.   - Seen in previous imaging. Needs to contact family in AM regarding what work up was done for the tumor.

## 2019-01-21 NOTE — H&P ADULT - PROBLEM SELECTOR PLAN 2
Will hold off on abx now  Blood cultures ordered   Monitor for fevers  WIll hold laxatives [senna, linzess and miralax] Likely from diarrhea  - Blood cultures ordered   - Urine legionella ordered  - Will hold laxatives [senna, linzess and miralax]

## 2019-01-21 NOTE — H&P ADULT - PROBLEM SELECTOR PLAN 3
echo ordered  strict I&Os  Fluid restriction  Will try lasix 20 IV x1, then cont lasix as PO  cont aldactone CT abdomen with findings of Mild bilateral hydroureteronephrosis to the level of the bladder  Per RN, patient voided. Will check bladder scan - CT abdomen with findings of Mild bilateral hydroureteronephrosis to the level of the bladder  - Per RN, patient voided. Will check bladder scan. Monitor I&Os. If pt with urinary retention, will need hensley cath.

## 2019-01-21 NOTE — H&P ADULT - CARDIOVASCULAR DETAILS
4/4/2017 2:02 PM 
 
Ms. Briseida Nick [address] [place] [phone] To Dental Consultant, 
 
Briseida Nick is in need of dental care. This patient is obstetrically cleared for dental procedures with the following considerations: 1. Please use an abdominal and thyroid shield when dental x-rays. 2.  Broad spectrum Penicillins or Cephalosporins and Tylenol products my be 
                 used as needed. Tetracyclines, Nsaids and Quinolones are contraindicated  
                 in pregnancy. 3.  Local Anesthesia without Epinepherine is permissible. Sincerely, Ani Piña MD 
 
 positive S1/positive S2

## 2019-01-21 NOTE — H&P ADULT - PROBLEM SELECTOR PLAN 8
hep sc patient with MOLST form in chart, which confirms DNR/DNI - Patient with MOLST form in chart, which confirms DNR/DNI

## 2019-01-21 NOTE — H&P ADULT - NSHPLABSRESULTS_GEN_ALL_CORE
EKG: NSR with sinus arrhythmia 63 BPM, TWI in III, AVF, AVR, Flat T in V1                          9.9    14.68 )-----------( 512      ( 20 Jan 2019 14:10 )             32.4     01-20    140  |  101  |  24<H>  ----------------------------<  102<H>  4.4   |  25  |  0.80    Ca    8.3<L>      20 Jan 2019 14:10  Phos  3.5     01-20  Mg     2.0     01-20    TPro  6.8  /  Alb  2.9<L>  /  TBili  0.9  /  DBili  x   /  AST  19  /  ALT  24  /  AlkPhos  100  01-20    Troponin T, High Sensitivity: 40: ---------------------***PLEASE NOTE***----------------------  Rapid changes upward or downward in high-sensitivity  troponin levels strongly suggest acute myocardial injury.  Hemolysis may falsely lower results. Renal impairment may  increase results.    Normal: <6 - 14 ng/L  Indeterminate: 15 - 51 ng/L  Elevated: >51 ng/L    Please see "[email or web address]" on the [employer] for more information. ng/L (01.20.19 @ 14:10)    < from: CT Abdomen and Pelvis w/ IV Cont (01.20.19 @ 19:06) >    Findings suggestive of proctitis. No bowel obstruction, drainable fluid   collection or intraperitoneal free air.    Mild bilateral hydroureteronephrosis to the level of the bladder.   Nonspecific mild bilateral perinephric stranding. Punctate nonobstructing   right renal stone.     Increased size of the left renal mass since 3/24/2012. Motion   subcentimeter exophytic lesion in the right anterior renal pole, which   can be further characterized on a nonemergent contrast enhanced MRI.    Findings suggestive of pulmonary edema. Left > right pleural effusions   with compressive atelectasis. Recommend clinical correlation to assess   underlying pneumonia.    Additional findings as described.    < end of copied text > EKG: NSR with sinus arrhythmia 63 BPM, TWI in III, AVF, AVR, Flat T in V1                          9.9    14.68 )-----------( 512      ( 20 Jan 2019 14:10 )             32.4     01-20    140  |  101  |  24<H>  ----------------------------<  102<H>  4.4   |  25  |  0.80    Ca    8.3<L>      20 Jan 2019 14:10  Phos  3.5     01-20  Mg     2.0     01-20    TPro  6.8  /  Alb  2.9<L>  /  TBili  0.9  /  DBili  x   /  AST  19  /  ALT  24  /  AlkPhos  100  01-20    Troponin T, High Sensitivity: 40: ---------------------***PLEASE NOTE***----------------------  Rapid changes upward or downward in high-sensitivity  troponin levels strongly suggest acute myocardial injury.  Hemolysis may falsely lower results. Renal impairment may  increase results.    Normal: <6 - 14 ng/L  Indeterminate: 15 - 51 ng/L  Elevated: >51 ng/L    Please see "[email or web address]" on the [employer] for more information. ng/L (01.20.19 @ 14:10)    Imaging personally reviewed.  < from: CT Abdomen and Pelvis w/ IV Cont (01.20.19 @ 19:06) >    Findings suggestive of proctitis. No bowel obstruction, drainable fluid   collection or intraperitoneal free air.    Mild bilateral hydroureteronephrosis to the level of the bladder.   Nonspecific mild bilateral perinephric stranding. Punctate nonobstructing   right renal stone.     Increased size of the left renal mass since 3/24/2012. Motion   subcentimeter exophytic lesion in the right anterior renal pole, which   can be further characterized on a nonemergent contrast enhanced MRI.    Findings suggestive of pulmonary edema. Left > right pleural effusions   with compressive atelectasis. Recommend clinical correlation to assess   underlying pneumonia.    Additional findings as described.    < end of copied text >

## 2019-01-21 NOTE — PATIENT PROFILE ADULT - TOBACCO USE
"Uli Champagne is a 79 year old male who presents for:  Chief Complaint   Patient presents with     Neurologic Problem     MRI LUMBAR RESULTS        Initial Vitals:  /62 (BP Location: Right arm, Patient Position: Chair, Cuff Size: Adult Regular)  Temp 97.4  F (36.3  C) (Temporal)  Ht 5' 7.25\" (1.708 m)  Wt 214 lb 11.2 oz (97.4 kg)  BMI 33.38 kg/m2 Estimated body mass index is 33.38 kg/(m^2) as calculated from the following:    Height as of this encounter: 5' 7.25\" (1.708 m).    Weight as of this encounter: 214 lb 11.2 oz (97.4 kg).. Body surface area is 2.15 meters squared. BP completed using cuff size: regular  Mild Pain (2)    Do you feel safe in your environment?  Yes  Do you need any refills today? No    Nursing Comments:         Juany Mir CMA    "
Never smoker

## 2019-01-21 NOTE — H&P ADULT - PROBLEM SELECTOR PROBLEM 7
Need for prophylactic measure Do not resuscitate status with supporting documentation Deep vein thrombosis (DVT)

## 2019-01-21 NOTE — H&P ADULT - MENTAL STATUS
A&Ox2-3 (Knew her name, knew she was in hospital and thought it was 2018) Baseline per HHA at bedside

## 2019-01-21 NOTE — H&P ADULT - PROBLEM SELECTOR PLAN 4
cont levothyroxine echo ordered  strict I&Os  Fluid restriction  Will try lasix 20 IV x1, then cont lasix as PO  cont aldactone and amio  cont aldactone Hypervolemic on exam  echo ordered  strict I&Os  Fluid restriction  Will start IV lasix 40 mg daily   cont aldactone and amio  cont aldactone - Hypervolemic on exam  - Echo ordered  - Strict I&Os  - Fluid restriction  - Will start IV lasix 40 mg daily   - Cont aldactone and amio  - Cardiology consult in AM

## 2019-01-21 NOTE — CHART NOTE - NSCHARTNOTEFT_GEN_A_CORE
pt seen and examined. VS and labs reviewed    CT a/p reviewed- pt appears to have large stool burden. Diarrhea likely overflow 2/2 constipation. will resume Miralax.     CT chest reviewed- pulm edema with small pleural effusion. will cont lasix for now    Rest of plan as per H&P note from this am

## 2019-01-21 NOTE — H&P ADULT - ASSESSMENT
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 admitted for pulm edema/pleural effusion on CT

## 2019-01-21 NOTE — H&P ADULT - PROBLEM SELECTOR PLAN 5
Seen on CT abdomen.   Seen in previous imaging. needs to contact family in Am regarding what work up has done for the tumor. cont levothyroxine - Cont levothyroxine

## 2019-01-21 NOTE — PHYSICAL THERAPY INITIAL EVALUATION ADULT - ADDITIONAL COMMENTS
lives in a home with 24/7 home health aides; patient ambulates short distances but mainly wheelchair bound since discharge from rehab

## 2019-01-21 NOTE — H&P ADULT - PROBLEM SELECTOR PLAN 1
Admit to tele  check cbc, bmp, a1c, flp,t sh, trend CE  echo ordered  strict I&Os  Fluid restriction  Will try lasix 20 IV x1, then cont lasix as PO Admit to tele  check cbc, bmp, a1c, flp,t sh, trend CE  echo ordered  Patient has no LE edema and patient has been having episodes of diarrhea prior to arrival. Patient also with elevated BUN/Cr ratio that suggests possible dehydration. But patient decreased breath sounds in bases and findings of pleura effusion on imaging.    strict I&Os  Fluid restriction  Daily weights Admit to tele  check cbc, bmp, a1c, flp,t sh, trend CE  echo ordered  Patient has no LE edema and patient has been having episodes of diarrhea prior to arrival. Patient also with elevated BUN/Cr ratio that suggests possible dehydration. But patient decreased breath sounds in bases and findings of pleura effusion on imaging.    strict I&Os  Fluid restriction  Daily weights  Discussed with Dr. Coffman Suggestive of pulmonary edema per CT read. Can be from decompensated heart failure 2/2 infection vs. PNA.   - Admit to tele  - Trend Alexa  - Echo ordered  - Patient has no LE edema and patient has been having episodes of diarrhea prior to arrival. Patient also with elevated BUN/Cr ratio that suggests possible dehydration. But patient with decreased breath sounds in bases, mild JVD, and findings of pleural effusion on imaging. Pro-BNP also elevated from prior value. Will start IV Lasix 40 mg daily.   - CT chest ordered; urine legionella ordered  - Will start CAP coverage for possible PNA, as pt later with complaint of SOB/cough  - Denita q6hrs  - Strict I&Os  - Fluid restriction  - Daily weights

## 2019-01-21 NOTE — H&P ADULT - HISTORY OF PRESENT ILLNESS
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 Patient with hx of dementia. Patient denies any complaint of pain. Appears comfortable. History obtained from chart. Patient was admitted for UTI and weakness on 12/24 and was discharged to rehab. Patient returned from rehab recently. Patient developed diarrhea 3 days ago after receiving enema and laxatives. Episodes are non bloody. No complaints of chest pain, SOB, nausea, vomiting. 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 Patient with hx of dementia. Patient denies any complaint of pain. Appears comfortable. History obtained from chart. Patient was admitted for UTI and weakness on 12/24 and was discharged to rehab. Patient returned from rehab recently. Patient developed diarrhea 3 days ago after receiving enema and laxatives. Episodes are non bloody. No complaints of chest pain, SOB, nausea, vomiting.     Addendum: Patient is a poor historian but was complaining of SOB and cough when examined again later in the night.

## 2019-01-21 NOTE — PHYSICAL THERAPY INITIAL EVALUATION ADULT - LEVEL OF CONSCIOUSNESS, REHAB EVAL
alert/confused Cheiloplasty (Complex) Text: A decision was made to reconstruct the defect with a  cheiloplasty.  The defect was undermined extensively.  Additional obicularis oris muscle was excised with a 15 blade scalpel.  The defect was converted into a full thickness wedge to facilite a better cosmetic result.  Small vessels were then tied off with 5-0 monocyrl. The obicularis oris, superficial fascia, adipose and dermis were then reapproximated.  After the deeper layers were approximated the epidermis was reapproximated with particular care given to realign the vermillion border.

## 2019-01-21 NOTE — PHYSICAL THERAPY INITIAL EVALUATION ADULT - PERTINENT HX OF CURRENT PROBLEM, REHAB EVAL
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

## 2019-01-22 DIAGNOSIS — E43 UNSPECIFIED SEVERE PROTEIN-CALORIE MALNUTRITION: ICD-10-CM

## 2019-01-22 DIAGNOSIS — I50.31 ACUTE DIASTOLIC (CONGESTIVE) HEART FAILURE: ICD-10-CM

## 2019-01-22 DIAGNOSIS — B34.2 CORONAVIRUS INFECTION, UNSPECIFIED: ICD-10-CM

## 2019-01-22 LAB
ALBUMIN SERPL ELPH-MCNC: 2.4 G/DL — LOW (ref 3.3–5)
ALP SERPL-CCNC: 78 U/L — SIGNIFICANT CHANGE UP (ref 40–120)
ALT FLD-CCNC: 16 U/L — SIGNIFICANT CHANGE UP (ref 4–33)
ANION GAP SERPL CALC-SCNC: 13 MMO/L — SIGNIFICANT CHANGE UP (ref 7–14)
AST SERPL-CCNC: 13 U/L — SIGNIFICANT CHANGE UP (ref 4–32)
BACTERIA UR CULT: SIGNIFICANT CHANGE UP
BILIRUB SERPL-MCNC: 0.6 MG/DL — SIGNIFICANT CHANGE UP (ref 0.2–1.2)
BUN SERPL-MCNC: 19 MG/DL — SIGNIFICANT CHANGE UP (ref 7–23)
CALCIUM SERPL-MCNC: 7.9 MG/DL — LOW (ref 8.4–10.5)
CHLORIDE SERPL-SCNC: 100 MMOL/L — SIGNIFICANT CHANGE UP (ref 98–107)
CO2 SERPL-SCNC: 26 MMOL/L — SIGNIFICANT CHANGE UP (ref 22–31)
CREAT SERPL-MCNC: 0.7 MG/DL — SIGNIFICANT CHANGE UP (ref 0.5–1.3)
GLUCOSE SERPL-MCNC: 120 MG/DL — HIGH (ref 70–99)
HCT VFR BLD CALC: 29 % — LOW (ref 34.5–45)
HGB BLD-MCNC: 8.6 G/DL — LOW (ref 11.5–15.5)
MAGNESIUM SERPL-MCNC: 2.1 MG/DL — SIGNIFICANT CHANGE UP (ref 1.6–2.6)
MCHC RBC-ENTMCNC: 29.7 % — LOW (ref 32–36)
MCHC RBC-ENTMCNC: 29.8 PG — SIGNIFICANT CHANGE UP (ref 27–34)
MCV RBC AUTO: 100.3 FL — HIGH (ref 80–100)
NRBC # FLD: 0 K/UL — LOW (ref 25–125)
O+P SPEC CONC: SIGNIFICANT CHANGE UP
OB PNL STL: NEGATIVE — SIGNIFICANT CHANGE UP
PHOSPHATE SERPL-MCNC: 3.1 MG/DL — SIGNIFICANT CHANGE UP (ref 2.5–4.5)
PLATELET # BLD AUTO: 479 K/UL — HIGH (ref 150–400)
PMV BLD: 10.2 FL — SIGNIFICANT CHANGE UP (ref 7–13)
POTASSIUM SERPL-MCNC: 3.5 MMOL/L — SIGNIFICANT CHANGE UP (ref 3.5–5.3)
POTASSIUM SERPL-SCNC: 3.5 MMOL/L — SIGNIFICANT CHANGE UP (ref 3.5–5.3)
PROT SERPL-MCNC: 5.9 G/DL — LOW (ref 6–8.3)
RBC # BLD: 2.89 M/UL — LOW (ref 3.8–5.2)
RBC # FLD: 15 % — HIGH (ref 10.3–14.5)
SODIUM SERPL-SCNC: 139 MMOL/L — SIGNIFICANT CHANGE UP (ref 135–145)
SPECIMEN SOURCE: SIGNIFICANT CHANGE UP
TRI STN SPEC: SIGNIFICANT CHANGE UP
WBC # BLD: 10.63 K/UL — HIGH (ref 3.8–10.5)
WBC # FLD AUTO: 10.63 K/UL — HIGH (ref 3.8–10.5)

## 2019-01-22 PROCEDURE — 99233 SBSQ HOSP IP/OBS HIGH 50: CPT

## 2019-01-22 PROCEDURE — 93306 TTE W/DOPPLER COMPLETE: CPT | Mod: 26

## 2019-01-22 RX ORDER — SENNA PLUS 8.6 MG/1
2 TABLET ORAL AT BEDTIME
Qty: 0 | Refills: 0 | Status: DISCONTINUED | OUTPATIENT
Start: 2019-01-22 | End: 2019-01-22

## 2019-01-22 RX ORDER — HALOPERIDOL DECANOATE 100 MG/ML
0.5 INJECTION INTRAMUSCULAR ONCE
Qty: 0 | Refills: 0 | Status: COMPLETED | OUTPATIENT
Start: 2019-01-22 | End: 2019-01-22

## 2019-01-22 RX ORDER — ACETAMINOPHEN 500 MG
650 TABLET ORAL EVERY 6 HOURS
Qty: 0 | Refills: 0 | Status: DISCONTINUED | OUTPATIENT
Start: 2019-01-22 | End: 2019-02-04

## 2019-01-22 RX ORDER — FUROSEMIDE 40 MG
20 TABLET ORAL DAILY
Qty: 0 | Refills: 0 | Status: DISCONTINUED | OUTPATIENT
Start: 2019-01-23 | End: 2019-01-27

## 2019-01-22 RX ORDER — IPRATROPIUM/ALBUTEROL SULFATE 18-103MCG
3 AEROSOL WITH ADAPTER (GRAM) INHALATION EVERY 8 HOURS
Qty: 0 | Refills: 0 | Status: DISCONTINUED | OUTPATIENT
Start: 2019-01-22 | End: 2019-02-04

## 2019-01-22 RX ADMIN — ATORVASTATIN CALCIUM 10 MILLIGRAM(S): 80 TABLET, FILM COATED ORAL at 21:34

## 2019-01-22 RX ADMIN — ESCITALOPRAM OXALATE 5 MILLIGRAM(S): 10 TABLET, FILM COATED ORAL at 13:35

## 2019-01-22 RX ADMIN — QUETIAPINE FUMARATE 25 MILLIGRAM(S): 200 TABLET, FILM COATED ORAL at 18:11

## 2019-01-22 RX ADMIN — SPIRONOLACTONE 12.5 MILLIGRAM(S): 25 TABLET, FILM COATED ORAL at 13:35

## 2019-01-22 RX ADMIN — Medication 3 MILLILITER(S): at 10:06

## 2019-01-22 RX ADMIN — Medication 3 MILLILITER(S): at 04:31

## 2019-01-22 RX ADMIN — SODIUM CHLORIDE 3 MILLILITER(S): 9 INJECTION INTRAMUSCULAR; INTRAVENOUS; SUBCUTANEOUS at 13:24

## 2019-01-22 RX ADMIN — AZITHROMYCIN 250 MILLIGRAM(S): 500 TABLET, FILM COATED ORAL at 10:32

## 2019-01-22 RX ADMIN — Medication 100 MICROGRAM(S): at 05:29

## 2019-01-22 RX ADMIN — Medication 40 MILLIGRAM(S): at 05:29

## 2019-01-22 RX ADMIN — AMIODARONE HYDROCHLORIDE 100 MILLIGRAM(S): 400 TABLET ORAL at 05:29

## 2019-01-22 RX ADMIN — CEFTRIAXONE 100 GRAM(S): 500 INJECTION, POWDER, FOR SOLUTION INTRAMUSCULAR; INTRAVENOUS at 09:19

## 2019-01-22 RX ADMIN — Medication 1 TABLET(S): at 13:36

## 2019-01-22 RX ADMIN — SODIUM CHLORIDE 3 MILLILITER(S): 9 INJECTION INTRAMUSCULAR; INTRAVENOUS; SUBCUTANEOUS at 21:37

## 2019-01-22 RX ADMIN — Medication 81 MILLIGRAM(S): at 13:35

## 2019-01-22 RX ADMIN — SODIUM CHLORIDE 3 MILLILITER(S): 9 INJECTION INTRAMUSCULAR; INTRAVENOUS; SUBCUTANEOUS at 07:04

## 2019-01-22 RX ADMIN — HALOPERIDOL DECANOATE 0.5 MILLIGRAM(S): 100 INJECTION INTRAMUSCULAR at 01:59

## 2019-01-22 RX ADMIN — HEPARIN SODIUM 5000 UNIT(S): 5000 INJECTION INTRAVENOUS; SUBCUTANEOUS at 18:11

## 2019-01-22 NOTE — PROGRESS NOTE ADULT - SUBJECTIVE AND OBJECTIVE BOX
Patient is a 89y old  Female who presents with a chief complaint of weakness and diarrhea    SUBJECTIVE / OVERNIGHT EVENTS:    Patient denies complaint  Reports she feels she needs to urinate  No CP, SOB    Tele: sinus, PVC    MEDICATIONS  (STANDING):  ALBUTerol/ipratropium for Nebulization 3 milliLiter(s) Nebulizer every 6 hours  amiodarone    Tablet 100 milliGRAM(s) Oral daily  aspirin  chewable 81 milliGRAM(s) Oral daily  atorvastatin 10 milliGRAM(s) Oral at bedtime  escitalopram 5 milliGRAM(s) Oral daily  heparin  Injectable 5000 Unit(s) SubCutaneous every 12 hours  levothyroxine 100 MICROGram(s) Oral daily  multivitamin 1 Tablet(s) Oral daily  QUEtiapine 25 milliGRAM(s) Oral every 24 hours  senna 2 Tablet(s) Oral at bedtime  sodium chloride 0.9% lock flush 3 milliLiter(s) IV Push every 8 hours  spironolactone 12.5 milliGRAM(s) Oral <User Schedule>    MEDICATIONS  (PRN):  acetaminophen   Tablet .. 650 milliGRAM(s) Oral every 6 hours PRN Mild Pain (1 - 3), Moderate Pain (4 - 6), Severe Pain (7 - 10)    T(C): 36.4 (19 @ 13:33), Max: 36.8 (19 @ 04:30)  HR: 60 (19 @ 13:33) (60 - 74)  BP: 109/58 (19 @ 13:33) (100/55 - 114/56)  RR: 16 (19 @ 13:33) (14 - 18)  SpO2: 100% (19 @ 13:33) (94% - 100%)    PHYSICAL EXAM:  GENERAL: NAD, [place]  HEAD:  Atraumatic, Normocephalic  EYES: EOMI, PERRLA, conjunctiva and sclera clear  NECK: Supple, No JVD  CHEST/LUNG: Clear to auscultation bilaterally; No wheeze  HEART: Regular rate and rhythm; No murmurs, rubs, or gallops  ABDOMEN: Soft, Nontender, Nondistended; Bowel sounds present  EXTREMITIES:   warm and well perfused, No clubbing, cyanosis, or edema  PSYCH: AAOx1  NEUROLOGY: non-focal  SKIN: No rashes or lesions on visible skin     LABS:                        8.6    10.63 )-----------( 479      ( 2019 05:30 )             29.0         139  |  100  |  19  ----------------------------<  120<H>  3.5   |  26  |  0.70    Ca    7.9<L>      2019 05:30  Phos  3.1       Mg     2.1         TPro  5.9<L>  /  Alb  2.4<L>  /  TBili  0.6  /  DBili  x   /  AST  13  /  ALT  16  /  AlkPhos  78        CARDIAC MARKERS ( 2019 05:38 )  x     / x     / 41 u/L / 2.60 ng/mL / x          Urinalysis Basic - ( 2019 22:14 )  Color: YELLOW / Appearance: CLEAR / S.030 / pH: 6.5  Gluc: NEGATIVE / Ketone: NEGATIVE  / Bili: NEGATIVE / Urobili: NORMAL   Blood: NEGATIVE / Protein: 20 / Nitrite: NEGATIVE   Leuk Esterase: NEGATIVE / RBC: 0-2 / WBC 0-2   Sq Epi: OCC / Non Sq Epi: x / Bacteria: NEGATIVE      Consultant(s) Notes Reviewed:    Care Discussed with Consultants/Other Providers:

## 2019-01-22 NOTE — DIETITIAN INITIAL EVALUATION ADULT. - PROBLEM SELECTOR PLAN 4
- Hypervolemic on exam  - Echo ordered  - Strict I&Os  - Fluid restriction  - Will start IV lasix 40 mg daily   - Cont aldactone and amio  - Cardiology consult in AM

## 2019-01-22 NOTE — DIETITIAN INITIAL EVALUATION ADULT. - PHYSICAL APPEARANCE
other (specify)/Subcutaneous FAT LOSS:  [SEVERE] Orbital fat pads region, [SEVERE] Buccal fat region, [ ]Triceps region,  [ ]Ribs region  MUSCLE WASTING: [SEVERE]Temples region, [SEVERE]Clavicle region, [ ]Shoulder region, [ ]Scapula region, [SEVERE] Interosseous region,  [ ]thigh region, [ ]Calf region.

## 2019-01-22 NOTE — DIETITIAN INITIAL EVALUATION ADULT. - PROBLEM SELECTOR PLAN 2
Likely from diarrhea  - Blood cultures ordered   - Urine legionella ordered  - Will hold laxatives [senna, linzess and miralax]

## 2019-01-22 NOTE — PROGRESS NOTE ADULT - PROBLEM SELECTOR PLAN 4
CT abdomen with findings of mild bilateral hydroureteronephrosis to the level of the bladder, here voiding however on US still with residuals.  In August has similar retention issues, thought 2/2 pain, seen by urology.  - bladder scan  - straight cath protocol

## 2019-01-22 NOTE — DIETITIAN INITIAL EVALUATION ADULT. - FACTORS AFF FOOD INTAKE
change in mental status change in mental status/difficulty chewing/difficulty swallowing/difficulty feeding self

## 2019-01-22 NOTE — CHART NOTE - NSCHARTNOTEFT_GEN_A_CORE
NUTRITION SERVICES     Upon Nutritional Assessment by the Registered Dietitian your patient was determined to meet criteria/ has evidence of the following diagnosis/diagnoses:  [ ] Mild Protein Calorie Malnutrition   [ ] Moderate Protein Calorie Malnutrition   [ X] Severe Protein Calorie Malnutrition   [ ] Unspecified Protein Calorie Malnutrition   [ ] Underweight / BMI <19  [ ] Morbid Obesity / BMI >40    Findings as based on:  •  Comprehensive nutritional assessment and consultation    Please refer to Initial Dietitian Evaluation via documents section of [employer] for further recommendations.    Yolanda Brenner RD,CD/N,CDE

## 2019-01-22 NOTE — PROVIDER CONTACT NOTE (OTHER) - SITUATION
Patient's aide states that patient has been unable to void since admission. She stated that the patient has been straight cathed in the ED twice. Patient keeps asking for bedpan but unable to void.

## 2019-01-22 NOTE — DIETITIAN INITIAL EVALUATION ADULT. - PROBLEM SELECTOR PLAN 1
Suggestive of pulmonary edema per CT read. Can be from decompensated heart failure 2/2 infection vs. PNA.   - Admit to tele  - Trend Alexa  - Echo ordered  - Patient has no LE edema and patient has been having episodes of diarrhea prior to arrival. Patient also with elevated BUN/Cr ratio that suggests possible dehydration. But patient with decreased breath sounds in bases, mild JVD, and findings of pleural effusion on imaging. Pro-BNP also elevated from prior value. Will start IV Lasix 40 mg daily.   - CT chest ordered; urine legionella ordered  - Will start CAP coverage for possible PNA, as pt later with complaint of SOB/cough  - Denita q6hrs  - Strict I&Os  - Fluid restriction  - Daily weights

## 2019-01-22 NOTE — DIETITIAN INITIAL EVALUATION ADULT. - PROBLEM SELECTOR PLAN 3
- CT abdomen with findings of Mild bilateral hydroureteronephrosis to the level of the bladder  - Per RN, patient voided. Will check bladder scan. Monitor I&Os. If pt with urinary retention, will need hensley cath.

## 2019-01-22 NOTE — PROGRESS NOTE ADULT - PROBLEM SELECTOR PLAN 5
Seen on CT abdomen.  Seen in previous imaging.   - d/w son who states was monitoring as OP and that given age he was told cardiac issues preclude her from surgery and she had stated she wouldn't want further intervention

## 2019-01-22 NOTE — PROGRESS NOTE ADULT - PROBLEM SELECTOR PLAN 1
Pulmonary edema & effusions per CT read suspect related to HF, has history of diastolic HF per our last TTE   - dc tele  - f/u TTE   - s/p lasix 40 mg IV x 2 days, transition to 20 mg PO daily   - dc abx as low suspicion for PNA (CT chest neg, no fever)  - Strict I&Os, daily weights

## 2019-01-22 NOTE — DIETITIAN INITIAL EVALUATION ADULT. - PROBLEM SELECTOR PLAN 6
- Seen on CT abdomen.   - Seen in previous imaging. Needs to contact family in AM regarding what work up was done for the tumor.

## 2019-01-22 NOTE — DIETITIAN INITIAL EVALUATION ADULT. - OTHER INFO
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 admitted for pulm edema/pleural effusion on CT. Received nutrition consult for poor po intakes >5 days prior to admission. Per Aide, Pt ate pretty well this morning, has dentures but eats without it. Continues with lose bowel movements which maybe related to overflow 2/2 constipation per MD notes. No reported nausea, vomiting. Pt is receiving Ensure Enlive but prefers chocolate flavor. Pt likely with weight loss but Aide was not able to state amount lost.

## 2019-01-22 NOTE — DIETITIAN INITIAL EVALUATION ADULT. - NS AS NUTRI INTERV MEALS SNACK
Diets modified for specific foods and ingredients/1. Recommend change diet to Low Na, Soft with Nectar Thick Liquids, 1200 mL Fluid Restriction. 2. Recommend Ensure Enlive 2x daily. 1. Recommend change diet to Low Na, Soft with Nectar Thick Liquids, 1200 mL Fluid Restriction. 2. Recommend Ensure Enlive 2x daily.  3. Suggest SLP Eval to assess swllow evaluation, appropriate fluid consistency./Diets modified for specific foods and ingredients Diets modified for specific foods and ingredients/1. Recommend change diet to Low Na, Soft with Nectar Thick Liquids, 1200 mL Fluid Restriction. 2. Recommend Ensure Enlive 2x daily.  3. Suggest SLP Eval to assess swallow function, determine appropriate fluid consistency.

## 2019-01-23 LAB
ANION GAP SERPL CALC-SCNC: 10 MMO/L — SIGNIFICANT CHANGE UP (ref 7–14)
BASOPHILS # BLD AUTO: 0.04 K/UL — SIGNIFICANT CHANGE UP (ref 0–0.2)
BASOPHILS NFR BLD AUTO: 0.4 % — SIGNIFICANT CHANGE UP (ref 0–2)
BUN SERPL-MCNC: 18 MG/DL — SIGNIFICANT CHANGE UP (ref 7–23)
CALCIUM SERPL-MCNC: 8.2 MG/DL — LOW (ref 8.4–10.5)
CHLORIDE SERPL-SCNC: 102 MMOL/L — SIGNIFICANT CHANGE UP (ref 98–107)
CO2 SERPL-SCNC: 27 MMOL/L — SIGNIFICANT CHANGE UP (ref 22–31)
CREAT SERPL-MCNC: 0.71 MG/DL — SIGNIFICANT CHANGE UP (ref 0.5–1.3)
EOSINOPHIL # BLD AUTO: 0.15 K/UL — SIGNIFICANT CHANGE UP (ref 0–0.5)
EOSINOPHIL NFR BLD AUTO: 1.5 % — SIGNIFICANT CHANGE UP (ref 0–6)
FERRITIN SERPL-MCNC: 554.4 NG/ML — HIGH (ref 15–150)
GLUCOSE SERPL-MCNC: 106 MG/DL — HIGH (ref 70–99)
HCT VFR BLD CALC: 29.4 % — LOW (ref 34.5–45)
HGB BLD-MCNC: 9.1 G/DL — LOW (ref 11.5–15.5)
IMM GRANULOCYTES NFR BLD AUTO: 2.4 % — HIGH (ref 0–1.5)
IRON SATN MFR SERPL: 156 UG/DL — SIGNIFICANT CHANGE UP (ref 140–530)
IRON SATN MFR SERPL: 17 UG/DL — LOW (ref 30–160)
LYMPHOCYTES # BLD AUTO: 1.27 K/UL — SIGNIFICANT CHANGE UP (ref 1–3.3)
LYMPHOCYTES # BLD AUTO: 13 % — SIGNIFICANT CHANGE UP (ref 13–44)
MAGNESIUM SERPL-MCNC: 2.2 MG/DL — SIGNIFICANT CHANGE UP (ref 1.6–2.6)
MCHC RBC-ENTMCNC: 30.3 PG — SIGNIFICANT CHANGE UP (ref 27–34)
MCHC RBC-ENTMCNC: 31 % — LOW (ref 32–36)
MCV RBC AUTO: 98 FL — SIGNIFICANT CHANGE UP (ref 80–100)
MONOCYTES # BLD AUTO: 1.1 K/UL — HIGH (ref 0–0.9)
MONOCYTES NFR BLD AUTO: 11.2 % — SIGNIFICANT CHANGE UP (ref 2–14)
NEUTROPHILS # BLD AUTO: 6.99 K/UL — SIGNIFICANT CHANGE UP (ref 1.8–7.4)
NEUTROPHILS NFR BLD AUTO: 71.5 % — SIGNIFICANT CHANGE UP (ref 43–77)
NRBC # FLD: 0.02 K/UL — LOW (ref 25–125)
PLATELET # BLD AUTO: 482 K/UL — HIGH (ref 150–400)
PMV BLD: 10 FL — SIGNIFICANT CHANGE UP (ref 7–13)
POTASSIUM SERPL-MCNC: 3.6 MMOL/L — SIGNIFICANT CHANGE UP (ref 3.5–5.3)
POTASSIUM SERPL-SCNC: 3.6 MMOL/L — SIGNIFICANT CHANGE UP (ref 3.5–5.3)
RBC # BLD: 3 M/UL — LOW (ref 3.8–5.2)
RBC # FLD: 15.3 % — HIGH (ref 10.3–14.5)
RETICS #: 61 K/UL — SIGNIFICANT CHANGE UP (ref 25–125)
RETICS/RBC NFR: 2 % — SIGNIFICANT CHANGE UP (ref 0.5–2.5)
SODIUM SERPL-SCNC: 139 MMOL/L — SIGNIFICANT CHANGE UP (ref 135–145)
UIBC SERPL-MCNC: 139 UG/DL — SIGNIFICANT CHANGE UP (ref 110–370)
WBC # BLD: 9.78 K/UL — SIGNIFICANT CHANGE UP (ref 3.8–10.5)
WBC # FLD AUTO: 9.78 K/UL — SIGNIFICANT CHANGE UP (ref 3.8–10.5)

## 2019-01-23 PROCEDURE — 99233 SBSQ HOSP IP/OBS HIGH 50: CPT

## 2019-01-23 RX ORDER — QUETIAPINE FUMARATE 200 MG/1
25 TABLET, FILM COATED ORAL
Qty: 0 | Refills: 0 | Status: DISCONTINUED | OUTPATIENT
Start: 2019-01-23 | End: 2019-02-04

## 2019-01-23 RX ORDER — HALOPERIDOL DECANOATE 100 MG/ML
1 INJECTION INTRAMUSCULAR ONCE
Qty: 0 | Refills: 0 | Status: COMPLETED | OUTPATIENT
Start: 2019-01-23 | End: 2019-01-23

## 2019-01-23 RX ORDER — SENNA PLUS 8.6 MG/1
2 TABLET ORAL AT BEDTIME
Qty: 0 | Refills: 0 | Status: DISCONTINUED | OUTPATIENT
Start: 2019-01-23 | End: 2019-01-31

## 2019-01-23 RX ADMIN — ESCITALOPRAM OXALATE 5 MILLIGRAM(S): 10 TABLET, FILM COATED ORAL at 12:48

## 2019-01-23 RX ADMIN — Medication 3 MILLILITER(S): at 23:08

## 2019-01-23 RX ADMIN — AMIODARONE HYDROCHLORIDE 100 MILLIGRAM(S): 400 TABLET ORAL at 05:47

## 2019-01-23 RX ADMIN — Medication 1 TABLET(S): at 12:47

## 2019-01-23 RX ADMIN — SODIUM CHLORIDE 3 MILLILITER(S): 9 INJECTION INTRAMUSCULAR; INTRAVENOUS; SUBCUTANEOUS at 06:00

## 2019-01-23 RX ADMIN — ATORVASTATIN CALCIUM 10 MILLIGRAM(S): 80 TABLET, FILM COATED ORAL at 21:44

## 2019-01-23 RX ADMIN — HALOPERIDOL DECANOATE 1 MILLIGRAM(S): 100 INJECTION INTRAMUSCULAR at 05:41

## 2019-01-23 RX ADMIN — Medication 3 MILLILITER(S): at 14:58

## 2019-01-23 RX ADMIN — SODIUM CHLORIDE 3 MILLILITER(S): 9 INJECTION INTRAMUSCULAR; INTRAVENOUS; SUBCUTANEOUS at 21:43

## 2019-01-23 RX ADMIN — Medication 100 MICROGRAM(S): at 05:48

## 2019-01-23 RX ADMIN — QUETIAPINE FUMARATE 25 MILLIGRAM(S): 200 TABLET, FILM COATED ORAL at 21:44

## 2019-01-23 RX ADMIN — Medication 20 MILLIGRAM(S): at 05:47

## 2019-01-23 RX ADMIN — Medication 3 MILLILITER(S): at 07:49

## 2019-01-23 RX ADMIN — HEPARIN SODIUM 5000 UNIT(S): 5000 INJECTION INTRAVENOUS; SUBCUTANEOUS at 17:43

## 2019-01-23 RX ADMIN — HEPARIN SODIUM 5000 UNIT(S): 5000 INJECTION INTRAVENOUS; SUBCUTANEOUS at 05:47

## 2019-01-23 RX ADMIN — SODIUM CHLORIDE 3 MILLILITER(S): 9 INJECTION INTRAMUSCULAR; INTRAVENOUS; SUBCUTANEOUS at 13:01

## 2019-01-23 RX ADMIN — Medication 81 MILLIGRAM(S): at 12:47

## 2019-01-23 RX ADMIN — SENNA PLUS 2 TABLET(S): 8.6 TABLET ORAL at 21:44

## 2019-01-23 NOTE — PROGRESS NOTE ADULT - PROBLEM SELECTOR PLAN 3
- c/w supportive care Possibly from stool burden in rectum   - bowel regimen, assure regular BM  - stool count

## 2019-01-23 NOTE — PROGRESS NOTE ADULT - PROBLEM SELECTOR PLAN 2
Possibly from stool burden in rectum   - bowel regimen, assure regular BM  - stool count CT abdomen with findings of mild bilateral hydroureteronephrosis to the level of the bladder, here voiding however on US still with residuals suspect long-standing retention.  In August has similar retention issues, thought 2/2 pain, seen by urology.  - s/p straight cath protocol x3, retained, hensley placed on 1/23 with 600cc of UOP  - will need to d/w son re: hensley for home vs staight cath  - will need OP  f/u

## 2019-01-23 NOTE — CHART NOTE - NSCHARTNOTEFT_GEN_A_CORE
Care d/w son and his wife at bedside today. Discussed clinical course thus far--no infection found aside from coronavirus.  Imaging notable for pulm edema started on lasix. Discussed bladder scans with chronic elevated volumes even though voiding, had straight cath x 3 and Bobby placed this am with 600 cc output.  They report some general decline--previously quite independent and ambulating, now s/p 3 hospitals stays in 6 months with worsening status.  Goal would be to get her home.  Were planned for PT in the home.  They are unsure about catheter and feel she might pull it out (has in the past) and don't think they can straight cath her.  Also wondering how they would be able to move her around at home given decline.  Would be interested in hearing about home hospice.   Hospice c/s called.  Will reassess her tomorrow as s/p haldol his am remains very sleepy, she is following commands but has not been able to do much today.

## 2019-01-23 NOTE — PROGRESS NOTE ADULT - PROBLEM SELECTOR PLAN 1
Pulmonary edema & effusions per CT read suspect related to HF, has history of diastolic HF per our last TTE   - no events on tele, will dc tele  - TTE reviewed    - s/p lasix 40 mg IV x 2 days, c/w lasix 20 mg PO daily   - Strict I&Os, daily weights

## 2019-01-23 NOTE — PROGRESS NOTE ADULT - SUBJECTIVE AND OBJECTIVE BOX
Patient is a 89y old  Female who presents with a chief complaint of weakness and diarrhea    SUBJECTIVE / OVERNIGHT EVENTS:    patient is lethargic this am, given haldol 1 mg at ~5am for agitation  currently sleeping in [facility], hensley being placed by RNs  no report of diarrhea  did not take breakfast this am 2/2 sedation     MEDICATIONS  (STANDING):  ALBUTerol/ipratropium for Nebulization 3 milliLiter(s) Nebulizer every 8 hours  amiodarone    Tablet 100 milliGRAM(s) Oral daily  aspirin  chewable 81 milliGRAM(s) Oral daily  atorvastatin 10 milliGRAM(s) Oral at bedtime  escitalopram 5 milliGRAM(s) Oral daily  furosemide    Tablet 20 milliGRAM(s) Oral daily  heparin  Injectable 5000 Unit(s) SubCutaneous every 12 hours  levothyroxine 100 MICROGram(s) Oral daily  multivitamin 1 Tablet(s) Oral daily  QUEtiapine 25 milliGRAM(s) Oral <User Schedule>  senna 2 Tablet(s) Oral at bedtime  sodium chloride 0.9% lock flush 3 milliLiter(s) IV Push every 8 hours  spironolactone 12.5 milliGRAM(s) Oral <User Schedule>    MEDICATIONS  (PRN):  acetaminophen   Tablet .. 650 milliGRAM(s) Oral every 6 hours PRN Mild Pain (1 - 3), Moderate Pain (4 - 6), Severe Pain (7 - 10)    T(C): 36.6 (01-23-19 @ 09:30), Max: 36.9 (01-22-19 @ 18:10)  HR: 60 (01-23-19 @ 09:30) (60 - 68)  BP: 108/57 (01-23-19 @ 09:30) (105/51 - 136/59)  RR: 18 (01-23-19 @ 09:30) (16 - 18)  SpO2: 96% (01-23-19 @ 09:30) (93% - 100%)    I&O's Summary    22 Jan 2019 07:01  -  23 Jan 2019 07:00  --------------------------------------------------------  IN: 300 mL / OUT: 475 mL / NET: -175 mL    23 Jan 2019 07:01  -  23 Jan 2019 12:26  --------------------------------------------------------  IN: 0 mL / OUT: 500 mL / NET: -500 mL    PHYSICAL EXAM:  GENERAL: NAD, [place], sedated   CHEST/LUNG: Clear to auscultation bilaterally; No wheeze  HEART: Regular rate and rhythm; SEJM; no rubs, or gallops  ABDOMEN: Soft, Nontender, Nondistended; Bowel sounds present  EXTREMITIES:   warm and well perfused, No clubbing, cyanosis, or edema  PSYCH: sedated   NEUROLOGY: non-focal  SKIN: No rashes or lesions on visible skin     LABS:                        9.1    9.78  )-----------( 482      ( 23 Jan 2019 06:07 )             29.4     01-23    139  |  102  |  18  ----------------------------<  106<H>  3.6   |  27  |  0.71    Ca    8.2<L>      23 Jan 2019 06:07  Phos  3.1     01-22  Mg     2.2     01-23    TPro  5.9<L>  /  Alb  2.4<L>  /  TBili  0.6  /  DBili  x   /  AST  13  /  ALT  16  /  AlkPhos  78  01-22    Microbiology:     RVP: coronavirus   Urine cx: NG   blood cx: NG x 48 hours   C diff: neg  OP: neg    TTE  CONCLUSIONS:  1. Mitral annular calcification, otherwise normal mitral  valve. Mild mitral regurgitation.  2. Aortic valve leaflet morphology not well visualized.  The valve is calcified. Peak transaortic valve gradient  equals 58 mm Hg, mean transaortic valve gradient equals 35  mm Hg, estimated aortic valve area equals 1.2 sqcm (by  continuity equation), consistent with moderate aortic  stenosis. Mild aortic regurgitation.  3. Normal left ventricular internal dimensions and wall  thicknesses.  4. Endocardium not well visualized; grossly normal left  ventricular systolic function.  5. The right ventricle is not well visualized; grossly  normal right ventricular systolic function.  A device wire  is noted in the right heart.    Consultant(s) Notes Reviewed:    Care Discussed with Consultants/Other Providers:

## 2019-01-23 NOTE — PROGRESS NOTE ADULT - ATTENDING COMMENTS
Spoke to son on phone on 1/22 re: her care, he may come in today.  Advised RN to let me know if present at bedside.

## 2019-01-23 NOTE — PROGRESS NOTE ADULT - PROBLEM SELECTOR PLAN 4
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  In August has similar retention issues, thought 2/2 pain, seen by urology.  - s/p straight cath protocol x3, retained, hensley placed on 1/23 with 600cc of UOP  - will need to d/w son re: hensley for home vs staight cath  - will need OP  f/u - c/w supportive care

## 2019-01-24 DIAGNOSIS — E87.6 HYPOKALEMIA: ICD-10-CM

## 2019-01-24 LAB
ANION GAP SERPL CALC-SCNC: 13 MMO/L — SIGNIFICANT CHANGE UP (ref 7–14)
BASOPHILS # BLD AUTO: 0.03 K/UL — SIGNIFICANT CHANGE UP (ref 0–0.2)
BASOPHILS NFR BLD AUTO: 0.3 % — SIGNIFICANT CHANGE UP (ref 0–2)
BUN SERPL-MCNC: 17 MG/DL — SIGNIFICANT CHANGE UP (ref 7–23)
CALCIUM SERPL-MCNC: 8.2 MG/DL — LOW (ref 8.4–10.5)
CHLORIDE SERPL-SCNC: 101 MMOL/L — SIGNIFICANT CHANGE UP (ref 98–107)
CO2 SERPL-SCNC: 26 MMOL/L — SIGNIFICANT CHANGE UP (ref 22–31)
CREAT SERPL-MCNC: 0.67 MG/DL — SIGNIFICANT CHANGE UP (ref 0.5–1.3)
EOSINOPHIL # BLD AUTO: 0.13 K/UL — SIGNIFICANT CHANGE UP (ref 0–0.5)
EOSINOPHIL NFR BLD AUTO: 1.4 % — SIGNIFICANT CHANGE UP (ref 0–6)
GLUCOSE SERPL-MCNC: 112 MG/DL — HIGH (ref 70–99)
HCT VFR BLD CALC: 28.2 % — LOW (ref 34.5–45)
HGB BLD-MCNC: 8.7 G/DL — LOW (ref 11.5–15.5)
IMM GRANULOCYTES NFR BLD AUTO: 3.7 % — HIGH (ref 0–1.5)
LYMPHOCYTES # BLD AUTO: 1.64 K/UL — SIGNIFICANT CHANGE UP (ref 1–3.3)
LYMPHOCYTES # BLD AUTO: 18.1 % — SIGNIFICANT CHANGE UP (ref 13–44)
MAGNESIUM SERPL-MCNC: 2.1 MG/DL — SIGNIFICANT CHANGE UP (ref 1.6–2.6)
MCHC RBC-ENTMCNC: 30.5 PG — SIGNIFICANT CHANGE UP (ref 27–34)
MCHC RBC-ENTMCNC: 30.9 % — LOW (ref 32–36)
MCV RBC AUTO: 98.9 FL — SIGNIFICANT CHANGE UP (ref 80–100)
MONOCYTES # BLD AUTO: 0.99 K/UL — HIGH (ref 0–0.9)
MONOCYTES NFR BLD AUTO: 10.9 % — SIGNIFICANT CHANGE UP (ref 2–14)
NEUTROPHILS # BLD AUTO: 5.94 K/UL — SIGNIFICANT CHANGE UP (ref 1.8–7.4)
NEUTROPHILS NFR BLD AUTO: 65.6 % — SIGNIFICANT CHANGE UP (ref 43–77)
NRBC # FLD: 0 K/UL — LOW (ref 25–125)
NT-PROBNP SERPL-SCNC: 1061 PG/ML — SIGNIFICANT CHANGE UP
PLATELET # BLD AUTO: 450 K/UL — HIGH (ref 150–400)
PMV BLD: 10 FL — SIGNIFICANT CHANGE UP (ref 7–13)
POTASSIUM SERPL-MCNC: 3.2 MMOL/L — LOW (ref 3.5–5.3)
POTASSIUM SERPL-SCNC: 3.2 MMOL/L — LOW (ref 3.5–5.3)
RBC # BLD: 2.85 M/UL — LOW (ref 3.8–5.2)
RBC # FLD: 15.2 % — HIGH (ref 10.3–14.5)
SODIUM SERPL-SCNC: 140 MMOL/L — SIGNIFICANT CHANGE UP (ref 135–145)
WBC # BLD: 9.07 K/UL — SIGNIFICANT CHANGE UP (ref 3.8–10.5)
WBC # FLD AUTO: 9.07 K/UL — SIGNIFICANT CHANGE UP (ref 3.8–10.5)

## 2019-01-24 PROCEDURE — 71045 X-RAY EXAM CHEST 1 VIEW: CPT | Mod: 26

## 2019-01-24 PROCEDURE — 99233 SBSQ HOSP IP/OBS HIGH 50: CPT

## 2019-01-24 RX ORDER — POTASSIUM CHLORIDE 20 MEQ
10 PACKET (EA) ORAL ONCE
Qty: 0 | Refills: 0 | Status: COMPLETED | OUTPATIENT
Start: 2019-01-24 | End: 2019-01-24

## 2019-01-24 RX ORDER — POTASSIUM CHLORIDE 20 MEQ
20 PACKET (EA) ORAL
Qty: 0 | Refills: 0 | Status: COMPLETED | OUTPATIENT
Start: 2019-01-24 | End: 2019-01-24

## 2019-01-24 RX ADMIN — QUETIAPINE FUMARATE 25 MILLIGRAM(S): 200 TABLET, FILM COATED ORAL at 21:03

## 2019-01-24 RX ADMIN — HEPARIN SODIUM 5000 UNIT(S): 5000 INJECTION INTRAVENOUS; SUBCUTANEOUS at 17:17

## 2019-01-24 RX ADMIN — Medication 20 MILLIGRAM(S): at 05:58

## 2019-01-24 RX ADMIN — SODIUM CHLORIDE 3 MILLILITER(S): 9 INJECTION INTRAMUSCULAR; INTRAVENOUS; SUBCUTANEOUS at 21:10

## 2019-01-24 RX ADMIN — SENNA PLUS 2 TABLET(S): 8.6 TABLET ORAL at 21:04

## 2019-01-24 RX ADMIN — Medication 20 MILLIEQUIVALENT(S): at 12:57

## 2019-01-24 RX ADMIN — SODIUM CHLORIDE 3 MILLILITER(S): 9 INJECTION INTRAMUSCULAR; INTRAVENOUS; SUBCUTANEOUS at 12:58

## 2019-01-24 RX ADMIN — SODIUM CHLORIDE 3 MILLILITER(S): 9 INJECTION INTRAMUSCULAR; INTRAVENOUS; SUBCUTANEOUS at 06:04

## 2019-01-24 RX ADMIN — Medication 3 MILLILITER(S): at 07:28

## 2019-01-24 RX ADMIN — Medication 100 MICROGRAM(S): at 05:57

## 2019-01-24 RX ADMIN — Medication 1 TABLET(S): at 11:06

## 2019-01-24 RX ADMIN — HEPARIN SODIUM 5000 UNIT(S): 5000 INJECTION INTRAVENOUS; SUBCUTANEOUS at 05:58

## 2019-01-24 RX ADMIN — Medication 20 MILLIEQUIVALENT(S): at 11:06

## 2019-01-24 RX ADMIN — AMIODARONE HYDROCHLORIDE 100 MILLIGRAM(S): 400 TABLET ORAL at 05:57

## 2019-01-24 RX ADMIN — ATORVASTATIN CALCIUM 10 MILLIGRAM(S): 80 TABLET, FILM COATED ORAL at 21:05

## 2019-01-24 RX ADMIN — Medication 100 MILLIEQUIVALENT(S): at 11:44

## 2019-01-24 RX ADMIN — SPIRONOLACTONE 12.5 MILLIGRAM(S): 25 TABLET, FILM COATED ORAL at 11:07

## 2019-01-24 RX ADMIN — ESCITALOPRAM OXALATE 5 MILLIGRAM(S): 10 TABLET, FILM COATED ORAL at 11:06

## 2019-01-24 RX ADMIN — Medication 81 MILLIGRAM(S): at 11:07

## 2019-01-24 NOTE — PROGRESS NOTE ADULT - PROBLEM SELECTOR PLAN 6
Seen on CT abdomen.  Seen in previous imaging.  Now enlarging.  Discussed with son who states was monitoring as OP for several years and that given age he was told cardiac issues preclude her from surgery and she had stated she wouldn't want further intervention regardless  - monitor

## 2019-01-24 NOTE — PROGRESS NOTE ADULT - PROBLEM SELECTOR PLAN 10
SQH  DNR/DNI (MOLST)  OOB to chair; PT rec home with home PT, f/u reassessment today  [facility], son to come at 1 pm

## 2019-01-24 NOTE — CHART NOTE - NSCHARTNOTEFT_GEN_A_CORE
Called PCP Ermelinda Hill to provide update re: her long-time patient.  Reports last visit in Nov.   Appraised of reason for admission and recent fxnal decline and pending referral for hospice to assess candidacy.  She recommends patient to transition to home care MD.     Also called [facility] and made appt for urology on 2/4 with Dr. Shine for the new urinary retention issues.   Called PCP office and asked them to put in the referral. Called PCP Ermelinda Hill to provide update re: her long-time patient.  Reports last visit in Nov.   Apprised of reason for admission and hospital course and overall recent fxnal decline; advised she has a pending referral for hospice to assess candidacy.  She recommends patient to transition to home care MD.     Also called [facility] and made appt for urology on 2/4 with Dr. Shine for the new urinary retention issues.   Called PCP office and asked them to put in the referral as pt has HMO insurance.

## 2019-01-24 NOTE — PROGRESS NOTE ADULT - PROBLEM SELECTOR PLAN 2
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  In August has similar retention issues, thought 2/2 pain, seen by urology.  - s/p straight cath protocol x3, retained, hensley placed on 1/23 with 600cc of UOP  - will need to d/w son re: hensley for home vs straight cath--thinking cath might be better option although has pulled out in past   - will need OP  f/u

## 2019-01-24 NOTE — PROGRESS NOTE ADULT - SUBJECTIVE AND OBJECTIVE BOX
Patient is a 89y old  Female who presents with a chief complaint of weakness and diarrhea    SUBJECTIVE / OVERNIGHT EVENTS:    Patient more alert today  States she feels terrible, when asked why she states she has no pain but wants to go home  No CP, SOB  Denies cough (aide reports she is coughing)  Not too much PO intake, pt reports she doesn't like the food    Per RN 1 BM yesterday, ayden remains in place    MEDICATIONS  (STANDING):  ALBUTerol/ipratropium for Nebulization 3 milliLiter(s) Nebulizer every 8 hours  amiodarone    Tablet 100 milliGRAM(s) Oral daily  aspirin  chewable 81 milliGRAM(s) Oral daily  atorvastatin 10 milliGRAM(s) Oral at bedtime  escitalopram 5 milliGRAM(s) Oral daily  furosemide    Tablet 20 milliGRAM(s) Oral daily  heparin  Injectable 5000 Unit(s) SubCutaneous every 12 hours  levothyroxine 100 MICROGram(s) Oral daily  multivitamin 1 Tablet(s) Oral daily  potassium chloride    Tablet ER 20 milliEquivalent(s) Oral every 2 hours  QUEtiapine 25 milliGRAM(s) Oral <User Schedule>  senna 2 Tablet(s) Oral at bedtime  sodium chloride 0.9% lock flush 3 milliLiter(s) IV Push every 8 hours  spironolactone 12.5 milliGRAM(s) Oral <User Schedule>    MEDICATIONS  (PRN):  acetaminophen   Tablet .. 650 milliGRAM(s) Oral every 6 hours PRN Mild Pain (1 - 3), Moderate Pain (4 - 6), Severe Pain (7 - 10)    T(C): 36.2 (01-24-19 @ 08:15), Max: 36.7 (01-24-19 @ 01:28)  HR: 70 (01-24-19 @ 08:15) (58 - 70)  BP: 113/64 (01-24-19 @ 08:15) (100/54 - 133/62)  RR: 18 (01-24-19 @ 08:15) (18 - 18)  SpO2: 95% (01-24-19 @ 08:15) (95% - 98%)    I&O's Summary    23 Jan 2019 07:01  -  24 Jan 2019 07:00  --------------------------------------------------------  IN: 440 mL / OUT: 950 mL / NET: -510 mL    PHYSICAL EXAM:  GENERAL: NAD, [place]  CHEST/LUNG: Clear to auscultation bilaterally; No wheeze  HEART: Regular rate and rhythm; SEJM; no rubs, or gallops  ABDOMEN: Soft, Nontender, Nondistended; Bowel sounds present  EXTREMITIES:   warm and well perfused, No clubbing, cyanosis, or edema  PSYCH: A&Ox1-2  NEUROLOGY: non-focal  SKIN: No rashes or lesions on visible skin   +hensley    LABS:                        8.7    9.07  )-----------( 450      ( 24 Jan 2019 06:01 )             28.2     01-24    140  |  101  |  17  ----------------------------<  112<H>  3.2<L>   |  26  |  0.67    Ca    8.2<L>      24 Jan 2019 06:01  Mg     2.1     01-24    Consultant(s) Notes Reviewed:    Care Discussed with Consultants/Other Providers:

## 2019-01-24 NOTE — GOALS OF CARE CONVERSATION - PERSONAL ADVANCE DIRECTIVE - CONVERSATION DETAILS
[facility] - Met with pt's son and his wife. Hospice services explained. Son states that there are medical issues that need to addressed before he would sign for Hospice and he also wants rehab first.

## 2019-01-24 NOTE — PROGRESS NOTE ADULT - PROBLEM SELECTOR PLAN 1
Pulmonary edema & effusions per CT read suspect related to HF, has history of diastolic HF per our last TTE   - no events on tele, now off tele  - TTE reviewed    - s/p lasix 40 mg IV x 2 days, c/w lasix 20 mg PO daily   - Strict I&Os, daily weights

## 2019-01-25 LAB
ANION GAP SERPL CALC-SCNC: 13 MMO/L — SIGNIFICANT CHANGE UP (ref 7–14)
BASOPHILS # BLD AUTO: 0.04 K/UL — SIGNIFICANT CHANGE UP (ref 0–0.2)
BASOPHILS NFR BLD AUTO: 0.3 % — SIGNIFICANT CHANGE UP (ref 0–2)
BUN SERPL-MCNC: 15 MG/DL — SIGNIFICANT CHANGE UP (ref 7–23)
CALCIUM SERPL-MCNC: 8.5 MG/DL — SIGNIFICANT CHANGE UP (ref 8.4–10.5)
CHLORIDE SERPL-SCNC: 101 MMOL/L — SIGNIFICANT CHANGE UP (ref 98–107)
CO2 SERPL-SCNC: 23 MMOL/L — SIGNIFICANT CHANGE UP (ref 22–31)
CREAT SERPL-MCNC: 0.62 MG/DL — SIGNIFICANT CHANGE UP (ref 0.5–1.3)
EOSINOPHIL # BLD AUTO: 0.18 K/UL — SIGNIFICANT CHANGE UP (ref 0–0.5)
EOSINOPHIL NFR BLD AUTO: 1.6 % — SIGNIFICANT CHANGE UP (ref 0–6)
GLUCOSE SERPL-MCNC: 109 MG/DL — HIGH (ref 70–99)
HCT VFR BLD CALC: 31.7 % — LOW (ref 34.5–45)
HGB BLD-MCNC: 9.6 G/DL — LOW (ref 11.5–15.5)
IMM GRANULOCYTES NFR BLD AUTO: 3.9 % — HIGH (ref 0–1.5)
LYMPHOCYTES # BLD AUTO: 1.85 K/UL — SIGNIFICANT CHANGE UP (ref 1–3.3)
LYMPHOCYTES # BLD AUTO: 16 % — SIGNIFICANT CHANGE UP (ref 13–44)
MAGNESIUM SERPL-MCNC: 2.1 MG/DL — SIGNIFICANT CHANGE UP (ref 1.6–2.6)
MCHC RBC-ENTMCNC: 29.8 PG — SIGNIFICANT CHANGE UP (ref 27–34)
MCHC RBC-ENTMCNC: 30.3 % — LOW (ref 32–36)
MCV RBC AUTO: 98.4 FL — SIGNIFICANT CHANGE UP (ref 80–100)
MONOCYTES # BLD AUTO: 0.98 K/UL — HIGH (ref 0–0.9)
MONOCYTES NFR BLD AUTO: 8.5 % — SIGNIFICANT CHANGE UP (ref 2–14)
NEUTROPHILS # BLD AUTO: 8.04 K/UL — HIGH (ref 1.8–7.4)
NEUTROPHILS NFR BLD AUTO: 69.7 % — SIGNIFICANT CHANGE UP (ref 43–77)
NRBC # FLD: 0 K/UL — LOW (ref 25–125)
PLATELET # BLD AUTO: 469 K/UL — HIGH (ref 150–400)
PMV BLD: 10.4 FL — SIGNIFICANT CHANGE UP (ref 7–13)
POTASSIUM SERPL-MCNC: 5.5 MMOL/L — HIGH (ref 3.5–5.3)
POTASSIUM SERPL-SCNC: 5.5 MMOL/L — HIGH (ref 3.5–5.3)
RBC # BLD: 3.22 M/UL — LOW (ref 3.8–5.2)
RBC # FLD: 15.1 % — HIGH (ref 10.3–14.5)
SODIUM SERPL-SCNC: 137 MMOL/L — SIGNIFICANT CHANGE UP (ref 135–145)
WBC # BLD: 11.54 K/UL — HIGH (ref 3.8–10.5)
WBC # FLD AUTO: 11.54 K/UL — HIGH (ref 3.8–10.5)

## 2019-01-25 PROCEDURE — 99232 SBSQ HOSP IP/OBS MODERATE 35: CPT

## 2019-01-25 RX ADMIN — QUETIAPINE FUMARATE 25 MILLIGRAM(S): 200 TABLET, FILM COATED ORAL at 18:26

## 2019-01-25 RX ADMIN — ESCITALOPRAM OXALATE 5 MILLIGRAM(S): 10 TABLET, FILM COATED ORAL at 11:25

## 2019-01-25 RX ADMIN — Medication 81 MILLIGRAM(S): at 11:25

## 2019-01-25 RX ADMIN — SODIUM CHLORIDE 3 MILLILITER(S): 9 INJECTION INTRAMUSCULAR; INTRAVENOUS; SUBCUTANEOUS at 12:57

## 2019-01-25 RX ADMIN — Medication 3 MILLILITER(S): at 22:34

## 2019-01-25 RX ADMIN — SODIUM CHLORIDE 3 MILLILITER(S): 9 INJECTION INTRAMUSCULAR; INTRAVENOUS; SUBCUTANEOUS at 21:19

## 2019-01-25 RX ADMIN — HEPARIN SODIUM 5000 UNIT(S): 5000 INJECTION INTRAVENOUS; SUBCUTANEOUS at 06:13

## 2019-01-25 RX ADMIN — Medication 100 MICROGRAM(S): at 06:13

## 2019-01-25 RX ADMIN — AMIODARONE HYDROCHLORIDE 100 MILLIGRAM(S): 400 TABLET ORAL at 06:12

## 2019-01-25 RX ADMIN — Medication 1 TABLET(S): at 11:25

## 2019-01-25 RX ADMIN — Medication 20 MILLIGRAM(S): at 06:13

## 2019-01-25 RX ADMIN — Medication 3 MILLILITER(S): at 15:17

## 2019-01-25 RX ADMIN — HEPARIN SODIUM 5000 UNIT(S): 5000 INJECTION INTRAVENOUS; SUBCUTANEOUS at 18:26

## 2019-01-25 RX ADMIN — SODIUM CHLORIDE 3 MILLILITER(S): 9 INJECTION INTRAMUSCULAR; INTRAVENOUS; SUBCUTANEOUS at 06:14

## 2019-01-25 NOTE — GOALS OF CARE CONVERSATION - PERSONAL ADVANCE DIRECTIVE - CONVERSATION DETAILS
[facility] - Family is still pursuing SNF for rehab. Information left with family for Hospice follow up

## 2019-01-25 NOTE — CHART NOTE - NSCHARTNOTEFT_GEN_A_CORE
Called son to update as he was not at bedside this morning.  Advised she is ready for discharge to rehab as per his wish.   He states he will not leave with a urinary catheter.  I advised I have arranged OP f/u with urology for 2/4/19 and that I have called PCP and generated a referral for such through their system.  States he cannot lug her out of the house to see a urologist in the middle of winter.   Advised that in my experience urology can not offer anything much inpatient (cannot do urodynamics etc) while here & usually recommend dc with a hensley with OP f/u.  He states he understands my opinion but has a physician friend that told him otherwise and he refuses to leave with out an evaluation.

## 2019-01-25 NOTE — PROGRESS NOTE ADULT - SUBJECTIVE AND OBJECTIVE BOX
Patient is a 89y old  Female who presents with a chief complaint of weakness and diarrhea    SUBJECTIVE / OVERNIGHT EVENTS:    Pt looks better today, OOB to chair per aide assisted by RN and PCA)   Today she reports feeling well, denies CP, SOB, f/c      MEDICATIONS  (STANDING):  ALBUTerol/ipratropium for Nebulization 3 milliLiter(s) Nebulizer every 8 hours  amiodarone    Tablet 100 milliGRAM(s) Oral daily  aspirin  chewable 81 milliGRAM(s) Oral daily  atorvastatin 10 milliGRAM(s) Oral at bedtime  escitalopram 5 milliGRAM(s) Oral daily  furosemide    Tablet 20 milliGRAM(s) Oral daily  heparin  Injectable 5000 Unit(s) SubCutaneous every 12 hours  levothyroxine 100 MICROGram(s) Oral daily  multivitamin 1 Tablet(s) Oral daily  QUEtiapine 25 milliGRAM(s) Oral <User Schedule>  senna 2 Tablet(s) Oral at bedtime  sodium chloride 0.9% lock flush 3 milliLiter(s) IV Push every 8 hours  spironolactone 12.5 milliGRAM(s) Oral <User Schedule>    MEDICATIONS  (PRN):  acetaminophen   Tablet .. 650 milliGRAM(s) Oral every 6 hours PRN Mild Pain (1 - 3), Moderate Pain (4 - 6), Severe Pain (7 - 10)      T(C): 36.2 (01-25-19 @ 06:11), Max: 36.7 (01-24-19 @ 16:19)  HR: 71 (01-25-19 @ 06:11) (64 - 73)  BP: 125/58 (01-25-19 @ 06:11) (106/59 - 139/66)  RR: 18 (01-25-19 @ 06:11) (18 - 18)  SpO2: 95% (01-25-19 @ 06:11) (88% - 95%)    CAPILLARY BLOOD GLUCOSE          I&O's Summary    24 Jan 2019 07:01  -  25 Jan 2019 07:00  --------------------------------------------------------  IN: 480 mL / OUT: 900 mL / NET: -420 mL        PHYSICAL EXAM:  GENERAL: NAD, well-developed  HEAD:  Atraumatic, Normocephalic  EYES: EOMI, PERRLA, conjunctiva and sclera clear  NECK: Supple, No JVD  CHEST/LUNG: Clear to auscultation bilaterally; No wheeze  HEART: Regular rate and rhythm; No murmurs, rubs, or gallops  ABDOMEN: Soft, Nontender, Nondistended; Bowel sounds present  EXTREMITIES:   warm and well perfused, No clubbing, cyanosis, or edema  PSYCH: AAOx3  NEUROLOGY: non-focal  SKIN: No rashes or lesions    LABS:                        9.6    11.54 )-----------( 469      ( 25 Jan 2019 03:50 )             31.7     01-25    137  |  101  |  15  ----------------------------<  109<H>  5.5<H>   |  23  |  0.62    Ca    8.5      25 Jan 2019 03:40  Mg     2.1     01-25                Microbiology:       RADIOLOGY & ADDITIONAL TESTS:    Imaging Personally Reviewed:    Consultant(s) Notes Reviewed:      Care Discussed with Consultants/Other Providers: Patient is a 89y old  Female who presents with a chief complaint of weakness and diarrhea    SUBJECTIVE / OVERNIGHT EVENTS:    Pt looks better today, OOB to chair per aide assisted by RN and PCA)   Today she reports feeling well, denies CP, SOB, f/c  She denies coughing (aide report she is coughing)   In eating her breakfast (cereal, banana)     MEDICATIONS  (STANDING):  ALBUTerol/ipratropium for Nebulization 3 milliLiter(s) Nebulizer every 8 hours  amiodarone    Tablet 100 milliGRAM(s) Oral daily  aspirin  chewable 81 milliGRAM(s) Oral daily  atorvastatin 10 milliGRAM(s) Oral at bedtime  escitalopram 5 milliGRAM(s) Oral daily  furosemide    Tablet 20 milliGRAM(s) Oral daily  heparin  Injectable 5000 Unit(s) SubCutaneous every 12 hours  levothyroxine 100 MICROGram(s) Oral daily  multivitamin 1 Tablet(s) Oral daily  QUEtiapine 25 milliGRAM(s) Oral <User Schedule>  senna 2 Tablet(s) Oral at bedtime  sodium chloride 0.9% lock flush 3 milliLiter(s) IV Push every 8 hours  spironolactone 12.5 milliGRAM(s) Oral <User Schedule>    MEDICATIONS  (PRN):  acetaminophen   Tablet .. 650 milliGRAM(s) Oral every 6 hours PRN Mild Pain (1 - 3), Moderate Pain (4 - 6), Severe Pain (7 - 10)    T(C): 36.2 (01-25-19 @ 06:11), Max: 36.7 (01-24-19 @ 16:19)  HR: 71 (01-25-19 @ 06:11) (64 - 73)  BP: 125/58 (01-25-19 @ 06:11) (106/59 - 139/66)  RR: 18 (01-25-19 @ 06:11) (18 - 18)  SpO2: 95% (01-25-19 @ 06:11) (88% - 95%)    I&O's Summary    24 Jan 2019 07:01  -  25 Jan 2019 07:00  --------------------------------------------------------  IN: 480 mL / OUT: 900 mL / NET: -420 mL    PHYSICAL EXAM:  GENERAL: NAD, [place]  CHEST/LUNG: Clear to auscultation bilaterally; No wheeze  HEART: Regular rate and rhythm; SEJM; no rubs, or gallops  ABDOMEN: Soft, Nontender, Nondistended; Bowel sounds present  EXTREMITIES:   warm and well perfused, No clubbing, cyanosis, or edema  PSYCH: A&Ox1-2  NEUROLOGY: non-focal  SKIN: No rashes or lesions on visible skin   +hensley    LABS:                        9.6    11.54 )-----------( 469      ( 25 Jan 2019 03:50 )             31.7     01-25    137  |  101  |  15  ----------------------------<  109<H>  5.5<H>   |  23  |  0.62    Ca    8.5      25 Jan 2019 03:40  Mg     2.1     01-25      Consultant(s) Notes Reviewed:    Care Discussed with Consultants/Other Providers: Patient is a 89y old  Female who presents with a chief complaint of weakness and diarrhea    SUBJECTIVE / OVERNIGHT EVENTS:    Pt looks better today, OOB to chair per aide assisted by RN and PCA)   Today she reports feeling well, denies CP, SOB, f/c  She denies coughing (aide report she is coughing)   In eating her breakfast (cereal, banana)     Room changed, now back on tele since 7, dc tele    MEDICATIONS  (STANDING):  ALBUTerol/ipratropium for Nebulization 3 milliLiter(s) Nebulizer every 8 hours  amiodarone    Tablet 100 milliGRAM(s) Oral daily  aspirin  chewable 81 milliGRAM(s) Oral daily  atorvastatin 10 milliGRAM(s) Oral at bedtime  escitalopram 5 milliGRAM(s) Oral daily  furosemide    Tablet 20 milliGRAM(s) Oral daily  heparin  Injectable 5000 Unit(s) SubCutaneous every 12 hours  levothyroxine 100 MICROGram(s) Oral daily  multivitamin 1 Tablet(s) Oral daily  QUEtiapine 25 milliGRAM(s) Oral <User Schedule>  senna 2 Tablet(s) Oral at bedtime  sodium chloride 0.9% lock flush 3 milliLiter(s) IV Push every 8 hours  spironolactone 12.5 milliGRAM(s) Oral <User Schedule>    MEDICATIONS  (PRN):  acetaminophen   Tablet .. 650 milliGRAM(s) Oral every 6 hours PRN Mild Pain (1 - 3), Moderate Pain (4 - 6), Severe Pain (7 - 10)    T(C): 36.2 (01-25-19 @ 06:11), Max: 36.7 (01-24-19 @ 16:19)  HR: 71 (01-25-19 @ 06:11) (64 - 73)  BP: 125/58 (01-25-19 @ 06:11) (106/59 - 139/66)  RR: 18 (01-25-19 @ 06:11) (18 - 18)  SpO2: 95% (01-25-19 @ 06:11) (88% - 95%)    I&O's Summary    24 Jan 2019 07:01  -  25 Jan 2019 07:00  --------------------------------------------------------  IN: 480 mL / OUT: 900 mL / NET: -420 mL    PHYSICAL EXAM:  GENERAL: NAD, [place]  CHEST/LUNG: Clear to auscultation bilaterally; No wheeze  HEART: Regular rate and rhythm; SEJM; no rubs, or gallops  ABDOMEN: Soft, Nontender, Nondistended; Bowel sounds present  EXTREMITIES:   warm and well perfused, No clubbing, cyanosis, or edema  PSYCH: A&Ox1-2  NEUROLOGY: non-focal  SKIN: No rashes or lesions on visible skin   +hensley    LABS:                        9.6    11.54 )-----------( 469      ( 25 Jan 2019 03:50 )             31.7     01-25    137  |  101  |  15  ----------------------------<  109<H>  5.5<H>   |  23  |  0.62    Ca    8.5      25 Jan 2019 03:40  Mg     2.1     01-25      Consultant(s) Notes Reviewed:    Care Discussed with Consultants/Other Providers: Patient is a 89y old  Female who presents with a chief complaint of weakness and diarrhea    SUBJECTIVE / OVERNIGHT EVENTS:    Pt looks better today, OOB to chair (per aide assisted by RN and PCA)   Today she reports feeling well, denies CP, SOB, f/c  She denies coughing (aide report she is coughing)   In eating her breakfast (cereal, banana)     Room changed, now back on tele since 7, dc tele    MEDICATIONS  (STANDING):  ALBUTerol/ipratropium for Nebulization 3 milliLiter(s) Nebulizer every 8 hours  amiodarone    Tablet 100 milliGRAM(s) Oral daily  aspirin  chewable 81 milliGRAM(s) Oral daily  atorvastatin 10 milliGRAM(s) Oral at bedtime  escitalopram 5 milliGRAM(s) Oral daily  furosemide    Tablet 20 milliGRAM(s) Oral daily  heparin  Injectable 5000 Unit(s) SubCutaneous every 12 hours  levothyroxine 100 MICROGram(s) Oral daily  multivitamin 1 Tablet(s) Oral daily  QUEtiapine 25 milliGRAM(s) Oral <User Schedule>  senna 2 Tablet(s) Oral at bedtime  sodium chloride 0.9% lock flush 3 milliLiter(s) IV Push every 8 hours  spironolactone 12.5 milliGRAM(s) Oral <User Schedule>    MEDICATIONS  (PRN):  acetaminophen   Tablet .. 650 milliGRAM(s) Oral every 6 hours PRN Mild Pain (1 - 3), Moderate Pain (4 - 6), Severe Pain (7 - 10)    T(C): 36.2 (01-25-19 @ 06:11), Max: 36.7 (01-24-19 @ 16:19)  HR: 71 (01-25-19 @ 06:11) (64 - 73)  BP: 125/58 (01-25-19 @ 06:11) (106/59 - 139/66)  RR: 18 (01-25-19 @ 06:11) (18 - 18)  SpO2: 95% (01-25-19 @ 06:11) (88% - 95%)    I&O's Summary    24 Jan 2019 07:01  -  25 Jan 2019 07:00  --------------------------------------------------------  IN: 480 mL / OUT: 900 mL / NET: -420 mL    PHYSICAL EXAM:  GENERAL: NAD, [place]  CHEST/LUNG: Clear to auscultation bilaterally; No wheeze  HEART: Regular rate and rhythm; SEJM; no rubs, or gallops  ABDOMEN: Soft, Nontender, Nondistended; Bowel sounds present  EXTREMITIES:   warm and well perfused, No clubbing, cyanosis, or edema  PSYCH: A&Ox1-2  NEUROLOGY: non-focal  SKIN: No rashes or lesions on visible skin   +hensley    LABS:                        9.6    11.54 )-----------( 469      ( 25 Jan 2019 03:50 )             31.7     01-25    137  |  101  |  15  ----------------------------<  109<H>  5.5<H>   |  23  |  0.62    Ca    8.5      25 Jan 2019 03:40  Mg     2.1     01-25      Consultant(s) Notes Reviewed:    Care Discussed with Consultants/Other Providers:

## 2019-01-25 NOTE — PROGRESS NOTE ADULT - PROBLEM SELECTOR PLAN 1
Pulmonary edema & effusions per CT read suspect related to HF, has history of diastolic HF per our last TTE   - no events on tele, now off tele  - TTE reviewed    - s/p lasix 40 mg IV x 2 days, c/w lasix 20 mg PO daily   - Strict I&Os, daily weights  - BNP downtrended, CXR unchanged

## 2019-01-25 NOTE — PROGRESS NOTE ADULT - PROBLEM SELECTOR PLAN 10
SQH  DNR/DNI (MOLST)  OOB to chair; PT rec now [facility]  Declined home hospice, want [facility]  dispo planning to [facility]  care d/w CM/SW

## 2019-01-25 NOTE — PROGRESS NOTE ADULT - PROBLEM SELECTOR PLAN 4
Possibly from stool burden in rectum   - bowel regimen, assure regular BM (per stool count BM 1/24 and 1/25), no reports of dairrhea  - stool count

## 2019-01-25 NOTE — CHART NOTE - NSCHARTNOTEFT_GEN_A_CORE
Pt's family had concerns over patient being discharged with a hensley catheter and outpatient follow up.  It was explained that based on her recent episode of urinary retention (600cc), causing b/l hydronephrosis, her significant constipation and her debilitated state, she should continue to have the catheter until her condition improves.  A premature TOV would most likely lead to another episode of Urinary Retention.  In addition the patient has an outpatient follow up on 2/4/19 with Dr. Shine.  At that point a TOV can be considered and any other diagnostic work up such as Urodynamics can be discussed.

## 2019-01-25 NOTE — PROGRESS NOTE ADULT - PROBLEM SELECTOR PLAN 2
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  In August has similar retention issues, thought 2/2 pain, seen by urology.  - s/p straight cath protocol x3, retained, hensley placed on 1/23 with 600cc of UOP  - will need to d/w son re: hensley for home vs straight cath--thinking goley might be better option although has pulled out in past   - will need OP  f/u--arranged for 2/4 at [facility]

## 2019-01-26 LAB
ANION GAP SERPL CALC-SCNC: 12 MMO/L — SIGNIFICANT CHANGE UP (ref 7–14)
BACTERIA BLD CULT: SIGNIFICANT CHANGE UP
BACTERIA BLD CULT: SIGNIFICANT CHANGE UP
BASOPHILS # BLD AUTO: 0.04 K/UL — SIGNIFICANT CHANGE UP (ref 0–0.2)
BASOPHILS NFR BLD AUTO: 0.3 % — SIGNIFICANT CHANGE UP (ref 0–2)
BUN SERPL-MCNC: 21 MG/DL — SIGNIFICANT CHANGE UP (ref 7–23)
CALCIUM SERPL-MCNC: 8.5 MG/DL — SIGNIFICANT CHANGE UP (ref 8.4–10.5)
CHLORIDE SERPL-SCNC: 103 MMOL/L — SIGNIFICANT CHANGE UP (ref 98–107)
CO2 SERPL-SCNC: 25 MMOL/L — SIGNIFICANT CHANGE UP (ref 22–31)
CREAT SERPL-MCNC: 0.79 MG/DL — SIGNIFICANT CHANGE UP (ref 0.5–1.3)
EOSINOPHIL # BLD AUTO: 0.07 K/UL — SIGNIFICANT CHANGE UP (ref 0–0.5)
EOSINOPHIL NFR BLD AUTO: 0.5 % — SIGNIFICANT CHANGE UP (ref 0–6)
GLUCOSE SERPL-MCNC: 113 MG/DL — HIGH (ref 70–99)
HCT VFR BLD CALC: 30.4 % — LOW (ref 34.5–45)
HGB BLD-MCNC: 9.4 G/DL — LOW (ref 11.5–15.5)
IMM GRANULOCYTES NFR BLD AUTO: 3.4 % — HIGH (ref 0–1.5)
LYMPHOCYTES # BLD AUTO: 1.58 K/UL — SIGNIFICANT CHANGE UP (ref 1–3.3)
LYMPHOCYTES # BLD AUTO: 11.5 % — LOW (ref 13–44)
MAGNESIUM SERPL-MCNC: 2.1 MG/DL — SIGNIFICANT CHANGE UP (ref 1.6–2.6)
MCHC RBC-ENTMCNC: 29.9 PG — SIGNIFICANT CHANGE UP (ref 27–34)
MCHC RBC-ENTMCNC: 30.9 % — LOW (ref 32–36)
MCV RBC AUTO: 96.8 FL — SIGNIFICANT CHANGE UP (ref 80–100)
MONOCYTES # BLD AUTO: 1.19 K/UL — HIGH (ref 0–0.9)
MONOCYTES NFR BLD AUTO: 8.7 % — SIGNIFICANT CHANGE UP (ref 2–14)
NEUTROPHILS # BLD AUTO: 10.35 K/UL — HIGH (ref 1.8–7.4)
NEUTROPHILS NFR BLD AUTO: 75.6 % — SIGNIFICANT CHANGE UP (ref 43–77)
NRBC # FLD: 0.02 K/UL — LOW (ref 25–125)
PLATELET # BLD AUTO: 467 K/UL — HIGH (ref 150–400)
PMV BLD: 10.3 FL — SIGNIFICANT CHANGE UP (ref 7–13)
POTASSIUM SERPL-MCNC: 3.9 MMOL/L — SIGNIFICANT CHANGE UP (ref 3.5–5.3)
POTASSIUM SERPL-SCNC: 3.9 MMOL/L — SIGNIFICANT CHANGE UP (ref 3.5–5.3)
RBC # BLD: 3.14 M/UL — LOW (ref 3.8–5.2)
RBC # FLD: 15.3 % — HIGH (ref 10.3–14.5)
SODIUM SERPL-SCNC: 140 MMOL/L — SIGNIFICANT CHANGE UP (ref 135–145)
WBC # BLD: 13.69 K/UL — HIGH (ref 3.8–10.5)
WBC # FLD AUTO: 13.69 K/UL — HIGH (ref 3.8–10.5)

## 2019-01-26 PROCEDURE — 99232 SBSQ HOSP IP/OBS MODERATE 35: CPT

## 2019-01-26 RX ADMIN — HEPARIN SODIUM 5000 UNIT(S): 5000 INJECTION INTRAVENOUS; SUBCUTANEOUS at 18:12

## 2019-01-26 RX ADMIN — HEPARIN SODIUM 5000 UNIT(S): 5000 INJECTION INTRAVENOUS; SUBCUTANEOUS at 06:11

## 2019-01-26 RX ADMIN — Medication 20 MILLIGRAM(S): at 06:11

## 2019-01-26 RX ADMIN — AMIODARONE HYDROCHLORIDE 100 MILLIGRAM(S): 400 TABLET ORAL at 06:11

## 2019-01-26 RX ADMIN — Medication 3 MILLILITER(S): at 15:58

## 2019-01-26 RX ADMIN — Medication 100 MICROGRAM(S): at 06:11

## 2019-01-26 RX ADMIN — QUETIAPINE FUMARATE 25 MILLIGRAM(S): 200 TABLET, FILM COATED ORAL at 18:59

## 2019-01-26 RX ADMIN — SODIUM CHLORIDE 3 MILLILITER(S): 9 INJECTION INTRAMUSCULAR; INTRAVENOUS; SUBCUTANEOUS at 21:38

## 2019-01-26 RX ADMIN — SODIUM CHLORIDE 3 MILLILITER(S): 9 INJECTION INTRAMUSCULAR; INTRAVENOUS; SUBCUTANEOUS at 16:25

## 2019-01-26 RX ADMIN — Medication 3 MILLILITER(S): at 07:57

## 2019-01-26 RX ADMIN — SODIUM CHLORIDE 3 MILLILITER(S): 9 INJECTION INTRAMUSCULAR; INTRAVENOUS; SUBCUTANEOUS at 06:06

## 2019-01-26 NOTE — PROGRESS NOTE ADULT - PROBLEM SELECTOR PLAN 4
Possibly from stool burden in rectum   - bowel regimen, assure regular BM (per stool count BM 1/24 and 1/25), no reports of diarrhea

## 2019-01-26 NOTE — PROGRESS NOTE ADULT - PROBLEM SELECTOR PLAN 6
Seen on CT abdomen.  Seen in previous imaging.  Now enlarging.  Per previous provider documentation, this was discussed with son who stated it was being monitored as OP for several years and that given age he was told cardiac issues preclude her from surgery and she had stated she wouldn't want further intervention regardless  - monitor

## 2019-01-26 NOTE — PROGRESS NOTE ADULT - PROBLEM SELECTOR PLAN 1
Pulmonary edema & effusions per CT read suspect related to HF, has history of diastolic HF per our last TTE   - no events on tele, now off tele  - TTE reviewed    - c/w lasix 20 mg PO daily   - Strict I&Os, daily weights

## 2019-01-26 NOTE — PROGRESS NOTE ADULT - PROBLEM SELECTOR PLAN 2
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  In August has similar retention issues, thought 2/2 pain, seen by urology.  - s/p straight cath protocol x3, retained, hensley placed on 1/23 with 600cc of UOP  - Evaluated by Urology as inpatient for retention; recommend continuing Hensley catheter  - will need OP  f/u--arranged for 2/4 at [facility]

## 2019-01-26 NOTE — PROGRESS NOTE ADULT - SUBJECTIVE AND OBJECTIVE BOX
Dirk Mullins M.D. Pager Number [phone]    Patient is a 89y old  Female who presents with a chief complaint of weakness and diarrhea (25 Jan 2019 12:16)      SUBJECTIVE / OVERNIGHT EVENTS:  Pt seen and examined at bedside in the presence of HHA. No acute events overnight.  Pt denies cp, palpitations, sob, abd pain, N/V, fever, chills.     ROS:  All other review of systems negative    Allergies    No Known Allergies    Intolerances        MEDICATIONS  (STANDING):  ALBUTerol/ipratropium for Nebulization 3 milliLiter(s) Nebulizer every 8 hours  amiodarone    Tablet 100 milliGRAM(s) Oral daily  aspirin  chewable 81 milliGRAM(s) Oral daily  atorvastatin 10 milliGRAM(s) Oral at bedtime  escitalopram 5 milliGRAM(s) Oral daily  furosemide    Tablet 20 milliGRAM(s) Oral daily  heparin  Injectable 5000 Unit(s) SubCutaneous every 12 hours  levothyroxine 100 MICROGram(s) Oral daily  multivitamin 1 Tablet(s) Oral daily  QUEtiapine 25 milliGRAM(s) Oral <User Schedule>  senna 2 Tablet(s) Oral at bedtime  sodium chloride 0.9% lock flush 3 milliLiter(s) IV Push every 8 hours  spironolactone 12.5 milliGRAM(s) Oral <User Schedule>    MEDICATIONS  (PRN):  acetaminophen   Tablet .. 650 milliGRAM(s) Oral every 6 hours PRN Mild Pain (1 - 3), Moderate Pain (4 - 6), Severe Pain (7 - 10)      Vital Signs Last 24 Hrs  T(C): 36.3 (26 Jan 2019 15:10), Max: 36.7 (26 Jan 2019 06:04)  T(F): 97.4 (26 Jan 2019 15:10), Max: 98.1 (26 Jan 2019 06:04)  HR: 68 (26 Jan 2019 15:58) (63 - 81)  BP: 110/56 (26 Jan 2019 15:10) (110/56 - 144/59)  BP(mean): --  RR: 18 (26 Jan 2019 15:10) (18 - 18)  SpO2: 98% (26 Jan 2019 15:58) (95% - 98%)  CAPILLARY BLOOD GLUCOSE        I&O's Summary    25 Jan 2019 07:01  -  26 Jan 2019 07:00  --------------------------------------------------------  IN: 118 mL / OUT: 350 mL / NET: -232 mL        PHYSICAL EXAM:  GENERAL: NAD, [place]  HEAD:  Atraumatic, Normocephalic  EYES: EOMI, PERRLA, conjunctiva and sclera clear  NECK: Supple, No JVD  CHEST/LUNG: Clear to auscultation bilaterally; No wheeze  HEART: Regular rate and rhythm; No murmurs, rubs, or gallops  ABDOMEN: Soft, Nontender, Nondistended; Bowel sounds present  : Bobby in place  EXTREMITIES:  2+ Peripheral Pulses, No clubbing, cyanosis, or edema  NEUROLOGY: AAOx1-2, non-focal  PSYCH: calm  SKIN: No rashes or lesions    LABS:                        9.4    13.69 )-----------( 467      ( 26 Jan 2019 06:30 )             30.4     01-26    140  |  103  |  21  ----------------------------<  113<H>  3.9   |  25  |  0.79    Ca    8.5      26 Jan 2019 06:30  Mg     2.1     01-26

## 2019-01-27 LAB
ANION GAP SERPL CALC-SCNC: 14 MMO/L — SIGNIFICANT CHANGE UP (ref 7–14)
BASOPHILS # BLD AUTO: 0.04 K/UL — SIGNIFICANT CHANGE UP (ref 0–0.2)
BASOPHILS NFR BLD AUTO: 0.3 % — SIGNIFICANT CHANGE UP (ref 0–2)
BUN SERPL-MCNC: 20 MG/DL — SIGNIFICANT CHANGE UP (ref 7–23)
CALCIUM SERPL-MCNC: 8.6 MG/DL — SIGNIFICANT CHANGE UP (ref 8.4–10.5)
CHLORIDE SERPL-SCNC: 102 MMOL/L — SIGNIFICANT CHANGE UP (ref 98–107)
CO2 SERPL-SCNC: 21 MMOL/L — LOW (ref 22–31)
CREAT SERPL-MCNC: 0.68 MG/DL — SIGNIFICANT CHANGE UP (ref 0.5–1.3)
EOSINOPHIL # BLD AUTO: 0.09 K/UL — SIGNIFICANT CHANGE UP (ref 0–0.5)
EOSINOPHIL NFR BLD AUTO: 0.7 % — SIGNIFICANT CHANGE UP (ref 0–6)
GLUCOSE SERPL-MCNC: 111 MG/DL — HIGH (ref 70–99)
HCT VFR BLD CALC: 30.2 % — LOW (ref 34.5–45)
HGB BLD-MCNC: 9.3 G/DL — LOW (ref 11.5–15.5)
IMM GRANULOCYTES NFR BLD AUTO: 4.5 % — HIGH (ref 0–1.5)
LYMPHOCYTES # BLD AUTO: 1.49 K/UL — SIGNIFICANT CHANGE UP (ref 1–3.3)
LYMPHOCYTES # BLD AUTO: 11.8 % — LOW (ref 13–44)
MAGNESIUM SERPL-MCNC: 2.2 MG/DL — SIGNIFICANT CHANGE UP (ref 1.6–2.6)
MCHC RBC-ENTMCNC: 30.2 PG — SIGNIFICANT CHANGE UP (ref 27–34)
MCHC RBC-ENTMCNC: 30.8 % — LOW (ref 32–36)
MCV RBC AUTO: 98.1 FL — SIGNIFICANT CHANGE UP (ref 80–100)
MONOCYTES # BLD AUTO: 1.3 K/UL — HIGH (ref 0–0.9)
MONOCYTES NFR BLD AUTO: 10.3 % — SIGNIFICANT CHANGE UP (ref 2–14)
NEUTROPHILS # BLD AUTO: 9.09 K/UL — HIGH (ref 1.8–7.4)
NEUTROPHILS NFR BLD AUTO: 72.4 % — SIGNIFICANT CHANGE UP (ref 43–77)
NRBC # FLD: 0 K/UL — LOW (ref 25–125)
PLATELET # BLD AUTO: 468 K/UL — HIGH (ref 150–400)
PMV BLD: 10.3 FL — SIGNIFICANT CHANGE UP (ref 7–13)
POTASSIUM SERPL-MCNC: 4.3 MMOL/L — SIGNIFICANT CHANGE UP (ref 3.5–5.3)
POTASSIUM SERPL-SCNC: 4.3 MMOL/L — SIGNIFICANT CHANGE UP (ref 3.5–5.3)
RBC # BLD: 3.08 M/UL — LOW (ref 3.8–5.2)
RBC # FLD: 15.5 % — HIGH (ref 10.3–14.5)
SODIUM SERPL-SCNC: 137 MMOL/L — SIGNIFICANT CHANGE UP (ref 135–145)
WBC # BLD: 12.58 K/UL — HIGH (ref 3.8–10.5)
WBC # FLD AUTO: 12.58 K/UL — HIGH (ref 3.8–10.5)

## 2019-01-27 PROCEDURE — 99232 SBSQ HOSP IP/OBS MODERATE 35: CPT

## 2019-01-27 RX ORDER — SODIUM CHLORIDE 9 MG/ML
500 INJECTION INTRAMUSCULAR; INTRAVENOUS; SUBCUTANEOUS ONCE
Qty: 0 | Refills: 0 | Status: COMPLETED | OUTPATIENT
Start: 2019-01-27 | End: 2019-01-27

## 2019-01-27 RX ADMIN — Medication 3 MILLILITER(S): at 07:19

## 2019-01-27 RX ADMIN — SODIUM CHLORIDE 3 MILLILITER(S): 9 INJECTION INTRAMUSCULAR; INTRAVENOUS; SUBCUTANEOUS at 12:22

## 2019-01-27 RX ADMIN — SODIUM CHLORIDE 3 MILLILITER(S): 9 INJECTION INTRAMUSCULAR; INTRAVENOUS; SUBCUTANEOUS at 21:49

## 2019-01-27 RX ADMIN — ATORVASTATIN CALCIUM 10 MILLIGRAM(S): 80 TABLET, FILM COATED ORAL at 21:49

## 2019-01-27 RX ADMIN — SODIUM CHLORIDE 3 MILLILITER(S): 9 INJECTION INTRAMUSCULAR; INTRAVENOUS; SUBCUTANEOUS at 05:44

## 2019-01-27 RX ADMIN — Medication 20 MILLIGRAM(S): at 05:45

## 2019-01-27 RX ADMIN — HEPARIN SODIUM 5000 UNIT(S): 5000 INJECTION INTRAVENOUS; SUBCUTANEOUS at 05:45

## 2019-01-27 RX ADMIN — SODIUM CHLORIDE 500 MILLILITER(S): 9 INJECTION INTRAMUSCULAR; INTRAVENOUS; SUBCUTANEOUS at 16:25

## 2019-01-27 RX ADMIN — Medication 3 MILLILITER(S): at 16:15

## 2019-01-27 RX ADMIN — Medication 100 MICROGRAM(S): at 05:45

## 2019-01-27 RX ADMIN — Medication 3 MILLILITER(S): at 22:01

## 2019-01-27 RX ADMIN — AMIODARONE HYDROCHLORIDE 100 MILLIGRAM(S): 400 TABLET ORAL at 05:45

## 2019-01-27 RX ADMIN — QUETIAPINE FUMARATE 25 MILLIGRAM(S): 200 TABLET, FILM COATED ORAL at 18:30

## 2019-01-27 RX ADMIN — SENNA PLUS 2 TABLET(S): 8.6 TABLET ORAL at 21:50

## 2019-01-27 NOTE — PROGRESS NOTE ADULT - PROBLEM SELECTOR PLAN 2
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  In August has similar retention issues, thought 2/2 pain, seen by urology.  -s/p straight cath protocol x3, retained, hensley placed on 1/23 with 600cc of UOP  -Evaluated by Urology as inpatient for retention; recommend continuing Hensley catheter  -will need OP  f/u--arranged for 2/4 at [facility]

## 2019-01-27 NOTE — PROGRESS NOTE ADULT - PROBLEM SELECTOR PLAN 4
Possibly from stool burden in rectum   - bowel regimen, assure regular BM (per stool count BM 1/24 and 1/25), no reports of diarrhea  -Follow up with Abdominal xray

## 2019-01-27 NOTE — PROGRESS NOTE ADULT - SUBJECTIVE AND OBJECTIVE BOX
Dirk Mullins M.D. Pager Number [phone]    Patient is a 89y old  Female who presents with a chief complaint of weakness and diarrhea (26 Jan 2019 16:36)      SUBJECTIVE / OVERNIGHT EVENTS:  Pt seen and examined at bedside in the presence of pt's son and daughter in law and HHA. No acute events overnight. Concern for constipation as pt has not had a BM.   Pt denies cp, palpitations, sob, abd pain, N/V, fever, chills.    ROS:  All other review of systems negative    Allergies    No Known Allergies    Intolerances        MEDICATIONS  (STANDING):  ALBUTerol/ipratropium for Nebulization 3 milliLiter(s) Nebulizer every 8 hours  amiodarone    Tablet 100 milliGRAM(s) Oral daily  aspirin  chewable 81 milliGRAM(s) Oral daily  atorvastatin 10 milliGRAM(s) Oral at bedtime  escitalopram 5 milliGRAM(s) Oral daily  furosemide    Tablet 20 milliGRAM(s) Oral daily  heparin  Injectable 5000 Unit(s) SubCutaneous every 12 hours  levothyroxine 100 MICROGram(s) Oral daily  multivitamin 1 Tablet(s) Oral daily  QUEtiapine 25 milliGRAM(s) Oral <User Schedule>  senna 2 Tablet(s) Oral at bedtime  sodium chloride 0.9% lock flush 3 milliLiter(s) IV Push every 8 hours  spironolactone 12.5 milliGRAM(s) Oral <User Schedule>    MEDICATIONS  (PRN):  acetaminophen   Tablet .. 650 milliGRAM(s) Oral every 6 hours PRN Mild Pain (1 - 3), Moderate Pain (4 - 6), Severe Pain (7 - 10)      Vital Signs Last 24 Hrs  T(C): 36.6 (27 Jan 2019 05:43), Max: 36.6 (27 Jan 2019 05:43)  T(F): 97.8 (27 Jan 2019 05:43), Max: 97.8 (27 Jan 2019 05:43)  HR: 65 (27 Jan 2019 07:20) (65 - 71)  BP: 117/55 (27 Jan 2019 05:43) (117/55 - 119/57)  BP(mean): --  RR: 18 (27 Jan 2019 05:43) (18 - 18)  SpO2: 96% (27 Jan 2019 07:20) (95% - 98%)  CAPILLARY BLOOD GLUCOSE        I&O's Summary    26 Jan 2019 07:01  -  27 Jan 2019 07:00  --------------------------------------------------------  IN: 1125 mL / OUT: 1000 mL / NET: 125 mL        PHYSICAL EXAM:  GENERAL: NAD, [place]  NECK: Supple, No JVD  CHEST/LUNG: Clear to auscultation bilaterally; No wheeze  HEART: Regular rate and rhythm; No murmurs, rubs, or gallops  ABDOMEN: Soft, Nontender, Nondistended; Bowel sounds present  : Bobby in place  EXTREMITIES:  2+ Peripheral Pulses, No clubbing, cyanosis, or edema  NEUROLOGY: AAOx1-2, non-focal  PSYCH: calm  SKIN: No rashes or lesions    LABS:                        9.3    12.58 )-----------( 468      ( 27 Jan 2019 06:15 )             30.2     01-27    137  |  102  |  20  ----------------------------<  111<H>  4.3   |  21<L>  |  0.68    Ca    8.6      27 Jan 2019 06:15  Mg     2.2     01-27

## 2019-01-27 NOTE — PROGRESS NOTE ADULT - PROBLEM SELECTOR PLAN 10
SQH  DNR/DNI (MOLST)  OOB to chair; PT rec now [facility]  dispo planning to [facility]  care discussed with pt's son

## 2019-01-27 NOTE — PROGRESS NOTE ADULT - PROBLEM SELECTOR PLAN 1
Pulmonary edema & effusions per CT read suspect related to HF, has history of diastolic HF per our last TTE   -TTE reviewed    -c/w lasix 20 mg PO daily   -Strict I&Os, daily weights

## 2019-01-28 LAB
ANION GAP SERPL CALC-SCNC: 12 MMO/L — SIGNIFICANT CHANGE UP (ref 7–14)
BASOPHILS # BLD AUTO: 0.04 K/UL — SIGNIFICANT CHANGE UP (ref 0–0.2)
BASOPHILS NFR BLD AUTO: 0.3 % — SIGNIFICANT CHANGE UP (ref 0–2)
BUN SERPL-MCNC: 18 MG/DL — SIGNIFICANT CHANGE UP (ref 7–23)
CALCIUM SERPL-MCNC: 8.3 MG/DL — LOW (ref 8.4–10.5)
CHLORIDE SERPL-SCNC: 103 MMOL/L — SIGNIFICANT CHANGE UP (ref 98–107)
CO2 SERPL-SCNC: 24 MMOL/L — SIGNIFICANT CHANGE UP (ref 22–31)
CREAT SERPL-MCNC: 0.67 MG/DL — SIGNIFICANT CHANGE UP (ref 0.5–1.3)
EOSINOPHIL # BLD AUTO: 0.15 K/UL — SIGNIFICANT CHANGE UP (ref 0–0.5)
EOSINOPHIL NFR BLD AUTO: 1.3 % — SIGNIFICANT CHANGE UP (ref 0–6)
GLUCOSE SERPL-MCNC: 126 MG/DL — HIGH (ref 70–99)
HCT VFR BLD CALC: 27.6 % — LOW (ref 34.5–45)
HGB BLD-MCNC: 8.6 G/DL — LOW (ref 11.5–15.5)
IMM GRANULOCYTES NFR BLD AUTO: 4.2 % — HIGH (ref 0–1.5)
LYMPHOCYTES # BLD AUTO: 1.48 K/UL — SIGNIFICANT CHANGE UP (ref 1–3.3)
LYMPHOCYTES # BLD AUTO: 12.8 % — LOW (ref 13–44)
MAGNESIUM SERPL-MCNC: 2.2 MG/DL — SIGNIFICANT CHANGE UP (ref 1.6–2.6)
MCHC RBC-ENTMCNC: 30.5 PG — SIGNIFICANT CHANGE UP (ref 27–34)
MCHC RBC-ENTMCNC: 31.2 % — LOW (ref 32–36)
MCV RBC AUTO: 97.9 FL — SIGNIFICANT CHANGE UP (ref 80–100)
MONOCYTES # BLD AUTO: 0.98 K/UL — HIGH (ref 0–0.9)
MONOCYTES NFR BLD AUTO: 8.5 % — SIGNIFICANT CHANGE UP (ref 2–14)
NEUTROPHILS # BLD AUTO: 8.41 K/UL — HIGH (ref 1.8–7.4)
NEUTROPHILS NFR BLD AUTO: 72.9 % — SIGNIFICANT CHANGE UP (ref 43–77)
NRBC # FLD: 0 K/UL — LOW (ref 25–125)
PLATELET # BLD AUTO: 411 K/UL — HIGH (ref 150–400)
PMV BLD: 10.2 FL — SIGNIFICANT CHANGE UP (ref 7–13)
POTASSIUM SERPL-MCNC: 3.6 MMOL/L — SIGNIFICANT CHANGE UP (ref 3.5–5.3)
POTASSIUM SERPL-SCNC: 3.6 MMOL/L — SIGNIFICANT CHANGE UP (ref 3.5–5.3)
RBC # BLD: 2.82 M/UL — LOW (ref 3.8–5.2)
RBC # FLD: 15.5 % — HIGH (ref 10.3–14.5)
SODIUM SERPL-SCNC: 139 MMOL/L — SIGNIFICANT CHANGE UP (ref 135–145)
WBC # BLD: 11.55 K/UL — HIGH (ref 3.8–10.5)
WBC # FLD AUTO: 11.55 K/UL — HIGH (ref 3.8–10.5)

## 2019-01-28 PROCEDURE — 99232 SBSQ HOSP IP/OBS MODERATE 35: CPT

## 2019-01-28 PROCEDURE — 74018 RADEX ABDOMEN 1 VIEW: CPT | Mod: 26

## 2019-01-28 RX ORDER — DOCUSATE SODIUM 100 MG
300 CAPSULE ORAL DAILY
Qty: 0 | Refills: 0 | Status: DISCONTINUED | OUTPATIENT
Start: 2019-01-28 | End: 2019-01-31

## 2019-01-28 RX ORDER — FUROSEMIDE 40 MG
20 TABLET ORAL DAILY
Qty: 0 | Refills: 0 | Status: DISCONTINUED | OUTPATIENT
Start: 2019-01-29 | End: 2019-02-04

## 2019-01-28 RX ORDER — DOCUSATE SODIUM 100 MG
100 CAPSULE ORAL
Qty: 0 | Refills: 0 | Status: DISCONTINUED | OUTPATIENT
Start: 2019-01-28 | End: 2019-01-28

## 2019-01-28 RX ORDER — SODIUM CHLORIDE 9 MG/ML
1000 INJECTION INTRAMUSCULAR; INTRAVENOUS; SUBCUTANEOUS
Qty: 0 | Refills: 0 | Status: DISCONTINUED | OUTPATIENT
Start: 2019-01-28 | End: 2019-02-04

## 2019-01-28 RX ORDER — POLYETHYLENE GLYCOL 3350 17 G/17G
17 POWDER, FOR SOLUTION ORAL DAILY
Qty: 0 | Refills: 0 | Status: DISCONTINUED | OUTPATIENT
Start: 2019-01-28 | End: 2019-01-31

## 2019-01-28 RX ADMIN — Medication 100 MICROGRAM(S): at 05:18

## 2019-01-28 RX ADMIN — HEPARIN SODIUM 5000 UNIT(S): 5000 INJECTION INTRAVENOUS; SUBCUTANEOUS at 05:19

## 2019-01-28 RX ADMIN — HEPARIN SODIUM 5000 UNIT(S): 5000 INJECTION INTRAVENOUS; SUBCUTANEOUS at 18:04

## 2019-01-28 RX ADMIN — SENNA PLUS 2 TABLET(S): 8.6 TABLET ORAL at 22:53

## 2019-01-28 RX ADMIN — SODIUM CHLORIDE 50 MILLILITER(S): 9 INJECTION INTRAMUSCULAR; INTRAVENOUS; SUBCUTANEOUS at 18:48

## 2019-01-28 RX ADMIN — ESCITALOPRAM OXALATE 5 MILLIGRAM(S): 10 TABLET, FILM COATED ORAL at 13:53

## 2019-01-28 RX ADMIN — AMIODARONE HYDROCHLORIDE 100 MILLIGRAM(S): 400 TABLET ORAL at 05:19

## 2019-01-28 RX ADMIN — ATORVASTATIN CALCIUM 10 MILLIGRAM(S): 80 TABLET, FILM COATED ORAL at 22:53

## 2019-01-28 RX ADMIN — Medication 300 MILLIGRAM(S): at 22:53

## 2019-01-28 RX ADMIN — Medication 1 TABLET(S): at 13:53

## 2019-01-28 RX ADMIN — Medication 81 MILLIGRAM(S): at 13:52

## 2019-01-28 RX ADMIN — QUETIAPINE FUMARATE 25 MILLIGRAM(S): 200 TABLET, FILM COATED ORAL at 18:01

## 2019-01-28 RX ADMIN — SODIUM CHLORIDE 3 MILLILITER(S): 9 INJECTION INTRAMUSCULAR; INTRAVENOUS; SUBCUTANEOUS at 22:52

## 2019-01-28 RX ADMIN — SODIUM CHLORIDE 3 MILLILITER(S): 9 INJECTION INTRAMUSCULAR; INTRAVENOUS; SUBCUTANEOUS at 05:18

## 2019-01-28 RX ADMIN — SODIUM CHLORIDE 3 MILLILITER(S): 9 INJECTION INTRAMUSCULAR; INTRAVENOUS; SUBCUTANEOUS at 14:08

## 2019-01-28 NOTE — PROGRESS NOTE ADULT - SUBJECTIVE AND OBJECTIVE BOX
Dirk Mullins M.D. Pager Number [phone]    Patient is a 89y old  Female who presents with a chief complaint of weakness and diarrhea (26 Jan 2019 16:36)      SUBJECTIVE / OVERNIGHT EVENTS:      MEDICATIONS  (STANDING):  ALBUTerol/ipratropium for Nebulization 3 milliLiter(s) Nebulizer every 8 hours  amiodarone    Tablet 100 milliGRAM(s) Oral daily  aspirin  chewable 81 milliGRAM(s) Oral daily  atorvastatin 10 milliGRAM(s) Oral at bedtime  escitalopram 5 milliGRAM(s) Oral daily  heparin  Injectable 5000 Unit(s) SubCutaneous every 12 hours  levothyroxine 100 MICROGram(s) Oral daily  multivitamin 1 Tablet(s) Oral daily  QUEtiapine 25 milliGRAM(s) Oral <User Schedule>  senna 2 Tablet(s) Oral at bedtime  sodium chloride 0.9% lock flush 3 milliLiter(s) IV Push every 8 hours  spironolactone 12.5 milliGRAM(s) Oral <User Schedule>    MEDICATIONS  (PRN):  acetaminophen   Tablet .. 650 milliGRAM(s) Oral every 6 hours PRN Mild Pain (1 - 3), Moderate Pain (4 - 6), Severe Pain (7 - 10)      Vital Signs Last 24 Hrs  T(C): 36.6 (28 Jan 2019 05:17), Max: 36.6 (28 Jan 2019 05:17)  T(F): 97.9 (28 Jan 2019 05:17), Max: 97.9 (28 Jan 2019 05:17)  HR: 63 (28 Jan 2019 05:17) (63 - 79)  BP: 114/60 (28 Jan 2019 05:17) (100/52 - 114/60)  BP(mean): 85 (28 Jan 2019 05:17) (66 - 85)  RR: 16 (28 Jan 2019 05:17) (16 - 18)  SpO2: 95% (28 Jan 2019 05:17) (94% - 97%)      I&O's Summary    26 Jan 2019 07:01  -  27 Jan 2019 07:00  --------------------------------------------------------  IN: 1125 mL / OUT: 1000 mL / NET: 125 mL        PHYSICAL EXAM:  GENERAL: NAD, [place]  NECK: Supple, No JVD  CHEST/LUNG: Clear to auscultation bilaterally; No wheeze  HEART: Regular rate and rhythm; No murmurs, rubs, or gallops  ABDOMEN: Soft, Nontender, Nondistended; Bowel sounds present  : Bobby in place  EXTREMITIES:  2+ Peripheral Pulses, No clubbing, cyanosis, or edema  NEUROLOGY: AAOx1-2, non-focal  PSYCH: calm  SKIN: No rashes or lesions    LABS:                                         8.6    11.55 )-----------( 411      ( 28 Jan 2019 06:01 )             27.6   01-28    139  |  103  |  18  ----------------------------<  126<H>  3.6   |  24  |  0.67    Ca    8.3<L>      28 Jan 2019 06:01  Mg     2.2     01-28 Dirk Mullins M.D. Pager Number [phone]    Patient is a 89y old  Female who presents with a chief complaint of weakness and diarrhea (26 Jan 2019 16:36)      SUBJECTIVE / OVERNIGHT EVENTS: c/o constipation - No BM in 2-3 days  As per pt, she usually has bouts of constipation once every 2-3months which resolves with an enema   DW  Son- As per son, pt does not tolerate enema and would prefer not to give it to her   Son concerned pt not eating as much        MEDICATIONS  (STANDING):  ALBUTerol/ipratropium for Nebulization 3 milliLiter(s) Nebulizer every 8 hours  amiodarone    Tablet 100 milliGRAM(s) Oral daily  aspirin  chewable 81 milliGRAM(s) Oral daily  atorvastatin 10 milliGRAM(s) Oral at bedtime  escitalopram 5 milliGRAM(s) Oral daily  heparin  Injectable 5000 Unit(s) SubCutaneous every 12 hours  levothyroxine 100 MICROGram(s) Oral daily  multivitamin 1 Tablet(s) Oral daily  QUEtiapine 25 milliGRAM(s) Oral <User Schedule>  senna 2 Tablet(s) Oral at bedtime  sodium chloride 0.9% lock flush 3 milliLiter(s) IV Push every 8 hours  spironolactone 12.5 milliGRAM(s) Oral <User Schedule>    MEDICATIONS  (PRN):  acetaminophen   Tablet .. 650 milliGRAM(s) Oral every 6 hours PRN Mild Pain (1 - 3), Moderate Pain (4 - 6), Severe Pain (7 - 10)      Vital Signs Last 24 Hrs  T(C): 36.6 (28 Jan 2019 05:17), Max: 36.6 (28 Jan 2019 05:17)  T(F): 97.9 (28 Jan 2019 05:17), Max: 97.9 (28 Jan 2019 05:17)  HR: 63 (28 Jan 2019 05:17) (63 - 79)  BP: 114/60 (28 Jan 2019 05:17) (100/52 - 114/60)  BP(mean): 85 (28 Jan 2019 05:17) (66 - 85)  RR: 16 (28 Jan 2019 05:17) (16 - 18)  SpO2: 95% (28 Jan 2019 05:17) (94% - 97%)      I&O's Summary    26 Jan 2019 07:01  -  27 Jan 2019 07:00  --------------------------------------------------------  IN: 1125 mL / OUT: 1000 mL / NET: 125 mL        PHYSICAL EXAM:  GENERAL: NAD, [place]  NECK: Supple, No JVD  CHEST/LUNG: Clear to auscultation bilaterally; No wheeze  HEART: Regular rate and rhythm; No murmurs, rubs, or gallops  ABDOMEN: Soft, Nontender, Nondistended; Bowel sounds present  : Bobby in place  EXTREMITIES:  2+ Peripheral Pulses, No clubbing, cyanosis, or edema  NEUROLOGY: AAOx1-2, non-focal  PSYCH: calm  SKIN: No rashes or lesions    LABS:                                         8.6    11.55 )-----------( 411      ( 28 Jan 2019 06:01 )             27.6   01-28    139  |  103  |  18  ----------------------------<  126<H>  3.6   |  24  |  0.67    Ca    8.3<L>      28 Jan 2019 06:01  Mg     2.2     01-28

## 2019-01-29 ENCOUNTER — TRANSCRIPTION ENCOUNTER (OUTPATIENT)
Age: 84
End: 2019-01-29

## 2019-01-29 LAB
ANION GAP SERPL CALC-SCNC: 12 MMO/L — SIGNIFICANT CHANGE UP (ref 7–14)
BUN SERPL-MCNC: 18 MG/DL — SIGNIFICANT CHANGE UP (ref 7–23)
CALCIUM SERPL-MCNC: 8.1 MG/DL — LOW (ref 8.4–10.5)
CHLORIDE SERPL-SCNC: 105 MMOL/L — SIGNIFICANT CHANGE UP (ref 98–107)
CO2 SERPL-SCNC: 23 MMOL/L — SIGNIFICANT CHANGE UP (ref 22–31)
CREAT SERPL-MCNC: 0.71 MG/DL — SIGNIFICANT CHANGE UP (ref 0.5–1.3)
GLUCOSE SERPL-MCNC: 109 MG/DL — HIGH (ref 70–99)
HCT VFR BLD CALC: 29.1 % — LOW (ref 34.5–45)
HGB BLD-MCNC: 8.7 G/DL — LOW (ref 11.5–15.5)
MCHC RBC-ENTMCNC: 29.5 PG — SIGNIFICANT CHANGE UP (ref 27–34)
MCHC RBC-ENTMCNC: 29.9 % — LOW (ref 32–36)
MCV RBC AUTO: 98.6 FL — SIGNIFICANT CHANGE UP (ref 80–100)
NRBC # FLD: 0 K/UL — LOW (ref 25–125)
PLATELET # BLD AUTO: 431 K/UL — HIGH (ref 150–400)
PMV BLD: 10.5 FL — SIGNIFICANT CHANGE UP (ref 7–13)
POTASSIUM SERPL-MCNC: 3.2 MMOL/L — LOW (ref 3.5–5.3)
POTASSIUM SERPL-SCNC: 3.2 MMOL/L — LOW (ref 3.5–5.3)
RBC # BLD: 2.95 M/UL — LOW (ref 3.8–5.2)
RBC # FLD: 15.5 % — HIGH (ref 10.3–14.5)
SODIUM SERPL-SCNC: 140 MMOL/L — SIGNIFICANT CHANGE UP (ref 135–145)
WBC # BLD: 10.36 K/UL — SIGNIFICANT CHANGE UP (ref 3.8–10.5)
WBC # FLD AUTO: 10.36 K/UL — SIGNIFICANT CHANGE UP (ref 3.8–10.5)

## 2019-01-29 PROCEDURE — 99233 SBSQ HOSP IP/OBS HIGH 50: CPT

## 2019-01-29 RX ORDER — POTASSIUM CHLORIDE 20 MEQ
40 PACKET (EA) ORAL EVERY 4 HOURS
Qty: 0 | Refills: 0 | Status: DISCONTINUED | OUTPATIENT
Start: 2019-01-29 | End: 2019-01-29

## 2019-01-29 RX ORDER — CLARITHROMYCIN 500 MG
500 TABLET ORAL
Qty: 0 | Refills: 0 | Status: DISCONTINUED | OUTPATIENT
Start: 2019-01-29 | End: 2019-01-29

## 2019-01-29 RX ORDER — SENNA PLUS 8.6 MG/1
2 TABLET ORAL
Qty: 0 | Refills: 0 | COMMUNITY
Start: 2019-01-29

## 2019-01-29 RX ADMIN — Medication 1 TABLET(S): at 11:23

## 2019-01-29 RX ADMIN — Medication 100 MICROGRAM(S): at 05:51

## 2019-01-29 RX ADMIN — Medication 40 MILLIEQUIVALENT(S): at 10:47

## 2019-01-29 RX ADMIN — Medication 300 MILLIGRAM(S): at 11:23

## 2019-01-29 RX ADMIN — SPIRONOLACTONE 12.5 MILLIGRAM(S): 25 TABLET, FILM COATED ORAL at 11:22

## 2019-01-29 RX ADMIN — SODIUM CHLORIDE 3 MILLILITER(S): 9 INJECTION INTRAMUSCULAR; INTRAVENOUS; SUBCUTANEOUS at 05:51

## 2019-01-29 RX ADMIN — ESCITALOPRAM OXALATE 5 MILLIGRAM(S): 10 TABLET, FILM COATED ORAL at 11:23

## 2019-01-29 RX ADMIN — Medication 40 MILLIEQUIVALENT(S): at 17:31

## 2019-01-29 RX ADMIN — HEPARIN SODIUM 5000 UNIT(S): 5000 INJECTION INTRAVENOUS; SUBCUTANEOUS at 05:52

## 2019-01-29 RX ADMIN — Medication 81 MILLIGRAM(S): at 11:23

## 2019-01-29 RX ADMIN — AMIODARONE HYDROCHLORIDE 100 MILLIGRAM(S): 400 TABLET ORAL at 05:52

## 2019-01-29 RX ADMIN — SODIUM CHLORIDE 3 MILLILITER(S): 9 INJECTION INTRAMUSCULAR; INTRAVENOUS; SUBCUTANEOUS at 14:34

## 2019-01-29 RX ADMIN — Medication 20 MILLIGRAM(S): at 05:51

## 2019-01-29 RX ADMIN — Medication 3 MILLILITER(S): at 00:10

## 2019-01-29 RX ADMIN — Medication 100 MILLIGRAM(S): at 16:29

## 2019-01-29 RX ADMIN — Medication 40 MILLIEQUIVALENT(S): at 14:35

## 2019-01-29 RX ADMIN — Medication 3 MILLILITER(S): at 07:28

## 2019-01-29 NOTE — PROGRESS NOTE ADULT - SUBJECTIVE AND OBJECTIVE BOX
Dirk Mullins M.D. Pager Number [phone]    Patient is a 89y old  Female who presents with a chief complaint of weakness and diarrhea (26 Jan 2019 16:36)      SUBJECTIVE / OVERNIGHT EVENTS: Pt seen and examined by michele Brown at bedside  MALKA RN- pt was given a suppository overnight and had a BM overnight   This AM, pt denies rectal pain. States she feels well  As per aide- pt ate chicken for lunch yesterday and ate her breakfast this AM       MEDICATIONS  (STANDING):  ALBUTerol/ipratropium for Nebulization 3 milliLiter(s) Nebulizer every 8 hours  amiodarone    Tablet 100 milliGRAM(s) Oral daily  aspirin  chewable 81 milliGRAM(s) Oral daily  atorvastatin 10 milliGRAM(s) Oral at bedtime  escitalopram 5 milliGRAM(s) Oral daily  heparin  Injectable 5000 Unit(s) SubCutaneous every 12 hours  levothyroxine 100 MICROGram(s) Oral daily  multivitamin 1 Tablet(s) Oral daily  QUEtiapine 25 milliGRAM(s) Oral <User Schedule>  senna 2 Tablet(s) Oral at bedtime  sodium chloride 0.9% lock flush 3 milliLiter(s) IV Push every 8 hours  spironolactone 12.5 milliGRAM(s) Oral <User Schedule>    MEDICATIONS  (PRN):  acetaminophen   Tablet .. 650 milliGRAM(s) Oral every 6 hours PRN Mild Pain (1 - 3), Moderate Pain (4 - 6), Severe Pain (7 - 10)      Vital Signs Last 24 Hrs  T(C): 36.6 (28 Jan 2019 05:17), Max: 36.6 (28 Jan 2019 05:17)  T(F): 97.9 (28 Jan 2019 05:17), Max: 97.9 (28 Jan 2019 05:17)  HR: 63 (28 Jan 2019 05:17) (63 - 79)  BP: 114/60 (28 Jan 2019 05:17) (100/52 - 114/60)  BP(mean): 85 (28 Jan 2019 05:17) (66 - 85)  RR: 16 (28 Jan 2019 05:17) (16 - 18)  SpO2: 95% (28 Jan 2019 05:17) (94% - 97%)      I&O's Summary    26 Jan 2019 07:01  -  27 Jan 2019 07:00  --------------------------------------------------------  IN: 1125 mL / OUT: 1000 mL / NET: 125 mL        PHYSICAL EXAM:  GENERAL: NAD, [place]  NECK: Supple, No JVD  CHEST/LUNG: Clear to auscultation bilaterally; No wheeze  HEART: Regular rate and rhythm; No murmurs, rubs, or gallops  ABDOMEN: Soft, Nontender, Nondistended; Bowel sounds present  : Bobby in place  EXTREMITIES:  2+ Peripheral Pulses, No clubbing, cyanosis, or edema  NEUROLOGY: AAOx1-2, non-focal  PSYCH: calm  SKIN: No rashes or lesions    LABS:                                         8.6    11.55 )-----------( 411      ( 28 Jan 2019 06:01 )             27.6   01-28    139  |  103  |  18  ----------------------------<  126<H>  3.6   |  24  |  0.67    Ca    8.3<L>      28 Jan 2019 06:01  Mg     2.2     01-28

## 2019-01-29 NOTE — DISCHARGE NOTE ADULT - CARE PROVIDERS DIRECT ADDRESSES
[email or web address]

## 2019-01-29 NOTE — DISCHARGE NOTE ADULT - MEDICATION SUMMARY - MEDICATIONS TO TAKE
I will START or STAY ON the medications listed below when I get home from the hospital:    spironolactone 25 mg oral tablet  -- 0.5 tab(s) by mouth three times a week T/Th/Sun  -- Indication: For Htn    aspirin 81 mg oral tablet, chewable  -- 1 tab(s) by mouth once a day  -- Indication: For Cardiac    acetaminophen 325 mg oral tablet  -- 2 tab(s) by mouth every 6 hours, As needed, Mild Pain (1 - 3), Moderate Pain (4 - 6), Severe Pain (7 - 10)  -- Indication: For Pain    amiodarone 100 mg oral tablet  -- 1 tab(s) by mouth once a day  -- Indication: For Antiarrhytmic    Lexapro 5 mg oral tablet  -- 1 tab(s) by mouth once a day  -- Indication: For Antidepressant    atorvastatin 10 mg oral tablet  -- 1 tab(s) by mouth once a day  -- Indication: For Hld    SEROquel 25 mg oral tablet  -- 1 tab(s) by mouth once a day  -- Indication: For Antipsychotic    benzonatate 100 mg oral capsule  -- 1 cap(s) by mouth every 8 hours, As needed, Cough  -- Indication: For Antitussive    ipratropium-albuterol 0.5 mg-2.5 mg/3 mLinhalation solution  -- 3 milliliter(s) inhaled every 8 hours  -- Indication: For Shortness of breath    furosemide 20 mg oral tablet  -- 1 tab(s) by mouth once a day  -- Indication: For Htn    levothyroxine 100 mcg (0.1 mg) oral tablet  -- 1 tab(s) by mouth once a day  -- Indication: For thyroid    B Complex 50 oral tablet, extended release  -- 1 tab(s) by mouth once a day  -- Indication: For vitamin    Multiple Vitamins oral tablet  -- 1 tab(s) by mouth once a day  -- Indication: For vitamin

## 2019-01-29 NOTE — PROGRESS NOTE ADULT - PROBLEM SELECTOR PLAN 2
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  In August has similar retention issues, thought 2/2 pain, seen by urology.  -s/p straight cath protocol x3, retained, hensley placed on 1/23 with 600cc of UOP  -Evaluated by Urology as inpatient for retention; recommend continuing Hensley catheter  -will need OP  f/u--arranged for 2/4 at [facility]

## 2019-01-29 NOTE — PROGRESS NOTE ADULT - PROBLEM SELECTOR PLAN 10
SQ  [facility]  [facility]; PT rec now [facility]  dispo planning to [facility], but insurance denied Rehab  FU SW  care discussed with pt's son

## 2019-01-29 NOTE — DISCHARGE NOTE ADULT - MEDICATION SUMMARY - MEDICATIONS TO STOP TAKING
I will STOP taking the medications listed below when I get home from the hospital:    docusate sodium 100 mg oral capsule  -- 1 cap(s) by mouth 3 times a day    PreserVision AREDS 2 oral capsule  -- 1 cap(s) by mouth 2 times a day    senna oral tablet  -- 2 tab(s) by mouth once a day (at bedtime)    Linzess 145 mcg oral capsule  -- 1 cap(s) by mouth once a day    Cranberry oral capsule  -- 1  by mouth once a day    polyethylene glycol 3350 oral powder for reconstitution  -- 17 gram(s) by mouth once a day    LORazepam 0.5 mg oral tablet  -- 0.5 tab(s) by mouth once a day, As Needed    methenamine hippurate 1 g oral tablet  -- 0.5 tab(s) by mouth once a day

## 2019-01-29 NOTE — PROGRESS NOTE ADULT - PROBLEM SELECTOR PLAN 4
Possibly from stool burden in rectum   - bowel regimen, assure regular BM (per stool count BM 1/24 and 1/25), no reports of diarrhea  - Abdominal xray- non obstructive bowel pattern

## 2019-01-29 NOTE — DISCHARGE NOTE ADULT - CARE PROVIDER_API CALL
Sarah Shine)  Urology  [address] [place] [address]  Phone: [phone]  Fax: [phone]

## 2019-01-29 NOTE — CHART NOTE - NSCHARTNOTEFT_GEN_A_CORE
Source: Patient [ ]    Family [ ]     other [x ] RN, chart review    Pt seen for severe malnutrition follow-up. Pt is a 89 y/o F with hospital course significant for coronavirus and pulmonary edema. Hx of dementia (A&Ox2)- spoke with RN for f/u. Per RN, pt needs encouragement with meals; PO intake consistently <50%. 50% of breakfast tray eaten today. Pt with 100% intake of Ensure Enlive (2x daily). Pt was c/o constipation; BM o/n and currently on bowel regimen. No chewing or swallowing difficulties at this time with current diet.     Current Diet : Dysphagia 3, nectar consistency fluids. 1200 mL fluid restriction. Ensure Enlive 2x daily (700 smiley and 40 gm protein)   Reported:  [ ] nausea  [ ] vomiting [ ] diarrhea [x ] constipation  [ ]chewing problems [ ] swallowing issues  [ ] other:   PO intake:  < 50% [ x] 50-75% [ ]   % [ ]  other :  Current Weight: (1/29) 155.4 pounds. Pt has remained wt stable.     __________________ Pertinent Medications__________________   MEDICATIONS  (STANDING):  ALBUTerol/ipratropium for Nebulization 3 milliLiter(s) Nebulizer every 8 hours  amiodarone    Tablet 100 milliGRAM(s) Oral daily  aspirin  chewable 81 milliGRAM(s) Oral daily  atorvastatin 10 milliGRAM(s) Oral at bedtime  docusate sodium 300 milliGRAM(s) Oral daily  escitalopram 5 milliGRAM(s) Oral daily  furosemide    Tablet 20 milliGRAM(s) Oral daily  heparin  Injectable 5000 Unit(s) SubCutaneous every 12 hours  levothyroxine 100 MICROGram(s) Oral daily  multivitamin 1 Tablet(s) Oral daily  potassium chloride   Powder 40 milliEquivalent(s) Oral every 4 hours  QUEtiapine 25 milliGRAM(s) Oral <User Schedule>  senna 2 Tablet(s) Oral at bedtime  sodium chloride 0.9% lock flush 3 milliLiter(s) IV Push every 8 hours  sodium chloride 0.9%. 1000 milliLiter(s) (50 mL/Hr) IV Continuous <Continuous>  spironolactone 12.5 milliGRAM(s) Oral <User Schedule>    MEDICATIONS  (PRN):  acetaminophen   Tablet .. 650 milliGRAM(s) Oral every 6 hours PRN Mild Pain (1 - 3), Moderate Pain (4 - 6), Severe Pain (7 - 10)  bisacodyl Suppository 10 milliGRAM(s) Rectal daily PRN Constipation  polyethylene glycol 3350 17 Gram(s) Oral daily PRN Constipation      __________________ Pertinent Labs__________________   01-29 Na140 mmol/L Glu 109 mg/dL<H> K+ 3.2 mmol/L<L> Cr  0.71 mg/dL BUN 18 mg/dL 01-21 [id number] 5.1 % 01-21 Chol 75 mg/dL<L> LDL 24 mg/dL HDL 39 mg/dL<L> Trig 69 mg/dL        Skin: No edema, no pressure injuries noted.     Estimated Needs:   [x ] no change since previous assessment  [ ] recalculated:       Previous Nutrition Diagnosis: Severe malnutrition    [ ] Inadequate Energy Intake [ ]Inadequate Oral Intake [ ] Excessive Energy Intake   [ ] Underweight [ ] Increased Nutrient Needs [ ] Overweight/Obesity   [ ] Altered GI Function [ ] Unintended Weight Loss [ ] Food & Nutrition Related Knowledge Deficit [ ] Malnutrition     Nutrition Diagnosis is [ x ongoing- no further wt loss. 100% intake of Ensure. continued poor PO   [ ] resolved [ ] not applicable     New Nutrition Diagnosis: [x ] not applicable      Recommend  1. Continue current diet.   2. Continue Ensure Enlive 2x daily (700 smiley and 40 gm protein)  2. Please provide assistance and encouragement at meals.        Monitoring and Evaluation:     [ x] PO intake [ x] Tolerance to diet prescription [x ] weights [x ] follow up per protocol  [ ] other:

## 2019-01-29 NOTE — DISCHARGE NOTE ADULT - CARE PLAN
Principal Discharge DX:	Acute diastolic heart failure  Goal:	To relieve and prevent worsening symptoms associated with congestive heart failure, to improve quality of life, and to treat underlying conditions such as coronary heart disease, high blood pressure, or diabetes, and to maintain euvolemia.  Assessment and plan of treatment:	Euvolemic on examination. Found to have pulmonary edema & effusions per chest CT read, likely suspected/related to heart failure. Underwent transthoracic echo - showing moderate aortic stenosis. Mild aortic regurgitation. Grossly normal left ventricular systolic function. Patient to remain on Lasix 20 mg daily.  Low salt diet, fluid restriction to 1500 ml daily, monitor your fluid intake and weight daily, exercise as tolerated 30 minutes daily, and follow up with your physician within 7 days.  Secondary Diagnosis:	Deep vein thrombosis (DVT)  Goal:	s/p IVC filter.  Assessment and plan of treatment:	s/p IVC filter.  Secondary Diagnosis:	Diarrhea  Goal:	Resolved  Assessment and plan of treatment:	Resolved.  Secondary Diagnosis:	Kidney tumor  Goal:	Monitor, follow up as outpatient.  Assessment and plan of treatment:	A kidney tumor was seen on CT abdomen during this admission, though also seen on previous imaging, now enlarging.  This was discussed with patient's son who stated it was being monitored as outpatient for several years and that given age and cardiac issues, this would preclude her from surgery. Additionally, wouldn't want further interventions regardless. F/U as outpatient.  Secondary Diagnosis:	Hypothyroidism, unspecified type  Goal:	Continue Synthroid.  Assessment and plan of treatment:	TSH WNL - Take Synthroid  Secondary Diagnosis:	Hydronephrosis  Goal:	Follow up with urology Dr Shine.  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 You were seen by urology and are s/p straight cath protocol x3. A hensley was placed on 1/23, which was later pulled by the patient on 2/2. Reinserted. You will need to continue this hensley catheter and follow up with urology as an outpatient.

## 2019-01-29 NOTE — DISCHARGE NOTE ADULT - HOSPITAL COURSE
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      1. Acute diastolic heart failure - Pulmonary edema & effusions per CT scan, which are likely related to HF (has history of diastolic HF per our last TTE). Echo results - EF 67%  1. Mitral annular calcification, otherwise normal mitral valve. Mild mitral regurgitation. 2. Aortic valve leaflet morphology not well visualized. The valve is calcified. Peak transaortic valve gradient equals 58 mm Hg, mean transaortic valve gradient equals 35 mm Hg, estimated aortic valve area equals 1.2 sqcm (by continuity equation), consistent with moderate aortic stenosis. Mild aortic regurgitation. 3. Normal left ventricular internal dimensions and wall thicknesses.  4. Endocardium not well visualized; grossly normal left ventricular systolic function. 5. The right ventricle is not well visualized; grossly normal right ventricular systolic function.  A device wire is noted in the right heart.    Patient was treated with Lasix 20mg daily.   2. Hydronephrosis - CT abdomen with findings of mild bilateral hydroureteronephrosis to the level of the bladder. Patient noted retention on bladder scans. She is s/p straight cath protocol x3, however, retaining and so hensley placed on 1/23. Evaluated by urology as inpatient for retention who recommended continuing Hensley catheter and outpatient follow up at [facility] on 2/4/19.   3. Hypokalemia - resolved, renal function at baseline.   4. Proctitis - Possibly from stool burden in rectum. On bowel regimen without further reports of diarrhea. Abdominal xray showed non obstructive bowel pattern.   5. Coronavirus infection - supportive care.   6. Kidney tumor - Seen on CT abdomen as well as previous imaging, though now enlarging.  Per previous provider documentation, this was discussed with son who stated it was being monitored as OP for several years and that at given age he was told cardiac issues preclude her from surgery and she had stated she wouldn't want further intervention regardless. Monitor and outpatient follow up.   7. Hypothyroidism - TSH wnl, to continue with levothyroxine.   8. Severe protein-calorie malnutrition - encourage PO.   9. Deep vein thrombosis (DVT) - IVC filter in past.     Dispo: REHAB   Patient seen and evaluated, no acute somatic complaints. Medically cleared/stable for discharge as per   with close outpatient follow up within 1-2 weeks of discharge. Patient understands and agrees with plan of care. 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      1. Acute diastolic heart failure - Pulmonary edema & effusions per CT scan, which are likely related to HF (has history of diastolic HF per our last TTE). Echo results - EF 67%  1. Mitral annular calcification, otherwise normal mitral valve. Mild mitral regurgitation. 2. Aortic valve leaflet morphology not well visualized. The valve is calcified. Peak transaortic valve gradient equals 58 mm Hg, mean transaortic valve gradient equals 35 mm Hg, estimated aortic valve area equals 1.2 sqcm (by continuity equation), consistent with moderate aortic stenosis. Mild aortic regurgitation. 3. Normal left ventricular internal dimensions and wall thicknesses.  4. Endocardium not well visualized; grossly normal left ventricular systolic function. 5. The right ventricle is not well visualized; grossly normal right ventricular systolic function.  A device wire is noted in the right heart.    Patient was treated with Lasix 20mg daily.   2. Hydronephrosis - CT abdomen with findings of mild bilateral hydroureteronephrosis to the level of the bladder. Patient noted retention on bladder scans. She is s/p straight cath protocol x3, however, retaining and so hensley placed on 1/23. Later pulled by patient 2/2 and re-inserted. Has outpatient follow up at [facility] on 2/4/19.   3. Hypokalemia - resolved, renal function at baseline.   4. Proctitis - Possibly from stool burden in rectum. On bowel regimen without further reports of diarrhea. Abdominal xray showed non obstructive bowel pattern.   5. Coronavirus infection - supportive care.   6. Kidney tumor - Seen on CT abdomen as well as previous imaging, though now enlarging.  Per previous provider documentation, this was discussed with son who stated it was being monitored as OP for several years and that at given age he was told cardiac issues preclude her from surgery and she had stated she wouldn't want further intervention regardless. Monitor and outpatient follow up.   7. Hypothyroidism - TSH wnl, to continue with levothyroxine.   8. Severe protein-calorie malnutrition - encourage PO.   9. Deep vein thrombosis (DVT) - IVC filter in past.     Dispo: REHAB to [facility]     Patient seen and evaluated, no acute somatic complaints. Medically cleared/stable for discharge as per Dr. Mckeon with close outpatient follow up within 1-2 weeks of discharge. Patient understands and agrees with plan of care. 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      1. Acute diastolic heart failure Patient was treated with Lasix 20mg daily.   2. Hydronephrosis - CT abdomen with findings of mild bilateral hydroureteronephrosis to the level of the bladder. Patient noted retention on bladder scans. She is s/p straight cath protocol x3, however, retaining and so hensley placed on 1/23. Later pulled by patient 2/2 and re-inserted. Has outpatient follow up at [facility]    3. Hypokalemia - resolved, renal function at baseline.   4. Proctitis - Possibly from stool burden in rectum. On bowel regimen without further reports of diarrhea. Abdominal xray showed non obstructive bowel pattern.   5. Coronavirus infection - supportive care.   6. Kidney tumor - Seen on CT abdomen as well as previous imaging, though now enlarging.  Per previous provider documentation, this was discussed with son who stated it was being monitored as OP for several years and that at given age he was told cardiac issues preclude her from surgery and she had stated she wouldn't want further intervention regardless. Monitor and outpatient follow up.   7. Hypothyroidism - TSH wnl, to continue with levothyroxine.   8. Severe protein-calorie malnutrition - encourage PO.   9. Deep vein thrombosis (DVT) - IVC filter in past.     Chronic poor PO intake c/w dementia, not a candidate for PEG due to behaviors and high risk for pulling out the PEG so risk outweighs benefit    Intermittent loose stool c/w intermittent PO intake, mostly ensure    Dispo: REHAB to [email or web address]     Patient seen and evaluated, no acute somatic complaints. Medically cleared/stable for discharge as per Dr. Mckeon with close outpatient follow up within 1-2 weeks of discharge. Patient understands and agrees with plan of care.

## 2019-01-29 NOTE — DISCHARGE NOTE ADULT - ADDITIONAL INSTRUCTIONS
Follow up with PCP within 1-2 weeks of discharge.  Follow up with urology within 1-2 weeks of discharge.

## 2019-01-29 NOTE — DISCHARGE NOTE ADULT - MEDICATION SUMMARY - MEDICATIONS TO CHANGE
I will SWITCH the dose or number of times a day I take the medications listed below when I get home from the hospital:    furosemide 20 mg oral tablet  -- 1 tab(s) by mouth 3 times a week on  T/Th/Sun    SEROquel 25 mg oral tablet  -- 1 tab(s) by mouth once a day (may repeat an additional tablet PRN)

## 2019-01-29 NOTE — DISCHARGE NOTE ADULT - COMMUNITY RESOURCES
[facility] [address] [place] [address] DHIRAJ Mark [id number] [phone]. [facility] [phone]

## 2019-01-29 NOTE — DISCHARGE NOTE ADULT - PATIENT PORTAL LINK FT
You can access the [facility] Patient Portal, offered by [facility], by registering with the following website: [email or web address]

## 2019-01-29 NOTE — DISCHARGE NOTE ADULT - PLAN OF CARE
To relieve and prevent worsening symptoms associated with congestive heart failure, to improve quality of life, and to treat underlying conditions such as coronary heart disease, high blood pressure, or diabetes, and to maintain euvolemia. Euvolemic on examination. Found to have pulmonary edema & effusions per chest CT read, likely suspected/related to heart failure. Underwent transthoracic echo - showing moderate aortic stenosis. Mild aortic regurgitation. Grossly normal left ventricular systolic function. Patient to remain on Lasix 20 mg daily.  Low salt diet, fluid restriction to 1500 ml daily, monitor your fluid intake and weight daily, exercise as tolerated 30 minutes daily, and follow up with your physician within 7 days. s/p IVC filter. Resolved Resolved. Monitor, follow up as outpatient. A kidney tumor was seen on CT abdomen during this admission, though also seen on previous imaging, now enlarging.  This was discussed with patient's son who stated it was being monitored as outpatient for several years and that given age and cardiac issues, this would preclude her from surgery. Additionally, wouldn't want further interventions regardless. F/U as outpatient. Continue Synthroid. TSH WNL - Take Synthroid Follow up with urology Dr Shine. CT abdomen with findings of mild bilateral hydroureteronephrosis to the level of the bladder, here voiding however on US still with residuals suspect long-standing retention, as similar retention issues in august. You were seen by urology and are s/p straight cath protocol x3. A hensley was placed on 1/23, which was later pulled by the patient on 2/2. Reinserted. You will need to continue this hensley catheter and follow up with urology as an outpatient.

## 2019-01-30 ENCOUNTER — APPOINTMENT (OUTPATIENT)
Dept: CARDIOLOGY | Facility: CLINIC | Age: 84
End: 2019-01-30

## 2019-01-30 PROCEDURE — 99233 SBSQ HOSP IP/OBS HIGH 50: CPT

## 2019-01-30 RX ADMIN — QUETIAPINE FUMARATE 25 MILLIGRAM(S): 200 TABLET, FILM COATED ORAL at 17:38

## 2019-01-30 RX ADMIN — HEPARIN SODIUM 5000 UNIT(S): 5000 INJECTION INTRAVENOUS; SUBCUTANEOUS at 17:38

## 2019-01-30 RX ADMIN — SODIUM CHLORIDE 3 MILLILITER(S): 9 INJECTION INTRAMUSCULAR; INTRAVENOUS; SUBCUTANEOUS at 21:29

## 2019-01-30 RX ADMIN — ATORVASTATIN CALCIUM 10 MILLIGRAM(S): 80 TABLET, FILM COATED ORAL at 21:30

## 2019-01-30 RX ADMIN — SODIUM CHLORIDE 3 MILLILITER(S): 9 INJECTION INTRAMUSCULAR; INTRAVENOUS; SUBCUTANEOUS at 12:07

## 2019-01-30 NOTE — PROGRESS NOTE ADULT - SUBJECTIVE AND OBJECTIVE BOX
Patient is a 89y old  Female who presents with a chief complaint of weakness and diarrhea (26 Jan 2019 16:36)      SUBJECTIVE / OVERNIGHT EVENTS: Pt seen and examined by me Aide at bedside      MEDICATIONS  (STANDING):  ALBUTerol/ipratropium for Nebulization 3 milliLiter(s) Nebulizer every 8 hours  amiodarone    Tablet 100 milliGRAM(s) Oral daily  aspirin  chewable 81 milliGRAM(s) Oral daily  atorvastatin 10 milliGRAM(s) Oral at bedtime  docusate sodium 300 milliGRAM(s) Oral daily  escitalopram 5 milliGRAM(s) Oral daily  furosemide    Tablet 20 milliGRAM(s) Oral daily  heparin  Injectable 5000 Unit(s) SubCutaneous every 12 hours  levothyroxine 100 MICROGram(s) Oral daily  multivitamin 1 Tablet(s) Oral daily  QUEtiapine 25 milliGRAM(s) Oral <User Schedule>  senna 2 Tablet(s) Oral at bedtime  sodium chloride 0.9% lock flush 3 milliLiter(s) IV Push every 8 hours  sodium chloride 0.9%. 1000 milliLiter(s) (50 mL/Hr) IV Continuous <Continuous>  spironolactone 12.5 milliGRAM(s) Oral <User Schedule>    MEDICATIONS  (PRN):  acetaminophen   Tablet .. 650 milliGRAM(s) Oral every 6 hours PRN Mild Pain (1 - 3), Moderate Pain (4 - 6), Severe Pain (7 - 10)  benzonatate 100 milliGRAM(s) Oral every 8 hours PRN Cough  bisacodyl Suppository 10 milliGRAM(s) Rectal daily PRN Constipation  polyethylene glycol 3350 17 Gram(s) Oral daily PRN Constipation    Vital Signs Last 24 Hrs  T(C): 36.7 (30 Jan 2019 05:26), Max: 36.8 (29 Jan 2019 21:30)  T(F): 98 (30 Jan 2019 05:26), Max: 98.2 (29 Jan 2019 21:30)  HR: 64 (30 Jan 2019 05:26) (61 - 68)  BP: 123/76 (30 Jan 2019 05:26) (103/63 - 127/71)  BP(mean): --  RR: 17 (30 Jan 2019 05:26) (17 - 18)  SpO2: 96% (30 Jan 2019 05:26) (96% - 98%)    I&O's Summary    26 Jan 2019 07:01  -  27 Jan 2019 07:00  --------------------------------------------------------  IN: 1125 mL / OUT: 1000 mL / NET: 125 mL        PHYSICAL EXAM:  GENERAL: NAD, [place]  NECK: Supple, No JVD  CHEST/LUNG: Clear to auscultation bilaterally; No wheeze  HEART: Regular rate and rhythm; No murmurs, rubs, or gallops  ABDOMEN: Soft, Nontender, Nondistended; Bowel sounds present  : Bobby in place  EXTREMITIES:  2+ Peripheral Pulses, No clubbing, cyanosis, or edema  NEUROLOGY: AAOx1-2, non-focal  PSYCH: calm  SKIN: No rashes or lesions    LABS:                                             8.7    10.36 )-----------( 431      ( 29 Jan 2019 07:00 )             29.1     01-29    140  |  105  |  18  ----------------------------<  109<H>  3.2<L>   |  23  |  0.71    Ca    8.1<L>      29 Jan 2019 07:00 Patient is a 89y old  Female who presents with a chief complaint of weakness and diarrhea (26 Jan 2019 16:36)      SUBJECTIVE / OVERNIGHT EVENTS: Pt seen and examined by me Aide at bedside  Pt had 3 loose BM this AM  As per aide- pt not eating as much, drinking liquids  Pt states she does not like the food here   Son to bring in home food       MEDICATIONS  (STANDING):  ALBUTerol/ipratropium for Nebulization 3 milliLiter(s) Nebulizer every 8 hours  amiodarone    Tablet 100 milliGRAM(s) Oral daily  aspirin  chewable 81 milliGRAM(s) Oral daily  atorvastatin 10 milliGRAM(s) Oral at bedtime  docusate sodium 300 milliGRAM(s) Oral daily  escitalopram 5 milliGRAM(s) Oral daily  furosemide    Tablet 20 milliGRAM(s) Oral daily  heparin  Injectable 5000 Unit(s) SubCutaneous every 12 hours  levothyroxine 100 MICROGram(s) Oral daily  multivitamin 1 Tablet(s) Oral daily  QUEtiapine 25 milliGRAM(s) Oral <User Schedule>  senna 2 Tablet(s) Oral at bedtime  sodium chloride 0.9% lock flush 3 milliLiter(s) IV Push every 8 hours  sodium chloride 0.9%. 1000 milliLiter(s) (50 mL/Hr) IV Continuous <Continuous>  spironolactone 12.5 milliGRAM(s) Oral <User Schedule>    MEDICATIONS  (PRN):  acetaminophen   Tablet .. 650 milliGRAM(s) Oral every 6 hours PRN Mild Pain (1 - 3), Moderate Pain (4 - 6), Severe Pain (7 - 10)  benzonatate 100 milliGRAM(s) Oral every 8 hours PRN Cough  bisacodyl Suppository 10 milliGRAM(s) Rectal daily PRN Constipation  polyethylene glycol 3350 17 Gram(s) Oral daily PRN Constipation    Vital Signs Last 24 Hrs  T(C): 36.7 (30 Jan 2019 05:26), Max: 36.8 (29 Jan 2019 21:30)  T(F): 98 (30 Jan 2019 05:26), Max: 98.2 (29 Jan 2019 21:30)  HR: 64 (30 Jan 2019 05:26) (61 - 68)  BP: 123/76 (30 Jan 2019 05:26) (103/63 - 127/71)  BP(mean): --  RR: 17 (30 Jan 2019 05:26) (17 - 18)  SpO2: 96% (30 Jan 2019 05:26) (96% - 98%)    I&O's Summary    26 Jan 2019 07:01  -  27 Jan 2019 07:00  --------------------------------------------------------  IN: 1125 mL / OUT: 1000 mL / NET: 125 mL        PHYSICAL EXAM:  GENERAL: NAD, [place]  NECK: Supple, No JVD  CHEST/LUNG: Clear to auscultation bilaterally; No wheeze  HEART: Regular rate and rhythm; No murmurs, rubs, or gallops  ABDOMEN: Soft, Nontender, Nondistended; Bowel sounds present  : Bobby in place  EXTREMITIES:  2+ Peripheral Pulses, No clubbing, cyanosis, or edema  NEUROLOGY: AAOx1-2, non-focal  PSYCH: calm  SKIN: No rashes or lesions    LABS:                                             8.7    10.36 )-----------( 431      ( 29 Jan 2019 07:00 )             29.1     01-29    140  |  105  |  18  ----------------------------<  109<H>  3.2<L>   |  23  |  0.71    Ca    8.1<L>      29 Jan 2019 07:00

## 2019-01-30 NOTE — PROGRESS NOTE ADULT - PROBLEM SELECTOR PLAN 1
Resolved, pt appears euvolemic   Pulmonary edema & effusions per CT read suspect related to HF, has history of diastolic HF per our last TTE   -TTE reviewed    -c/w lasix 20 mg PO daily   -Strict I&Os, daily weights

## 2019-01-30 NOTE — PROGRESS NOTE ADULT - PROBLEM SELECTOR PLAN 10
SQH  DNR/DNI ([facility])  OOB to chair; PT rec now [facility]  dispo planning to [facility], but insurance denied Rehab  FU SW

## 2019-01-31 DIAGNOSIS — R19.7 DIARRHEA, UNSPECIFIED: ICD-10-CM

## 2019-01-31 PROCEDURE — 99232 SBSQ HOSP IP/OBS MODERATE 35: CPT

## 2019-01-31 RX ADMIN — Medication 81 MILLIGRAM(S): at 17:27

## 2019-01-31 RX ADMIN — Medication 20 MILLIGRAM(S): at 06:30

## 2019-01-31 RX ADMIN — AMIODARONE HYDROCHLORIDE 100 MILLIGRAM(S): 400 TABLET ORAL at 06:29

## 2019-01-31 RX ADMIN — SODIUM CHLORIDE 3 MILLILITER(S): 9 INJECTION INTRAMUSCULAR; INTRAVENOUS; SUBCUTANEOUS at 13:38

## 2019-01-31 RX ADMIN — HEPARIN SODIUM 5000 UNIT(S): 5000 INJECTION INTRAVENOUS; SUBCUTANEOUS at 06:31

## 2019-01-31 RX ADMIN — Medication 3 MILLILITER(S): at 14:55

## 2019-01-31 RX ADMIN — Medication 100 MICROGRAM(S): at 06:31

## 2019-01-31 RX ADMIN — Medication 3 MILLILITER(S): at 07:47

## 2019-01-31 RX ADMIN — SENNA PLUS 2 TABLET(S): 8.6 TABLET ORAL at 00:18

## 2019-01-31 RX ADMIN — QUETIAPINE FUMARATE 25 MILLIGRAM(S): 200 TABLET, FILM COATED ORAL at 17:08

## 2019-01-31 RX ADMIN — SODIUM CHLORIDE 3 MILLILITER(S): 9 INJECTION INTRAMUSCULAR; INTRAVENOUS; SUBCUTANEOUS at 21:28

## 2019-01-31 RX ADMIN — ATORVASTATIN CALCIUM 10 MILLIGRAM(S): 80 TABLET, FILM COATED ORAL at 21:16

## 2019-01-31 RX ADMIN — SODIUM CHLORIDE 3 MILLILITER(S): 9 INJECTION INTRAMUSCULAR; INTRAVENOUS; SUBCUTANEOUS at 06:02

## 2019-01-31 NOTE — PROGRESS NOTE ADULT - PROBLEM SELECTOR PLAN 3
resolved, K today hemolyzed, renal fxn at baseline Resolved, pt appears euvolemic   Pulmonary edema & effusions per CT read suspect related to HF, has history of diastolic HF per our last TTE   -TTE reviewed -moderate aortic stenosis. Mild aortic regurgitation. Grossly  normal left ventricular systolic function  -c/w lasix 20 mg PO daily   -Strict I&Os, daily weights

## 2019-01-31 NOTE — PROGRESS NOTE ADULT - SUBJECTIVE AND OBJECTIVE BOX
Patient is a 89y old  Female who presents with a chief complaint of weakness and diarrhea (26 Jan 2019 16:36)      SUBJECTIVE / OVERNIGHT EVENTS: Pt seen and examined by me Aide at bedside        MEDICATIONS  (STANDING):  ALBUTerol/ipratropium for Nebulization 3 milliLiter(s) Nebulizer every 8 hours  amiodarone    Tablet 100 milliGRAM(s) Oral daily  aspirin  chewable 81 milliGRAM(s) Oral daily  atorvastatin 10 milliGRAM(s) Oral at bedtime  docusate sodium 300 milliGRAM(s) Oral daily  escitalopram 5 milliGRAM(s) Oral daily  furosemide    Tablet 20 milliGRAM(s) Oral daily  heparin  Injectable 5000 Unit(s) SubCutaneous every 12 hours  levothyroxine 100 MICROGram(s) Oral daily  multivitamin 1 Tablet(s) Oral daily  QUEtiapine 25 milliGRAM(s) Oral <User Schedule>  senna 2 Tablet(s) Oral at bedtime  sodium chloride 0.9% lock flush 3 milliLiter(s) IV Push every 8 hours  sodium chloride 0.9%. 1000 milliLiter(s) (50 mL/Hr) IV Continuous <Continuous>  spironolactone 12.5 milliGRAM(s) Oral <User Schedule>    MEDICATIONS  (PRN):  acetaminophen   Tablet .. 650 milliGRAM(s) Oral every 6 hours PRN Mild Pain (1 - 3), Moderate Pain (4 - 6), Severe Pain (7 - 10)  benzonatate 100 milliGRAM(s) Oral every 8 hours PRN Cough  bisacodyl Suppository 10 milliGRAM(s) Rectal daily PRN Constipation  polyethylene glycol 3350 17 Gram(s) Oral daily PRN Constipation      Vital Signs Last 24 Hrs  T(C): 36.4 (31 Jan 2019 06:26), Max: 36.6 (30 Jan 2019 13:19)  T(F): 97.5 (31 Jan 2019 06:26), Max: 97.8 (30 Jan 2019 13:19)  HR: 62 (31 Jan 2019 07:47) (60 - 69)  BP: 115/50 (31 Jan 2019 06:26) (115/50 - 121/60)  BP(mean): --  RR: 18 (31 Jan 2019 06:26) (18 - 18)  SpO2: 98% (31 Jan 2019 07:47) (96% - 99%)          PHYSICAL EXAM:  GENERAL: NAD, [place]  NECK: Supple, No JVD  CHEST/LUNG: Clear to auscultation bilaterally; No wheeze  HEART: Regular rate and rhythm; No murmurs, rubs, or gallops  ABDOMEN: Soft, Nontender, Nondistended; Bowel sounds present  : Bobby in place  EXTREMITIES:  2+ Peripheral Pulses, No clubbing, cyanosis, or edema  NEUROLOGY: AAOx1-2, non-focal  PSYCH: calm  SKIN: No rashes or lesions    LABS: Patient is a 89y old  Female who presents with a chief complaint of weakness and diarrhea (26 Jan 2019 16:36)      SUBJECTIVE / OVERNIGHT EVENTS: Pt seen and examined by michele Brown at bedside  DW Staff- pt with 4 episodes of Diarrhea overnight and 3 during the day  Pt had another episode of loose watery stool during my exam   pt was initially admitted with diarrhea and then developed constipation   As per pts son- pt not eating much and is very concerned about her PO intake         MEDICATIONS  (STANDING):  ALBUTerol/ipratropium for Nebulization 3 milliLiter(s) Nebulizer every 8 hours  amiodarone    Tablet 100 milliGRAM(s) Oral daily  aspirin  chewable 81 milliGRAM(s) Oral daily  atorvastatin 10 milliGRAM(s) Oral at bedtime  docusate sodium 300 milliGRAM(s) Oral daily  escitalopram 5 milliGRAM(s) Oral daily  furosemide    Tablet 20 milliGRAM(s) Oral daily  heparin  Injectable 5000 Unit(s) SubCutaneous every 12 hours  levothyroxine 100 MICROGram(s) Oral daily  multivitamin 1 Tablet(s) Oral daily  QUEtiapine 25 milliGRAM(s) Oral <User Schedule>  senna 2 Tablet(s) Oral at bedtime  sodium chloride 0.9% lock flush 3 milliLiter(s) IV Push every 8 hours  sodium chloride 0.9%. 1000 milliLiter(s) (50 mL/Hr) IV Continuous <Continuous>  spironolactone 12.5 milliGRAM(s) Oral <User Schedule>    MEDICATIONS  (PRN):  acetaminophen   Tablet .. 650 milliGRAM(s) Oral every 6 hours PRN Mild Pain (1 - 3), Moderate Pain (4 - 6), Severe Pain (7 - 10)  benzonatate 100 milliGRAM(s) Oral every 8 hours PRN Cough  bisacodyl Suppository 10 milliGRAM(s) Rectal daily PRN Constipation  polyethylene glycol 3350 17 Gram(s) Oral daily PRN Constipation      Vital Signs Last 24 Hrs  T(C): 36.4 (31 Jan 2019 06:26), Max: 36.6 (30 Jan 2019 13:19)  T(F): 97.5 (31 Jan 2019 06:26), Max: 97.8 (30 Jan 2019 13:19)  HR: 62 (31 Jan 2019 07:47) (60 - 69)  BP: 115/50 (31 Jan 2019 06:26) (115/50 - 121/60)  BP(mean): --  RR: 18 (31 Jan 2019 06:26) (18 - 18)  SpO2: 98% (31 Jan 2019 07:47) (96% - 99%)          PHYSICAL EXAM:  GENERAL: NAD, [place]  NECK: Supple, No JVD  CHEST/LUNG: Clear to auscultation bilaterally; No wheeze  HEART: Regular rate and rhythm; No murmurs, rubs, or gallops  ABDOMEN: Soft, Nontender, Nondistended; Bowel sounds present  : Bobby in place  EXTREMITIES:  2+ Peripheral Pulses, No clubbing, cyanosis, or edema  NEUROLOGY: AAOx1-2, non-focal  PSYCH: calm  SKIN: No rashes or lesions    LABS:

## 2019-01-31 NOTE — PROGRESS NOTE ADULT - PROBLEM SELECTOR PLAN 10
SQH  DNR/DNI ([facility])  OOB to chair; PT rec now [facility]  dispo planning to [facility], but insurance denied Rehab  FU SW IVC filter in past   - c/w ppx

## 2019-01-31 NOTE — PROGRESS NOTE ADULT - PROBLEM SELECTOR PLAN 1
Resolved, pt appears euvolemic   Pulmonary edema & effusions per CT read suspect related to HF, has history of diastolic HF per our last TTE   -TTE reviewed -moderate aortic stenosis. Mild aortic regurgitation. Grossly  normal left ventricular systolic function  -c/w lasix 20 mg PO daily   -Strict I&Os, daily weights Pt with 7- 8 episodes of diarrhea in 24 hours  pt was started on laxatives previously as she was constipated   Will hold laxative and monitor stool count

## 2019-01-31 NOTE — PROGRESS NOTE ADULT - PROBLEM SELECTOR PLAN 7
TSH wnl  - continue with levothyroxine Seen on CT abdomen.  Seen in previous imaging.  Now enlarging.  Per previous provider documentation, this was discussed with son who stated it was being monitored as OP for several years and that given age he was told cardiac issues preclude her from surgery and she had stated she wouldn't want further intervention regardless  - monitor

## 2019-01-31 NOTE — PROGRESS NOTE ADULT - PROBLEM SELECTOR PLAN 6
Seen on CT abdomen.  Seen in previous imaging.  Now enlarging.  Per previous provider documentation, this was discussed with son who stated it was being monitored as OP for several years and that given age he was told cardiac issues preclude her from surgery and she had stated she wouldn't want further intervention regardless  - monitor -c/w supportive care

## 2019-01-31 NOTE — PROGRESS NOTE ADULT - PROBLEM SELECTOR PLAN 4
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  In August has similar retention issues, thought 2/2 pain, seen by urology.  -s/p straight cath protocol x3, retained, hensley placed on 1/23 with 600cc of UOP  -Evaluated by Urology as inpatient for retention; recommend continuing Hensley catheter  -will need OP  f/u--arranged for 2/4 at [facility]

## 2019-01-31 NOTE — PROGRESS NOTE ADULT - PROBLEM SELECTOR PLAN 9
IVC filter in past   - c/w ppx - appreciate nutrition eval 1/22  - starting to take better PO  - Encourage PO  Calorie count ordered

## 2019-01-31 NOTE — PROGRESS NOTE ADULT - PROBLEM SELECTOR PLAN 8
- appreciate nutrition eval 1/22  - starting to take better PO  - Encourage PO TSH wnl  - continue with levothyroxine

## 2019-01-31 NOTE — PROGRESS NOTE ADULT - PROBLEM SELECTOR PLAN 2
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  In August has similar retention issues, thought 2/2 pain, seen by urology.  -s/p straight cath protocol x3, retained, hensley placed on 1/23 with 600cc of UOP  -Evaluated by Urology as inpatient for retention; recommend continuing Hensley catheter  -will need OP  f/u--arranged for 2/4 at [facility] Admission CT abdomen from 1/20 shows nonspecific rectal wall thickening with   surrounding stranding, suggestive of proctitis   Possibly from stool burden in rectum   Abdominal xray- non obstructive bowel pattern  pt alternating between Diarrhea and constipation

## 2019-02-01 DIAGNOSIS — Z02.9 ENCOUNTER FOR ADMINISTRATIVE EXAMINATIONS, UNSPECIFIED: ICD-10-CM

## 2019-02-01 LAB — C DIFF TOX GENS STL QL NAA+PROBE: SIGNIFICANT CHANGE UP

## 2019-02-01 PROCEDURE — 99232 SBSQ HOSP IP/OBS MODERATE 35: CPT

## 2019-02-01 RX ORDER — POTASSIUM CHLORIDE 20 MEQ
40 PACKET (EA) ORAL EVERY 4 HOURS
Qty: 0 | Refills: 0 | Status: DISCONTINUED | OUTPATIENT
Start: 2019-02-01 | End: 2019-02-01

## 2019-02-01 RX ADMIN — SODIUM CHLORIDE 3 MILLILITER(S): 9 INJECTION INTRAMUSCULAR; INTRAVENOUS; SUBCUTANEOUS at 21:50

## 2019-02-01 RX ADMIN — QUETIAPINE FUMARATE 25 MILLIGRAM(S): 200 TABLET, FILM COATED ORAL at 21:52

## 2019-02-01 RX ADMIN — Medication 100 MICROGRAM(S): at 05:49

## 2019-02-01 RX ADMIN — Medication 81 MILLIGRAM(S): at 17:18

## 2019-02-01 RX ADMIN — Medication 20 MILLIGRAM(S): at 05:48

## 2019-02-01 RX ADMIN — SODIUM CHLORIDE 3 MILLILITER(S): 9 INJECTION INTRAMUSCULAR; INTRAVENOUS; SUBCUTANEOUS at 13:07

## 2019-02-01 RX ADMIN — AMIODARONE HYDROCHLORIDE 100 MILLIGRAM(S): 400 TABLET ORAL at 05:48

## 2019-02-01 RX ADMIN — ATORVASTATIN CALCIUM 10 MILLIGRAM(S): 80 TABLET, FILM COATED ORAL at 21:52

## 2019-02-01 RX ADMIN — HEPARIN SODIUM 5000 UNIT(S): 5000 INJECTION INTRAVENOUS; SUBCUTANEOUS at 05:48

## 2019-02-01 RX ADMIN — HEPARIN SODIUM 5000 UNIT(S): 5000 INJECTION INTRAVENOUS; SUBCUTANEOUS at 17:20

## 2019-02-01 RX ADMIN — Medication 3 MILLILITER(S): at 07:39

## 2019-02-01 RX ADMIN — SODIUM CHLORIDE 3 MILLILITER(S): 9 INJECTION INTRAMUSCULAR; INTRAVENOUS; SUBCUTANEOUS at 05:53

## 2019-02-01 RX ADMIN — Medication 3 MILLILITER(S): at 15:26

## 2019-02-01 NOTE — PROGRESS NOTE ADULT - PROBLEM SELECTOR PLAN 1
Pt with 7- 8 episodes of diarrhea in 24 hours  pt was started on laxatives previously as she was constipated   Will hold laxative and monitor stool count Pt with multiple episodes  of diarrhea x past 2 days   pt was started on laxatives previously as she was constipated   Laxatives held   monitor stool count

## 2019-02-01 NOTE — PROGRESS NOTE ADULT - PROBLEM SELECTOR PLAN 4
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  In August has similar retention issues, thought 2/2 pain, seen by urology.  -s/p straight cath protocol x3, retained, hensley placed on 1/23 with 600cc of UOP  -Evaluated by Urology as inpatient for retention; recommend continuing Hensley catheter  -will need OP  f/u--arranged for 2/4 at [facility] Resolved, pt appears euvolemic   Pulmonary edema & effusions per CT read suspect related to HF, has history of diastolic HF per our last TTE   -TTE reviewed -moderate aortic stenosis. Mild aortic regurgitation. Grossly  normal left ventricular systolic function  -c/w lasix 20 mg PO daily   -Strict I&Os, daily weights

## 2019-02-01 NOTE — SWALLOW BEDSIDE ASSESSMENT ADULT - COMMENTS
MD orders received. Chart reviewed. 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 admitted for pulm edema/pleural effusion on CT. Further chart review further revealed previous MBS completed on 1/2017 with evidence of penetration of thin liquids.    Attempted to see pt for initial assessment of swallow function this PM at which time pt's HHA present at bedside. Began discussing bedside evaluation with pt and HHA at which time pt became increasingly agitated and combative shouting "go away" and pushing away clinician. Despite maximum verbal encouragement provided by SLP and HHA, pt continued to demonstrate increased agitation and combativeness, refusing all proposed PO trials.    Recommendations:  1) Will defer PO diet to MD at this time given pt refusal  2) Suggest RD consultation to ensure pt is meeting adequate caloric needs as she is at an increased nutritional risk, with consideration for ST alternative means of nutrition if pt is unable to meet daily caloric needs via PO.

## 2019-02-01 NOTE — PROGRESS NOTE ADULT - PROBLEM SELECTOR PLAN 2
Admission CT abdomen from 1/20 shows nonspecific rectal wall thickening with   surrounding stranding, suggestive of proctitis   Possibly from stool burden in rectum   Abdominal xray- non obstructive bowel pattern  pt alternating between Diarrhea and constipation appreciate nutrition eval 1/22  Encourage PO  Calorie count ordered

## 2019-02-01 NOTE — PROGRESS NOTE ADULT - PROBLEM SELECTOR PLAN 9
- appreciate nutrition eval 1/22  - starting to take better PO  - Encourage PO  Calorie count ordered IVC filter in past    c/w ppx

## 2019-02-01 NOTE — PROGRESS NOTE ADULT - PROBLEM SELECTOR PLAN 10
IVC filter in past   - c/w ppx DW Son on 1/31- Son very concerned about pts Discussed with son - Son concerned that pt not eating. Advised son that we will  FU Nutrition consult  pending calorie count. Son reports pt may have difficult swallowing. Aide reports coughing on swallowing. Reviewed Nutritionist/Dietitian  note from 1/21- Recommend SS eval. Will obtain SS  Son also requesting Ana lift. MALKA CM  Discharge also pending resolution of diarrhea  DC Planning to [facility]

## 2019-02-01 NOTE — PROGRESS NOTE ADULT - ATTENDING COMMENTS
Discussed with son - Son concerned that pt not eating. Advised son that we will obtain calorie count and advise accordingly  Will obtain Nutrition consult Discussed with son - Son concerned that pt not eating. Advised son that we will obtain calorie count and advise accordingly  Son reports pt may have difficult swallowing   Reviewed Nutritionist/Dietian note from 1/21- Recommend SS eval   Will obtain SS

## 2019-02-01 NOTE — PROGRESS NOTE ADULT - SUBJECTIVE AND OBJECTIVE BOX
Patient is a 89y old  Female who presents with a chief complaint of weakness and diarrhea (26 Jan 2019 16:36)      SUBJECTIVE / OVERNIGHT EVENTS: Pt seen and examined by me Aide at bedside    MEDICATIONS  (STANDING):  ALBUTerol/ipratropium for Nebulization 3 milliLiter(s) Nebulizer every 8 hours  amiodarone    Tablet 100 milliGRAM(s) Oral daily  aspirin  chewable 81 milliGRAM(s) Oral daily  atorvastatin 10 milliGRAM(s) Oral at bedtime  escitalopram 5 milliGRAM(s) Oral daily  furosemide    Tablet 20 milliGRAM(s) Oral daily  heparin  Injectable 5000 Unit(s) SubCutaneous every 12 hours  levothyroxine 100 MICROGram(s) Oral daily  multivitamin 1 Tablet(s) Oral daily  QUEtiapine 25 milliGRAM(s) Oral <User Schedule>  sodium chloride 0.9% lock flush 3 milliLiter(s) IV Push every 8 hours  sodium chloride 0.9%. 1000 milliLiter(s) (50 mL/Hr) IV Continuous <Continuous>  spironolactone 12.5 milliGRAM(s) Oral <User Schedule>    MEDICATIONS  (PRN):  acetaminophen   Tablet .. 650 milliGRAM(s) Oral every 6 hours PRN Mild Pain (1 - 3), Moderate Pain (4 - 6), Severe Pain (7 - 10)  benzonatate 100 milliGRAM(s) Oral every 8 hours PRN Cough      Vital Signs Last 24 Hrs  T(C): 36.7 (01 Feb 2019 05:46), Max: 36.7 (01 Feb 2019 05:46)  T(F): 98.1 (01 Feb 2019 05:46), Max: 98.1 (01 Feb 2019 05:46)  HR: 60 (01 Feb 2019 05:46) (60 - 74)  BP: 123/64 (01 Feb 2019 05:46) (104/55 - 123/64)  BP(mean): --  RR: 18 (01 Feb 2019 05:46) (16 - 18)  SpO2: 97% (01 Feb 2019 05:46) (96% - 97%)      PHYSICAL EXAM:  GENERAL: NAD, [place]  NECK: Supple, No JVD  CHEST/LUNG: Clear to auscultation bilaterally; No wheeze  HEART: Regular rate and rhythm; No murmurs, rubs, or gallops  ABDOMEN: Soft, Nontender, Nondistended; Bowel sounds present  : Bobby in place  EXTREMITIES:  2+ Peripheral Pulses, No clubbing, cyanosis, or edema  NEUROLOGY: AAOx1-2, non-focal  PSYCH: calm  SKIN: No rashes or lesions    LABS: Patient is a 89y old  Female who presents with a chief complaint of weakness and diarrhea (26 Jan 2019 16:36)      SUBJECTIVE / OVERNIGHT EVENTS: Pt seen and examined by me Aide at bedside  As per aide- pt ate a few teaspoons of mashed potato yesterday   Did not eat breakfast this AM   Intermittent cough on eating   DW RN- pt with multiple episodes of diarrhea yesterday and overnight. One episode of Diarrhea this AM   Laxatives dced yesterday     MEDICATIONS  (STANDING):  ALBUTerol/ipratropium for Nebulization 3 milliLiter(s) Nebulizer every 8 hours  amiodarone    Tablet 100 milliGRAM(s) Oral daily  aspirin  chewable 81 milliGRAM(s) Oral daily  atorvastatin 10 milliGRAM(s) Oral at bedtime  escitalopram 5 milliGRAM(s) Oral daily  furosemide    Tablet 20 milliGRAM(s) Oral daily  heparin  Injectable 5000 Unit(s) SubCutaneous every 12 hours  levothyroxine 100 MICROGram(s) Oral daily  multivitamin 1 Tablet(s) Oral daily  QUEtiapine 25 milliGRAM(s) Oral <User Schedule>  sodium chloride 0.9% lock flush 3 milliLiter(s) IV Push every 8 hours  sodium chloride 0.9%. 1000 milliLiter(s) (50 mL/Hr) IV Continuous <Continuous>  spironolactone 12.5 milliGRAM(s) Oral <User Schedule>    MEDICATIONS  (PRN):  acetaminophen   Tablet .. 650 milliGRAM(s) Oral every 6 hours PRN Mild Pain (1 - 3), Moderate Pain (4 - 6), Severe Pain (7 - 10)  benzonatate 100 milliGRAM(s) Oral every 8 hours PRN Cough      Vital Signs Last 24 Hrs  T(C): 36.7 (01 Feb 2019 05:46), Max: 36.7 (01 Feb 2019 05:46)  T(F): 98.1 (01 Feb 2019 05:46), Max: 98.1 (01 Feb 2019 05:46)  HR: 60 (01 Feb 2019 05:46) (60 - 74)  BP: 123/64 (01 Feb 2019 05:46) (104/55 - 123/64)  BP(mean): --  RR: 18 (01 Feb 2019 05:46) (16 - 18)  SpO2: 97% (01 Feb 2019 05:46) (96% - 97%)      PHYSICAL EXAM:  GENERAL: NAD, [place]  NECK: Supple, No JVD  CHEST/LUNG: Clear to auscultation bilaterally; No wheeze  HEART: Regular rate and rhythm; No murmurs, rubs, or gallops  ABDOMEN: Soft, Nontender, Nondistended; Bowel sounds present  : Bobby in place  EXTREMITIES:  2+ Peripheral Pulses, No clubbing, cyanosis, or edema  NEUROLOGY: AAOx1-2, non-focal  PSYCH: calm  SKIN: No rashes or lesions    LABS:

## 2019-02-01 NOTE — PROGRESS NOTE ADULT - PROBLEM SELECTOR PLAN 3
Resolved, pt appears euvolemic   Pulmonary edema & effusions per CT read suspect related to HF, has history of diastolic HF per our last TTE   -TTE reviewed -moderate aortic stenosis. Mild aortic regurgitation. Grossly  normal left ventricular systolic function  -c/w lasix 20 mg PO daily   -Strict I&Os, daily weights Admission CT abdomen from 1/20 shows nonspecific rectal wall thickening with   surrounding stranding, suggestive of proctitis   Possibly from stool burden in rectum   Abdominal xray- non obstructive bowel pattern  pt alternating between Diarrhea and constipation

## 2019-02-01 NOTE — PROGRESS NOTE ADULT - PROBLEM SELECTOR PLAN 5
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  In August has similar retention issues, thought 2/2 pain, seen by urology.  -s/p straight cath protocol x3, retained, hensley placed on 1/23 with 600cc of UOP  -Evaluated by Urology as inpatient for retention; recommend continuing Hensley catheter  -will need OP  f/u--arranged for 2/4 at [facility]

## 2019-02-02 LAB
ANION GAP SERPL CALC-SCNC: 14 MMO/L — SIGNIFICANT CHANGE UP (ref 7–14)
BUN SERPL-MCNC: 23 MG/DL — SIGNIFICANT CHANGE UP (ref 7–23)
CALCIUM SERPL-MCNC: 8.7 MG/DL — SIGNIFICANT CHANGE UP (ref 8.4–10.5)
CHLORIDE SERPL-SCNC: 105 MMOL/L — SIGNIFICANT CHANGE UP (ref 98–107)
CO2 SERPL-SCNC: 21 MMOL/L — LOW (ref 22–31)
CREAT SERPL-MCNC: 0.76 MG/DL — SIGNIFICANT CHANGE UP (ref 0.5–1.3)
GLUCOSE SERPL-MCNC: 137 MG/DL — HIGH (ref 70–99)
HCT VFR BLD CALC: 29.7 % — LOW (ref 34.5–45)
HGB BLD-MCNC: 8.9 G/DL — LOW (ref 11.5–15.5)
MAGNESIUM SERPL-MCNC: 2.1 MG/DL — SIGNIFICANT CHANGE UP (ref 1.6–2.6)
MCHC RBC-ENTMCNC: 30 % — LOW (ref 32–36)
MCHC RBC-ENTMCNC: 30 PG — SIGNIFICANT CHANGE UP (ref 27–34)
MCV RBC AUTO: 100 FL — SIGNIFICANT CHANGE UP (ref 80–100)
NRBC # FLD: 0 K/UL — LOW (ref 25–125)
PLATELET # BLD AUTO: 407 K/UL — HIGH (ref 150–400)
PMV BLD: 10.2 FL — SIGNIFICANT CHANGE UP (ref 7–13)
POTASSIUM SERPL-MCNC: 4 MMOL/L — SIGNIFICANT CHANGE UP (ref 3.5–5.3)
POTASSIUM SERPL-SCNC: 4 MMOL/L — SIGNIFICANT CHANGE UP (ref 3.5–5.3)
RBC # BLD: 2.97 M/UL — LOW (ref 3.8–5.2)
RBC # FLD: 16.4 % — HIGH (ref 10.3–14.5)
SODIUM SERPL-SCNC: 140 MMOL/L — SIGNIFICANT CHANGE UP (ref 135–145)
WBC # BLD: 10.27 K/UL — SIGNIFICANT CHANGE UP (ref 3.8–10.5)
WBC # FLD AUTO: 10.27 K/UL — SIGNIFICANT CHANGE UP (ref 3.8–10.5)

## 2019-02-02 PROCEDURE — 99232 SBSQ HOSP IP/OBS MODERATE 35: CPT

## 2019-02-02 RX ADMIN — Medication 1 TABLET(S): at 12:24

## 2019-02-02 RX ADMIN — Medication 3 MILLILITER(S): at 07:21

## 2019-02-02 RX ADMIN — SODIUM CHLORIDE 3 MILLILITER(S): 9 INJECTION INTRAMUSCULAR; INTRAVENOUS; SUBCUTANEOUS at 12:05

## 2019-02-02 RX ADMIN — SPIRONOLACTONE 12.5 MILLIGRAM(S): 25 TABLET, FILM COATED ORAL at 12:08

## 2019-02-02 RX ADMIN — ESCITALOPRAM OXALATE 5 MILLIGRAM(S): 10 TABLET, FILM COATED ORAL at 12:25

## 2019-02-02 RX ADMIN — SODIUM CHLORIDE 3 MILLILITER(S): 9 INJECTION INTRAMUSCULAR; INTRAVENOUS; SUBCUTANEOUS at 06:03

## 2019-02-02 RX ADMIN — SODIUM CHLORIDE 3 MILLILITER(S): 9 INJECTION INTRAMUSCULAR; INTRAVENOUS; SUBCUTANEOUS at 21:25

## 2019-02-02 RX ADMIN — ATORVASTATIN CALCIUM 10 MILLIGRAM(S): 80 TABLET, FILM COATED ORAL at 21:27

## 2019-02-02 RX ADMIN — Medication 100 MICROGRAM(S): at 06:02

## 2019-02-02 RX ADMIN — Medication 3 MILLILITER(S): at 23:28

## 2019-02-02 RX ADMIN — HEPARIN SODIUM 5000 UNIT(S): 5000 INJECTION INTRAVENOUS; SUBCUTANEOUS at 17:51

## 2019-02-02 RX ADMIN — Medication 20 MILLIGRAM(S): at 06:02

## 2019-02-02 RX ADMIN — AMIODARONE HYDROCHLORIDE 100 MILLIGRAM(S): 400 TABLET ORAL at 06:02

## 2019-02-02 RX ADMIN — HEPARIN SODIUM 5000 UNIT(S): 5000 INJECTION INTRAVENOUS; SUBCUTANEOUS at 06:03

## 2019-02-02 RX ADMIN — Medication 81 MILLIGRAM(S): at 12:08

## 2019-02-02 RX ADMIN — QUETIAPINE FUMARATE 25 MILLIGRAM(S): 200 TABLET, FILM COATED ORAL at 17:51

## 2019-02-02 NOTE — PROGRESS NOTE ADULT - PROBLEM SELECTOR PLAN 10
will not pursue further S&S eval as pt not a candidate for PEG; she pulled out hensley, if she pulls out a fresh PEG it can have catastrophic consequences including death  encourage PO of food she likes  DC Planning to [facility]

## 2019-02-02 NOTE — PROGRESS NOTE ADULT - SUBJECTIVE AND OBJECTIVE BOX
Patient is a 89y old  Female who presents with a chief complaint of weakness and diarrhea        SUBJECTIVE / OVERNIGHT EVENTS: no diarrhea per RN; pt getting vitals taken, yelling to have cuff removed; poor po intake      MEDICATIONS  (STANDING):  ALBUTerol/ipratropium for Nebulization 3 milliLiter(s) Nebulizer every 8 hours  amiodarone    Tablet 100 milliGRAM(s) Oral daily  aspirin  chewable 81 milliGRAM(s) Oral daily  atorvastatin 10 milliGRAM(s) Oral at bedtime  escitalopram 5 milliGRAM(s) Oral daily  furosemide    Tablet 20 milliGRAM(s) Oral daily  heparin  Injectable 5000 Unit(s) SubCutaneous every 12 hours  levothyroxine 100 MICROGram(s) Oral daily  multivitamin 1 Tablet(s) Oral daily  QUEtiapine 25 milliGRAM(s) Oral <User Schedule>  sodium chloride 0.9% lock flush 3 milliLiter(s) IV Push every 8 hours  sodium chloride 0.9%. 1000 milliLiter(s) (50 mL/Hr) IV Continuous <Continuous>  spironolactone 12.5 milliGRAM(s) Oral <User Schedule>    MEDICATIONS  (PRN):  acetaminophen   Tablet .. 650 milliGRAM(s) Oral every 6 hours PRN Mild Pain (1 - 3), Moderate Pain (4 - 6), Severe Pain (7 - 10)  benzonatate 100 milliGRAM(s) Oral every 8 hours PRN Cough      Vital Signs Last 24 Hrs  T(F): 98.6 (02 Feb 2019 12:05), Max: 98.6 (02 Feb 2019 12:05)  HR: 70 (02 Feb 2019 12:05) (63 - 72)  BP: 126/60 (02 Feb 2019 12:05) (108/58 - 128/62)  RR: 16 (02 Feb 2019 12:05) (16 - 18)  SpO2: 96% (02 Feb 2019 12:05) (96% - 99%)          PHYSICAL EXAM  GENERAL: NAD, well-developed  EYES: EOMI, PERRLA, conjunctiva and sclera clear  CHEST/LUNG: grossly clear  HEART: Regular rate and rhythm;   ABDOMEN: Soft, Nontender, Nondistended; Bowel sounds present  EXTREMITIES:  No clubbing, cyanosis, or edema  PSYCH: confused, yelling at times  : hensley with clear, dark yellow urine    LABS:                        8.9    10.27 )-----------( 407      ( 02 Feb 2019 05:43 )             29.7     02-02    140  |  105  |  23  ----------------------------<  137<H>  4.0   |  21<L>  |  0.76    Ca    8.7      02 Feb 2019 05:43  Mg     2.1     02-02

## 2019-02-02 NOTE — PROGRESS NOTE ADULT - PROBLEM SELECTOR PLAN 4
Resolved, pt appears euvolemic   Pulmonary edema & effusions per CT read suspect related to HF, has history of diastolic HF per our last TTE   -TTE reviewed -moderate aortic stenosis. Mild aortic regurgitation. Grossly  normal left ventricular systolic function  -c/w lasix 20 mg PO daily   -Strict I&Os, daily weights

## 2019-02-02 NOTE — PROGRESS NOTE ADULT - PROBLEM SELECTOR PLAN 5
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  In August has similar retention issues, thought 2/2 pain, seen by urology.  -s/p straight cath protocol x3, retained, hensley placed on 1/23 with 600cc of UOP  -Evaluated by Urology as inpatient for retention; recommend continuing Hensley catheter  -will need OP  f/u-

## 2019-02-03 LAB
ANION GAP SERPL CALC-SCNC: 13 MMO/L — SIGNIFICANT CHANGE UP (ref 7–14)
BASOPHILS # BLD AUTO: 0.04 K/UL — SIGNIFICANT CHANGE UP (ref 0–0.2)
BASOPHILS NFR BLD AUTO: 0.5 % — SIGNIFICANT CHANGE UP (ref 0–2)
BUN SERPL-MCNC: 22 MG/DL — SIGNIFICANT CHANGE UP (ref 7–23)
CALCIUM SERPL-MCNC: 8.7 MG/DL — SIGNIFICANT CHANGE UP (ref 8.4–10.5)
CHLORIDE SERPL-SCNC: 103 MMOL/L — SIGNIFICANT CHANGE UP (ref 98–107)
CO2 SERPL-SCNC: 21 MMOL/L — LOW (ref 22–31)
CREAT SERPL-MCNC: 0.67 MG/DL — SIGNIFICANT CHANGE UP (ref 0.5–1.3)
EOSINOPHIL # BLD AUTO: 0.2 K/UL — SIGNIFICANT CHANGE UP (ref 0–0.5)
EOSINOPHIL NFR BLD AUTO: 2.5 % — SIGNIFICANT CHANGE UP (ref 0–6)
GLUCOSE SERPL-MCNC: 109 MG/DL — HIGH (ref 70–99)
HCT VFR BLD CALC: 29.4 % — LOW (ref 34.5–45)
HGB BLD-MCNC: 8.8 G/DL — LOW (ref 11.5–15.5)
IMM GRANULOCYTES NFR BLD AUTO: 2.9 % — HIGH (ref 0–1.5)
LYMPHOCYTES # BLD AUTO: 1.34 K/UL — SIGNIFICANT CHANGE UP (ref 1–3.3)
LYMPHOCYTES # BLD AUTO: 16.4 % — SIGNIFICANT CHANGE UP (ref 13–44)
MAGNESIUM SERPL-MCNC: 2.1 MG/DL — SIGNIFICANT CHANGE UP (ref 1.6–2.6)
MCHC RBC-ENTMCNC: 29.6 PG — SIGNIFICANT CHANGE UP (ref 27–34)
MCHC RBC-ENTMCNC: 29.9 % — LOW (ref 32–36)
MCV RBC AUTO: 99 FL — SIGNIFICANT CHANGE UP (ref 80–100)
MONOCYTES # BLD AUTO: 0.81 K/UL — SIGNIFICANT CHANGE UP (ref 0–0.9)
MONOCYTES NFR BLD AUTO: 9.9 % — SIGNIFICANT CHANGE UP (ref 2–14)
NEUTROPHILS # BLD AUTO: 5.52 K/UL — SIGNIFICANT CHANGE UP (ref 1.8–7.4)
NEUTROPHILS NFR BLD AUTO: 67.8 % — SIGNIFICANT CHANGE UP (ref 43–77)
NRBC # FLD: 0 K/UL — LOW (ref 25–125)
PLATELET # BLD AUTO: 396 K/UL — SIGNIFICANT CHANGE UP (ref 150–400)
PMV BLD: 10.8 FL — SIGNIFICANT CHANGE UP (ref 7–13)
POTASSIUM SERPL-MCNC: 4.6 MMOL/L — SIGNIFICANT CHANGE UP (ref 3.5–5.3)
POTASSIUM SERPL-SCNC: 4.6 MMOL/L — SIGNIFICANT CHANGE UP (ref 3.5–5.3)
RBC # BLD: 2.97 M/UL — LOW (ref 3.8–5.2)
RBC # FLD: 16.4 % — HIGH (ref 10.3–14.5)
SODIUM SERPL-SCNC: 137 MMOL/L — SIGNIFICANT CHANGE UP (ref 135–145)
WBC # BLD: 8.15 K/UL — SIGNIFICANT CHANGE UP (ref 3.8–10.5)
WBC # FLD AUTO: 8.15 K/UL — SIGNIFICANT CHANGE UP (ref 3.8–10.5)

## 2019-02-03 PROCEDURE — 99232 SBSQ HOSP IP/OBS MODERATE 35: CPT

## 2019-02-03 RX ADMIN — Medication 81 MILLIGRAM(S): at 11:53

## 2019-02-03 RX ADMIN — AMIODARONE HYDROCHLORIDE 100 MILLIGRAM(S): 400 TABLET ORAL at 05:57

## 2019-02-03 RX ADMIN — ESCITALOPRAM OXALATE 5 MILLIGRAM(S): 10 TABLET, FILM COATED ORAL at 11:53

## 2019-02-03 RX ADMIN — HEPARIN SODIUM 5000 UNIT(S): 5000 INJECTION INTRAVENOUS; SUBCUTANEOUS at 05:57

## 2019-02-03 RX ADMIN — QUETIAPINE FUMARATE 25 MILLIGRAM(S): 200 TABLET, FILM COATED ORAL at 18:26

## 2019-02-03 RX ADMIN — Medication 20 MILLIGRAM(S): at 05:57

## 2019-02-03 RX ADMIN — Medication 1 TABLET(S): at 11:53

## 2019-02-03 RX ADMIN — ATORVASTATIN CALCIUM 10 MILLIGRAM(S): 80 TABLET, FILM COATED ORAL at 23:32

## 2019-02-03 RX ADMIN — SODIUM CHLORIDE 3 MILLILITER(S): 9 INJECTION INTRAMUSCULAR; INTRAVENOUS; SUBCUTANEOUS at 05:56

## 2019-02-03 RX ADMIN — Medication 3 MILLILITER(S): at 07:09

## 2019-02-03 RX ADMIN — SODIUM CHLORIDE 3 MILLILITER(S): 9 INJECTION INTRAMUSCULAR; INTRAVENOUS; SUBCUTANEOUS at 23:31

## 2019-02-03 RX ADMIN — Medication 3 MILLILITER(S): at 16:05

## 2019-02-03 RX ADMIN — SODIUM CHLORIDE 3 MILLILITER(S): 9 INJECTION INTRAMUSCULAR; INTRAVENOUS; SUBCUTANEOUS at 11:53

## 2019-02-03 RX ADMIN — Medication 100 MICROGRAM(S): at 05:57

## 2019-02-03 NOTE — PROGRESS NOTE ADULT - SUBJECTIVE AND OBJECTIVE BOX
Patient is a 89y old  Female who presents with a chief complaint of weakness and diarrhea         SUBJECTIVE / OVERNIGHT EVENTS: some loose BM, limited PO intake, drinking ensure      MEDICATIONS  (STANDING):  ALBUTerol/ipratropium for Nebulization 3 milliLiter(s) Nebulizer every 8 hours  amiodarone    Tablet 100 milliGRAM(s) Oral daily  aspirin  chewable 81 milliGRAM(s) Oral daily  atorvastatin 10 milliGRAM(s) Oral at bedtime  escitalopram 5 milliGRAM(s) Oral daily  furosemide    Tablet 20 milliGRAM(s) Oral daily  heparin  Injectable 5000 Unit(s) SubCutaneous every 12 hours  levothyroxine 100 MICROGram(s) Oral daily  multivitamin 1 Tablet(s) Oral daily  QUEtiapine 25 milliGRAM(s) Oral <User Schedule>  sodium chloride 0.9% lock flush 3 milliLiter(s) IV Push every 8 hours  sodium chloride 0.9%. 1000 milliLiter(s) (50 mL/Hr) IV Continuous <Continuous>  spironolactone 12.5 milliGRAM(s) Oral <User Schedule>    MEDICATIONS  (PRN):  acetaminophen   Tablet .. 650 milliGRAM(s) Oral every 6 hours PRN Mild Pain (1 - 3), Moderate Pain (4 - 6), Severe Pain (7 - 10)  benzonatate 100 milliGRAM(s) Oral every 8 hours PRN Cough      Vital Signs Last 24 Hrs  T(F): 97.4 (03 Feb 2019 05:54), Max: 98.1 (02 Feb 2019 21:26)  HR: 66 (03 Feb 2019 14:32) (66 - 74)  BP: 114/56 (03 Feb 2019 14:32) (105/58 - 118/52)  RR: 18 (03 Feb 2019 14:32) (16 - 18)  SpO2: 97% (03 Feb 2019 14:32) (96% - 98%)            PHYSICAL EXAM  GENERAL: NAD, well-developed  EYES: EOMI, PERRLA, conjunctiva and sclera clear  CHEST/LUNG: non labored  ABDOMEN: Soft, Nontender, Nondistended; Bowel sounds present  EXTREMITIES: No clubbing, cyanosis, or edema  ; hensley in place    LABS:                        8.8    8.15  )-----------( 396      ( 03 Feb 2019 06:25 )             29.4     02-03    137  |  103  |  22  ----------------------------<  109<H>  4.6   |  21<L>  |  0.67    Ca    8.7      03 Feb 2019 06:25  Mg     2.1     02-03

## 2019-02-03 NOTE — PROVIDER CONTACT NOTE (OTHER) - ASSESSMENT
No acute change in status at this time.
Bladder scan revealed retention of 311 ml urine. When approached with straight cath supplies patient is combative and screams "I don't want a catheter, give me two more minutes!". Attempt to orient revealed that the patient was A&Ox1, thinking she was in a hotel, education provided on reason for straight cath for retention, patient unable to teach back information. Patient reapproached later and refused again "Give me two more minutes!", not remembering previous attempts. IM Haldol given to calm patient for straight cath insertion. When approached again patient still combative and refusing straight cath.

## 2019-02-03 NOTE — PROGRESS NOTE ADULT - ASSESSMENT
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  Patient with ongoing retention, straight cath x 3, hensley placed.
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  Patient with ongoing retention, straight cath x 3, hensley placed.
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  Patient with ongoing retention, straight cath x 3, hensley placed.
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  Patient with ongoing retention, straight cath x 3, hensley placed this am with 600cc of urine.
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  Patient with ongoing retention, straight cath x 3, hensley placed.
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  Patient with ongoing retention, straight cath x 3, hensley placed.
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  Patient with ongoing retention, straight cath x 3, hensley placed.
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  Patient with ongoing retention, straight cath x 3, hensley placed.
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  Patient with ongoing retention, straight cath x 3, hensley placed.
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  Patient with ongoing retention, straight cath x 3, hensley placed.
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  Patient with ongoing retention, straight cath x 3, hensley placed.
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  Patient with ongoing retention, straight cath x 3, hensley placed.

## 2019-02-03 NOTE — CHART NOTE - NSCHARTNOTEFT_GEN_A_CORE
Source: Patient [ ]    Family [ ]     other [x ] RN, PCA.     Pt seen for nutrition consult for calorie count. Pt previously diagnosed with severe malnutrition. Per RN, pt continues to have minimal intake of meals, but she drinks the Ensure Enlive. No chewing or swallowing difficulties at this time. Pt continues to have loose stool. Per chart, pt is not a PEG candidate. Also refused PO trials during swallow evaluation 2/1.   2/1 1185 total calories (Ensure Enlive + ice cream)   2/2 435 total calories (Ensure Enlive).   From calorie count, pt is not meeting her estimated protein/energy needs.    Current Diet : Dysphagia 3 soft- nectar consistency fluids    PO intake:  < 50% [x ] 50-75% [ ]   % [ ]  other :  Current Weight:  (1/29) 155.4 pounds     __________________ Pertinent Medications__________________   MEDICATIONS  (STANDING):  ALBUTerol/ipratropium for Nebulization 3 milliLiter(s) Nebulizer every 8 hours  amiodarone    Tablet 100 milliGRAM(s) Oral daily  aspirin  chewable 81 milliGRAM(s) Oral daily  atorvastatin 10 milliGRAM(s) Oral at bedtime  escitalopram 5 milliGRAM(s) Oral daily  furosemide    Tablet 20 milliGRAM(s) Oral daily  heparin  Injectable 5000 Unit(s) SubCutaneous every 12 hours  levothyroxine 100 MICROGram(s) Oral daily  multivitamin 1 Tablet(s) Oral daily  QUEtiapine 25 milliGRAM(s) Oral <User Schedule>  sodium chloride 0.9% lock flush 3 milliLiter(s) IV Push every 8 hours  sodium chloride 0.9%. 1000 milliLiter(s) (50 mL/Hr) IV Continuous <Continuous>  spironolactone 12.5 milliGRAM(s) Oral <User Schedule>    MEDICATIONS  (PRN):  acetaminophen   Tablet .. 650 milliGRAM(s) Oral every 6 hours PRN Mild Pain (1 - 3), Moderate Pain (4 - 6), Severe Pain (7 - 10)  benzonatate 100 milliGRAM(s) Oral every 8 hours PRN Cough      __________________ Pertinent Labs__________________   02-03 Na137 mmol/L Glu 109 mg/dL<H> K+ 4.6 mmol/L Cr  0.67 mg/dL BUN 22 mg/dL 01-21 [id number] 5.1 % 01-21 Chol 75 mg/dL<L> LDL 24 mg/dL HDL 39 mg/dL<L> Trig 69 mg/dL        Skin: No edema, no pressure injuries     Estimated Needs:   [ x] no change since previous assessment  [ ] recalculated:       Previous Nutrition Diagnosis: severe malnutrition    Nutrition Diagnosis is [ x] ongoing- poor PO of meals.   [ ] resolved [ ] not applicable       Recommend    1. Consider d/c nectar consistency fluids and give thin liquids if no swallowing contraindications. With thin liquids, can add ice cream to tray.   2. Increase Ensure Enlive to 3x daily (1050 smiley, 60 gm protein).   3. Encourage PO intake and honor food preferences as able. Please provide full assistance with meals.      Monitoring and Evaluation:     [ x] PO intake [ x] Tolerance to diet prescription [x ] weights [ x] follow up per protocol  [ ] other:

## 2019-02-03 NOTE — PROGRESS NOTE ADULT - PROVIDER SPECIALTY LIST ADULT
Hospitalist
Internal Medicine
Internal Medicine
Hospitalist
Hospitalist

## 2019-02-03 NOTE — PROGRESS NOTE ADULT - ATTENDING COMMENTS
hensley to be replaced as pt has failed TOV in the past and should get urodynamics as outpt  dc planning to rehab at family's request

## 2019-02-03 NOTE — PROGRESS NOTE ADULT - REASON FOR ADMISSION
weakness and diarrhea
electronic

## 2019-02-04 VITALS
RESPIRATION RATE: 18 BRPM | SYSTOLIC BLOOD PRESSURE: 127 MMHG | DIASTOLIC BLOOD PRESSURE: 53 MMHG | OXYGEN SATURATION: 98 % | TEMPERATURE: 97 F | HEART RATE: 68 BPM

## 2019-02-04 LAB
ANION GAP SERPL CALC-SCNC: 14 MMO/L — SIGNIFICANT CHANGE UP (ref 7–14)
BASOPHILS # BLD AUTO: 0.06 K/UL — SIGNIFICANT CHANGE UP (ref 0–0.2)
BASOPHILS NFR BLD AUTO: 0.7 % — SIGNIFICANT CHANGE UP (ref 0–2)
BUN SERPL-MCNC: 23 MG/DL — SIGNIFICANT CHANGE UP (ref 7–23)
CALCIUM SERPL-MCNC: 8.7 MG/DL — SIGNIFICANT CHANGE UP (ref 8.4–10.5)
CHLORIDE SERPL-SCNC: 103 MMOL/L — SIGNIFICANT CHANGE UP (ref 98–107)
CO2 SERPL-SCNC: 22 MMOL/L — SIGNIFICANT CHANGE UP (ref 22–31)
CREAT SERPL-MCNC: 0.77 MG/DL — SIGNIFICANT CHANGE UP (ref 0.5–1.3)
EOSINOPHIL # BLD AUTO: 0.19 K/UL — SIGNIFICANT CHANGE UP (ref 0–0.5)
EOSINOPHIL NFR BLD AUTO: 2.1 % — SIGNIFICANT CHANGE UP (ref 0–6)
GLUCOSE SERPL-MCNC: 104 MG/DL — HIGH (ref 70–99)
HCT VFR BLD CALC: 28.6 % — LOW (ref 34.5–45)
HGB BLD-MCNC: 8.6 G/DL — LOW (ref 11.5–15.5)
IMM GRANULOCYTES NFR BLD AUTO: 3.7 % — HIGH (ref 0–1.5)
LYMPHOCYTES # BLD AUTO: 1.89 K/UL — SIGNIFICANT CHANGE UP (ref 1–3.3)
LYMPHOCYTES # BLD AUTO: 21.4 % — SIGNIFICANT CHANGE UP (ref 13–44)
MAGNESIUM SERPL-MCNC: 2.1 MG/DL — SIGNIFICANT CHANGE UP (ref 1.6–2.6)
MCHC RBC-ENTMCNC: 29.7 PG — SIGNIFICANT CHANGE UP (ref 27–34)
MCHC RBC-ENTMCNC: 30.1 % — LOW (ref 32–36)
MCV RBC AUTO: 98.6 FL — SIGNIFICANT CHANGE UP (ref 80–100)
MONOCYTES # BLD AUTO: 1.1 K/UL — HIGH (ref 0–0.9)
MONOCYTES NFR BLD AUTO: 12.4 % — SIGNIFICANT CHANGE UP (ref 2–14)
NEUTROPHILS # BLD AUTO: 5.27 K/UL — SIGNIFICANT CHANGE UP (ref 1.8–7.4)
NEUTROPHILS NFR BLD AUTO: 59.7 % — SIGNIFICANT CHANGE UP (ref 43–77)
NRBC # FLD: 0 K/UL — LOW (ref 25–125)
PLATELET # BLD AUTO: 409 K/UL — HIGH (ref 150–400)
PMV BLD: 10.5 FL — SIGNIFICANT CHANGE UP (ref 7–13)
POTASSIUM SERPL-MCNC: 3.7 MMOL/L — SIGNIFICANT CHANGE UP (ref 3.5–5.3)
POTASSIUM SERPL-SCNC: 3.7 MMOL/L — SIGNIFICANT CHANGE UP (ref 3.5–5.3)
RBC # BLD: 2.9 M/UL — LOW (ref 3.8–5.2)
RBC # FLD: 16.5 % — HIGH (ref 10.3–14.5)
SODIUM SERPL-SCNC: 139 MMOL/L — SIGNIFICANT CHANGE UP (ref 135–145)
WBC # BLD: 8.84 K/UL — SIGNIFICANT CHANGE UP (ref 3.8–10.5)
WBC # FLD AUTO: 8.84 K/UL — SIGNIFICANT CHANGE UP (ref 3.8–10.5)

## 2019-02-04 PROCEDURE — 99239 HOSP IP/OBS DSCHRG MGMT >30: CPT

## 2019-02-04 RX ORDER — LINACLOTIDE 145 UG/1
1 CAPSULE, GELATIN COATED ORAL
Qty: 0 | Refills: 0 | COMMUNITY

## 2019-02-04 RX ORDER — IPRATROPIUM/ALBUTEROL SULFATE 18-103MCG
3 AEROSOL WITH ADAPTER (GRAM) INHALATION
Qty: 0 | Refills: 0 | COMMUNITY
Start: 2019-02-04

## 2019-02-04 RX ORDER — QUETIAPINE FUMARATE 200 MG/1
1 TABLET, FILM COATED ORAL
Qty: 0 | Refills: 0 | COMMUNITY

## 2019-02-04 RX ORDER — FUROSEMIDE 40 MG
1 TABLET ORAL
Qty: 0 | Refills: 0 | COMMUNITY

## 2019-02-04 RX ORDER — MULTIVIT-MIN/FERROUS GLUCONATE 9 MG/15 ML
1 LIQUID (ML) ORAL
Qty: 0 | Refills: 0 | COMMUNITY

## 2019-02-04 RX ORDER — ACETAMINOPHEN 500 MG
2 TABLET ORAL
Qty: 0 | Refills: 0 | COMMUNITY
Start: 2019-02-04

## 2019-02-04 RX ADMIN — Medication 3 MILLILITER(S): at 15:10

## 2019-02-04 RX ADMIN — ESCITALOPRAM OXALATE 5 MILLIGRAM(S): 10 TABLET, FILM COATED ORAL at 12:24

## 2019-02-04 RX ADMIN — Medication 1 TABLET(S): at 12:25

## 2019-02-04 RX ADMIN — AMIODARONE HYDROCHLORIDE 100 MILLIGRAM(S): 400 TABLET ORAL at 06:02

## 2019-02-04 RX ADMIN — SODIUM CHLORIDE 3 MILLILITER(S): 9 INJECTION INTRAMUSCULAR; INTRAVENOUS; SUBCUTANEOUS at 05:49

## 2019-02-04 RX ADMIN — Medication 20 MILLIGRAM(S): at 06:03

## 2019-02-04 RX ADMIN — SODIUM CHLORIDE 3 MILLILITER(S): 9 INJECTION INTRAMUSCULAR; INTRAVENOUS; SUBCUTANEOUS at 13:32

## 2019-02-04 RX ADMIN — HEPARIN SODIUM 5000 UNIT(S): 5000 INJECTION INTRAVENOUS; SUBCUTANEOUS at 06:02

## 2019-02-04 RX ADMIN — Medication 81 MILLIGRAM(S): at 12:24

## 2019-02-04 RX ADMIN — Medication 100 MICROGRAM(S): at 06:02

## 2019-02-04 NOTE — CHART NOTE - NSCHARTNOTEFT_GEN_A_CORE
pt seen and examined by me  planned for dc today  diarrhea has resolved  eats minimal, drinks ensure  can liberalize diet   medically stable for dc to rehab  34 min spent with dc planning

## 2019-02-09 NOTE — PROGRESS NOTE ADULT - PROBLEM SELECTOR PLAN 10
Additional Complaints - Diet: dysphagia diet, thickened liquids; aspiration precautions   - DVT PPx: Lovenox 40mg QD  - DNR/DNI

## 2019-02-16 ENCOUNTER — INPATIENT (INPATIENT)
Facility: HOSPITAL | Age: 84
LOS: 5 days | Discharge: HOSPICE | End: 2019-02-22
Attending: INTERNAL MEDICINE | Admitting: INTERNAL MEDICINE
Payer: COMMERCIAL

## 2019-02-16 VITALS
OXYGEN SATURATION: 96 % | RESPIRATION RATE: 20 BRPM | SYSTOLIC BLOOD PRESSURE: 129 MMHG | TEMPERATURE: 98 F | HEART RATE: 76 BPM | DIASTOLIC BLOOD PRESSURE: 72 MMHG

## 2019-02-16 DIAGNOSIS — Z95.828 PRESENCE OF OTHER VASCULAR IMPLANTS AND GRAFTS: Chronic | ICD-10-CM

## 2019-02-16 DIAGNOSIS — F01.50 VASCULAR DEMENTIA WITHOUT BEHAVIORAL DISTURBANCE: ICD-10-CM

## 2019-02-16 DIAGNOSIS — I49.5 SICK SINUS SYNDROME: ICD-10-CM

## 2019-02-16 DIAGNOSIS — G93.41 METABOLIC ENCEPHALOPATHY: ICD-10-CM

## 2019-02-16 DIAGNOSIS — I50.32 CHRONIC DIASTOLIC (CONGESTIVE) HEART FAILURE: ICD-10-CM

## 2019-02-16 DIAGNOSIS — N28.89 OTHER SPECIFIED DISORDERS OF KIDNEY AND URETER: ICD-10-CM

## 2019-02-16 DIAGNOSIS — J12.9 VIRAL PNEUMONIA, UNSPECIFIED: ICD-10-CM

## 2019-02-16 DIAGNOSIS — T83.511A INFECTION AND INFLAMMATORY REACTION DUE TO INDWELLING URETHRAL CATHETER, INITIAL ENCOUNTER: ICD-10-CM

## 2019-02-16 DIAGNOSIS — Z29.9 ENCOUNTER FOR PROPHYLACTIC MEASURES, UNSPECIFIED: ICD-10-CM

## 2019-02-16 DIAGNOSIS — N39.0 URINARY TRACT INFECTION, SITE NOT SPECIFIED: ICD-10-CM

## 2019-02-16 DIAGNOSIS — E03.9 HYPOTHYROIDISM, UNSPECIFIED: ICD-10-CM

## 2019-02-16 LAB
ALBUMIN SERPL ELPH-MCNC: 3.1 G/DL — LOW (ref 3.3–5)
ALP SERPL-CCNC: 106 U/L — SIGNIFICANT CHANGE UP (ref 40–120)
ALT FLD-CCNC: 10 U/L — SIGNIFICANT CHANGE UP (ref 4–33)
ANION GAP SERPL CALC-SCNC: 14 MMO/L — SIGNIFICANT CHANGE UP (ref 7–14)
APPEARANCE UR: SIGNIFICANT CHANGE UP
AST SERPL-CCNC: 26 U/L — SIGNIFICANT CHANGE UP (ref 4–32)
B PERT DNA SPEC QL NAA+PROBE: NOT DETECTED — SIGNIFICANT CHANGE UP
BACTERIA # UR AUTO: HIGH
BASE EXCESS BLDV CALC-SCNC: 2.7 MMOL/L — SIGNIFICANT CHANGE UP
BASOPHILS # BLD AUTO: 0.04 K/UL — SIGNIFICANT CHANGE UP (ref 0–0.2)
BASOPHILS NFR BLD AUTO: 0.5 % — SIGNIFICANT CHANGE UP (ref 0–2)
BILIRUB SERPL-MCNC: 0.4 MG/DL — SIGNIFICANT CHANGE UP (ref 0.2–1.2)
BILIRUB UR-MCNC: NEGATIVE — SIGNIFICANT CHANGE UP
BLOOD GAS VENOUS - CREATININE: 0.86 MG/DL — SIGNIFICANT CHANGE UP (ref 0.5–1.3)
BLOOD UR QL VISUAL: NEGATIVE — SIGNIFICANT CHANGE UP
BUN SERPL-MCNC: 29 MG/DL — HIGH (ref 7–23)
C PNEUM DNA SPEC QL NAA+PROBE: NOT DETECTED — SIGNIFICANT CHANGE UP
CALCIUM SERPL-MCNC: 8.4 MG/DL — SIGNIFICANT CHANGE UP (ref 8.4–10.5)
CHLORIDE BLDV-SCNC: 105 MMOL/L — SIGNIFICANT CHANGE UP (ref 96–108)
CHLORIDE SERPL-SCNC: 101 MMOL/L — SIGNIFICANT CHANGE UP (ref 98–107)
CO2 SERPL-SCNC: 24 MMOL/L — SIGNIFICANT CHANGE UP (ref 22–31)
COLOR SPEC: YELLOW — SIGNIFICANT CHANGE UP
CREAT SERPL-MCNC: 0.9 MG/DL — SIGNIFICANT CHANGE UP (ref 0.5–1.3)
EOSINOPHIL # BLD AUTO: 0.16 K/UL — SIGNIFICANT CHANGE UP (ref 0–0.5)
EOSINOPHIL NFR BLD AUTO: 1.8 % — SIGNIFICANT CHANGE UP (ref 0–6)
FLUAV H1 2009 PAND RNA SPEC QL NAA+PROBE: NOT DETECTED — SIGNIFICANT CHANGE UP
FLUAV H1 RNA SPEC QL NAA+PROBE: NOT DETECTED — SIGNIFICANT CHANGE UP
FLUAV H3 RNA SPEC QL NAA+PROBE: NOT DETECTED — SIGNIFICANT CHANGE UP
FLUAV SUBTYP SPEC NAA+PROBE: NOT DETECTED — SIGNIFICANT CHANGE UP
FLUBV RNA SPEC QL NAA+PROBE: NOT DETECTED — SIGNIFICANT CHANGE UP
GAS PNL BLDV: 139 MMOL/L — SIGNIFICANT CHANGE UP (ref 136–146)
GLUCOSE BLDV-MCNC: 109 — HIGH (ref 70–99)
GLUCOSE SERPL-MCNC: 108 MG/DL — HIGH (ref 70–99)
GLUCOSE UR-MCNC: NEGATIVE — SIGNIFICANT CHANGE UP
HADV DNA SPEC QL NAA+PROBE: NOT DETECTED — SIGNIFICANT CHANGE UP
HCO3 BLDV-SCNC: 25 MMOL/L — SIGNIFICANT CHANGE UP (ref 20–27)
HCOV PNL SPEC NAA+PROBE: SIGNIFICANT CHANGE UP
HCT VFR BLD CALC: 33.7 % — LOW (ref 34.5–45)
HCT VFR BLDV CALC: 31.6 % — LOW (ref 34.5–45)
HGB BLD-MCNC: 9.9 G/DL — LOW (ref 11.5–15.5)
HGB BLDV-MCNC: 10.2 G/DL — LOW (ref 11.5–15.5)
HMPV RNA SPEC QL NAA+PROBE: DETECTED — HIGH
HPIV1 RNA SPEC QL NAA+PROBE: NOT DETECTED — SIGNIFICANT CHANGE UP
HPIV2 RNA SPEC QL NAA+PROBE: NOT DETECTED — SIGNIFICANT CHANGE UP
HPIV3 RNA SPEC QL NAA+PROBE: NOT DETECTED — SIGNIFICANT CHANGE UP
HPIV4 RNA SPEC QL NAA+PROBE: NOT DETECTED — SIGNIFICANT CHANGE UP
IMM GRANULOCYTES NFR BLD AUTO: 2.4 % — HIGH (ref 0–1.5)
KETONES UR-MCNC: NEGATIVE — SIGNIFICANT CHANGE UP
LACTATE BLDV-MCNC: 2.4 MMOL/L — HIGH (ref 0.5–2)
LEUKOCYTE ESTERASE UR-ACNC: HIGH
LYMPHOCYTES # BLD AUTO: 1.81 K/UL — SIGNIFICANT CHANGE UP (ref 1–3.3)
LYMPHOCYTES # BLD AUTO: 20.5 % — SIGNIFICANT CHANGE UP (ref 13–44)
MCHC RBC-ENTMCNC: 29.3 PG — SIGNIFICANT CHANGE UP (ref 27–34)
MCHC RBC-ENTMCNC: 29.4 % — LOW (ref 32–36)
MCV RBC AUTO: 99.7 FL — SIGNIFICANT CHANGE UP (ref 80–100)
MONOCYTES # BLD AUTO: 0.7 K/UL — SIGNIFICANT CHANGE UP (ref 0–0.9)
MONOCYTES NFR BLD AUTO: 7.9 % — SIGNIFICANT CHANGE UP (ref 2–14)
MUCOUS THREADS # UR AUTO: SIGNIFICANT CHANGE UP
NEUTROPHILS # BLD AUTO: 5.92 K/UL — SIGNIFICANT CHANGE UP (ref 1.8–7.4)
NEUTROPHILS NFR BLD AUTO: 66.9 % — SIGNIFICANT CHANGE UP (ref 43–77)
NITRITE UR-MCNC: POSITIVE — SIGNIFICANT CHANGE UP
NRBC # FLD: 0 K/UL — LOW (ref 25–125)
PCO2 BLDV: 51 MMHG — SIGNIFICANT CHANGE UP (ref 41–51)
PH BLDV: 7.36 PH — SIGNIFICANT CHANGE UP (ref 7.32–7.43)
PH UR: 7.5 — SIGNIFICANT CHANGE UP (ref 5–8)
PLATELET # BLD AUTO: 392 K/UL — SIGNIFICANT CHANGE UP (ref 150–400)
PMV BLD: 10.5 FL — SIGNIFICANT CHANGE UP (ref 7–13)
PO2 BLDV: < 24 MMHG — LOW (ref 35–40)
POTASSIUM BLDV-SCNC: 3.8 MMOL/L — SIGNIFICANT CHANGE UP (ref 3.4–4.5)
POTASSIUM SERPL-MCNC: 4.3 MMOL/L — SIGNIFICANT CHANGE UP (ref 3.5–5.3)
POTASSIUM SERPL-SCNC: 4.3 MMOL/L — SIGNIFICANT CHANGE UP (ref 3.5–5.3)
PROT SERPL-MCNC: 7 G/DL — SIGNIFICANT CHANGE UP (ref 6–8.3)
PROT UR-MCNC: 200 — HIGH
RBC # BLD: 3.38 M/UL — LOW (ref 3.8–5.2)
RBC # FLD: 18.5 % — HIGH (ref 10.3–14.5)
RBC CASTS # UR COMP ASSIST: SIGNIFICANT CHANGE UP (ref 0–?)
RSV RNA SPEC QL NAA+PROBE: DETECTED — HIGH
RV+EV RNA SPEC QL NAA+PROBE: NOT DETECTED — SIGNIFICANT CHANGE UP
SAO2 % BLDV: 24.1 % — LOW (ref 60–85)
SODIUM SERPL-SCNC: 139 MMOL/L — SIGNIFICANT CHANGE UP (ref 135–145)
SP GR SPEC: 1.02 — SIGNIFICANT CHANGE UP (ref 1–1.04)
SQUAMOUS # UR AUTO: SIGNIFICANT CHANGE UP
UROBILINOGEN FLD QL: SIGNIFICANT CHANGE UP
WBC # BLD: 8.84 K/UL — SIGNIFICANT CHANGE UP (ref 3.8–10.5)
WBC # FLD AUTO: 8.84 K/UL — SIGNIFICANT CHANGE UP (ref 3.8–10.5)
WBC UR QL: >50 — HIGH (ref 0–?)

## 2019-02-16 PROCEDURE — 71045 X-RAY EXAM CHEST 1 VIEW: CPT | Mod: 26

## 2019-02-16 PROCEDURE — 99223 1ST HOSP IP/OBS HIGH 75: CPT

## 2019-02-16 RX ORDER — LEVOTHYROXINE SODIUM 125 MCG
100 TABLET ORAL DAILY
Qty: 0 | Refills: 0 | Status: DISCONTINUED | OUTPATIENT
Start: 2019-02-16 | End: 2019-02-22

## 2019-02-16 RX ORDER — ERTAPENEM SODIUM 1 G/1
1000 INJECTION, POWDER, LYOPHILIZED, FOR SOLUTION INTRAMUSCULAR; INTRAVENOUS EVERY 24 HOURS
Qty: 0 | Refills: 0 | Status: COMPLETED | OUTPATIENT
Start: 2019-02-16 | End: 2019-02-20

## 2019-02-16 RX ORDER — AMIODARONE HYDROCHLORIDE 400 MG/1
100 TABLET ORAL DAILY
Qty: 0 | Refills: 0 | Status: DISCONTINUED | OUTPATIENT
Start: 2019-02-16 | End: 2019-02-22

## 2019-02-16 RX ORDER — IPRATROPIUM/ALBUTEROL SULFATE 18-103MCG
3 AEROSOL WITH ADAPTER (GRAM) INHALATION EVERY 6 HOURS
Qty: 0 | Refills: 0 | Status: DISCONTINUED | OUTPATIENT
Start: 2019-02-16 | End: 2019-02-18

## 2019-02-16 RX ORDER — ESCITALOPRAM OXALATE 10 MG/1
5 TABLET, FILM COATED ORAL DAILY
Qty: 0 | Refills: 0 | Status: DISCONTINUED | OUTPATIENT
Start: 2019-02-16 | End: 2019-02-22

## 2019-02-16 RX ORDER — ASPIRIN/CALCIUM CARB/MAGNESIUM 324 MG
81 TABLET ORAL DAILY
Qty: 0 | Refills: 0 | Status: DISCONTINUED | OUTPATIENT
Start: 2019-02-16 | End: 2019-02-22

## 2019-02-16 RX ORDER — SODIUM CHLORIDE 9 MG/ML
500 INJECTION INTRAMUSCULAR; INTRAVENOUS; SUBCUTANEOUS ONCE
Qty: 0 | Refills: 0 | Status: COMPLETED | OUTPATIENT
Start: 2019-02-16 | End: 2019-02-16

## 2019-02-16 RX ORDER — QUETIAPINE FUMARATE 200 MG/1
25 TABLET, FILM COATED ORAL DAILY
Qty: 0 | Refills: 0 | Status: DISCONTINUED | OUTPATIENT
Start: 2019-02-16 | End: 2019-02-22

## 2019-02-16 RX ORDER — HEPARIN SODIUM 5000 [USP'U]/ML
5000 INJECTION INTRAVENOUS; SUBCUTANEOUS EVERY 12 HOURS
Qty: 0 | Refills: 0 | Status: DISCONTINUED | OUTPATIENT
Start: 2019-02-16 | End: 2019-02-22

## 2019-02-16 RX ORDER — SPIRONOLACTONE 25 MG/1
12.5 TABLET, FILM COATED ORAL DAILY
Qty: 0 | Refills: 0 | Status: DISCONTINUED | OUTPATIENT
Start: 2019-02-16 | End: 2019-02-17

## 2019-02-16 RX ORDER — FUROSEMIDE 40 MG
20 TABLET ORAL DAILY
Qty: 0 | Refills: 0 | Status: DISCONTINUED | OUTPATIENT
Start: 2019-02-16 | End: 2019-02-17

## 2019-02-16 RX ADMIN — Medication 1 TABLET(S): at 18:30

## 2019-02-16 RX ADMIN — Medication 3 MILLILITER(S): at 22:43

## 2019-02-16 RX ADMIN — Medication 81 MILLIGRAM(S): at 18:30

## 2019-02-16 RX ADMIN — ERTAPENEM SODIUM 120 MILLIGRAM(S): 1 INJECTION, POWDER, LYOPHILIZED, FOR SOLUTION INTRAMUSCULAR; INTRAVENOUS at 16:47

## 2019-02-16 RX ADMIN — HEPARIN SODIUM 5000 UNIT(S): 5000 INJECTION INTRAVENOUS; SUBCUTANEOUS at 18:30

## 2019-02-16 RX ADMIN — QUETIAPINE FUMARATE 25 MILLIGRAM(S): 200 TABLET, FILM COATED ORAL at 18:30

## 2019-02-16 RX ADMIN — SODIUM CHLORIDE 500 MILLILITER(S): 9 INJECTION INTRAMUSCULAR; INTRAVENOUS; SUBCUTANEOUS at 14:07

## 2019-02-16 NOTE — H&P ADULT - ASSESSMENT
89y Female with PMH of advanced dementia, undiagnosed kidney tumor , hemorraghic CVA, LE DVT s/p IVC filter, HFpEF, SSS s/p PPM, urinary retention with indwelling  hensley p/w acute metabolic encephalopath likely 2/2 hensley associated UTI and viral PNA

## 2019-02-16 NOTE — ED PROVIDER NOTE - OBJECTIVE STATEMENT
89 y/o female hx of Dementia, hypothyroid, AS, CVA, UTI, DVTs w/ IVC filters, recent admission for UTI w/ hensley placed, d/c to [facility], home for few days, presents today w/ Hensley not draining. Per family, for just over 24 hours they have not noticed any drainage from hensley. Patient reports the urge to urinate but is not. No f/c, N/V/. Does report a worsening cough. No diaphoresis per family. Patient is at her baseline mental status.

## 2019-02-16 NOTE — ED PROVIDER NOTE - PROGRESS NOTE DETAILS
Anshu Mena (Resident): bedside US w/ hensley balloon in the bladder w/ a 6x3.6x3.0 cm dimentions, so likely just not producing much urine

## 2019-02-16 NOTE — H&P ADULT - PROBLEM SELECTOR PLAN 2
UA grossly positive, hx of ESBL proteus UTI  -start ertapenem  -f/u urine cx   -hensley changed in ER. will monitor output. TOV once infection is treated

## 2019-02-16 NOTE — H&P ADULT - PROBLEM SELECTOR PLAN 7
-cont seroquel and Lexapro given hx of severe agitation 2/2 dementia. low threshold to hold if MS worsens  -palliative care consult for GOC- family interested in hospice   -known dysphagia. as per chart family does not want feeding tube. wants pleasure feeds

## 2019-02-16 NOTE — H&P ADULT - HISTORY OF PRESENT ILLNESS
HPI:    89y Female with PMH of advanced dementia, kidney mass not worked up, hemorraghic CVA, LE DVT s/p IVC filter, HFpEF, SSS s/p PPM, hypothyroidism, recent admission to [facility] in 2019 for respiratory distress 2/2 coronavirus when she was found to have urinary retention discharged with indwelling hensley     PAST MEDICAL & SURGICAL HISTORY:  Hypothyroidism, unspecified type  Macular degeneration  Post menopausal problems: ~ bleeding (no previous evaluation for same)  Cerebrovascular accident (CVA) due to other mechanism: ~ hemorrhagic  Hard of hearing, bilateral  DVT (deep venous thrombosis)  Kidney tumor  CHF (Congestive Heart Failure): ~ diastolic, Stage I (2016)  Dementia  S/P IVC filter      Review of Systems:   CONSTITUTIONAL: No fever, weight loss, or fatigue  EYES: No eye pain, visual disturbances, or discharge  ENMT:  No difficulty hearing, tinnitus, vertigo; No sinus or throat pain  NECK: No pain or stiffness  BREASTS: No pain, masses, or nipple discharge  RESPIRATORY: No cough, wheezing, chills or hemoptysis; No shortness of breath  CARDIOVASCULAR: No chest pain, palpitations, dizziness, or leg swelling  GASTROINTESTINAL: No abdominal or epigastric pain. No nausea, vomiting, or hematemesis; No diarrhea or constipation. No melena or hematochezia.  GENITOURINARY: No dysuria, frequency, hematuria, or incontinence  NEUROLOGICAL: No headaches, memory loss, loss of strength, numbness, or tremors  SKIN: No itching, burning, rashes, or lesions   LYMPH NODES: No enlarged glands  ENDOCRINE: No heat or cold intolerance; No hair loss  MUSCULOSKELETAL: No joint pain or swelling; No muscle, back, or extremity pain  PSYCHIATRIC: No depression, anxiety, mood swings, or difficulty sleeping  HEME/LYMPH: No easy bruising, or bleeding gums  ALLERGY AND IMMUNOLOGIC: No hives or eczema    Allergies    No Known Allergies    Intolerances        Social History:     FAMILY HISTORY:  No pertinent family history in first degree relatives      MEDICATIONS  (STANDING):  ALBUTerol/ipratropium for Nebulization 3 milliLiter(s) Nebulizer every 6 hours  amiodarone    Tablet 100 milliGRAM(s) Oral daily  aspirin  chewable 81 milliGRAM(s) Oral daily  ertapenem  IVPB 1000 milliGRAM(s) IV Intermittent every 24 hours  escitalopram 5 milliGRAM(s) Oral daily  furosemide    Tablet 20 milliGRAM(s) Oral daily  heparin  Injectable 5000 Unit(s) SubCutaneous every 12 hours  levothyroxine 100 MICROGram(s) Oral daily  multivitamin 1 Tablet(s) Oral daily  QUEtiapine 25 milliGRAM(s) Oral daily  spironolactone 12.5 milliGRAM(s) Oral daily    MEDICATIONS  (PRN):  guaiFENesin   Syrup  (Sugar-Free) 100 milliGRAM(s) Oral every 6 hours PRN Cough      T(C): 36.9 (19 @ 17:48), Max: 37.3 (19 @ 14:15)  HR: 74 (19 @ 17:48) (66 - 76)  BP: 123/65 (19 @ 17:48) (123/65 - 144/67)  RR: 18 (19 @ 17:48) (18 - 20)  SpO2: 97% (19 @ 17:48) (96% - 99%)    CAPILLARY BLOOD GLUCOSE        I&O's Summary      PHYSICAL EXAM:  GENERAL: NAD, well-developed  HEAD:  Atraumatic, Normocephalic  EYES: EOMI, PERRLA, conjunctiva and sclera clear  NECK: Supple, No elevated JVD  CHEST/LUNG: Clear to auscultation bilaterally; No wheeze  HEART: Regular rate and rhythm; No murmurs, rubs, or gallops  ABDOMEN: Soft, Nontender, Nondistended; Bowel sounds present  EXTREMITIES:  2+ Peripheral Pulses, No clubbing, cyanosis, or edema  PSYCH: AAOx3  NEUROLOGY: CN II-XII grossly intact, moving all extremities  SKIN: No rashes or lesions    LABS:                        9.9    8.84  )-----------( 392      ( 2019 13:41 )             33.7     02-16    139  |  101  |  29<H>  ----------------------------<  108<H>  4.3   |  24  |  0.90    Ca    8.4      2019 13:41    TPro  7.0  /  Alb  3.1<L>  /  TBili  0.4  /  DBili  x   /  AST  26  /  ALT  10  /  AlkPhos  106  02-          Urinalysis Basic - ( 2019 14:32 )    Color: YELLOW / Appearance: TURBID / S.023 / pH: 7.5  Gluc: NEGATIVE / Ketone: NEGATIVE  / Bili: NEGATIVE / Urobili: TRACE   Blood: NEGATIVE / Protein: 200 / Nitrite: POSITIVE   Leuk Esterase: LARGE / RBC: 2-5 / WBC >50   Sq Epi: FEW / Non Sq Epi: x / Bacteria: MANY          RADIOLOGY & ADDITIONAL TESTS:    ECG Personally Reviewed -     Imaging Personally Reviewed:    Consultant(s) Notes Reviewed:      Care Discussed with Consultants/Other Providers: HPI:    89y Female with PMH of advanced dementia, kidney mass not worked up, hemorraghic CVA, LE DVT s/p IVC filter, HFpEF, SSS s/p PPM, hypothyroidism, recent admission to [facility] in 2019 for respiratory distress 2/2 coronavirus when she was found to have urinary retention discharged with indwelling hensley now sent by family as they noted pt's hensley has no output for >24hr and she has been coughing more than usual. Pt is demented unable to provide history. As per Aide Pt appears more lethargic in the past 3 days and is barely eating. She has persistent cough since last hospitalization, but worse in the past few with more mucus production. She denies any fever, chills, sob, n/v/d or signs of pain. Pt's hensley has had steady output until the day prior to the admission, although pt does not seem to have obvious discomfort from the hensley.    In ER, pt is AF, VSS. Labs notable for grossly positive UA. RVP + for RSV and hMPV. Pt's hensley was changed with 200cc urine drained. Admitted for Viral PNA and UTI     PAST MEDICAL & SURGICAL HISTORY:  Hypothyroidism, unspecified type  Macular degeneration  Post menopausal problems: ~ bleeding (no previous evaluation for same)  Cerebrovascular accident (CVA) due to other mechanism: ~ hemorrhagic  Hard of hearing, bilateral  DVT (deep venous thrombosis)  Kidney tumor  CHF (Congestive Heart Failure): ~ diastolic, Stage I (2016)  Dementia  S/P IVC filter      Review of Systems:     unable to obtain      Allergies    No Known Allergies    Intolerances        Social History:   Denies ETOH, tobacco or illicit drug use     FAMILY HISTORY:  No pertinent family history in first degree relatives      MEDICATIONS  (STANDING):  ALBUTerol/ipratropium for Nebulization 3 milliLiter(s) Nebulizer every 6 hours  amiodarone    Tablet 100 milliGRAM(s) Oral daily  aspirin  chewable 81 milliGRAM(s) Oral daily  ertapenem  IVPB 1000 milliGRAM(s) IV Intermittent every 24 hours  escitalopram 5 milliGRAM(s) Oral daily  furosemide    Tablet 20 milliGRAM(s) Oral daily  heparin  Injectable 5000 Unit(s) SubCutaneous every 12 hours  levothyroxine 100 MICROGram(s) Oral daily  multivitamin 1 Tablet(s) Oral daily  QUEtiapine 25 milliGRAM(s) Oral daily  spironolactone 12.5 milliGRAM(s) Oral daily    MEDICATIONS  (PRN):  guaiFENesin   Syrup  (Sugar-Free) 100 milliGRAM(s) Oral every 6 hours PRN Cough      T(C): 36.9 (19 @ 17:48), Max: 37.3 (19 @ 14:15)  HR: 74 (19 @ 17:48) (66 - 76)  BP: 123/65 (19 @ 17:48) (123/65 - 144/67)  RR: 18 (19 @ 17:48) (18 - 20)  SpO2: 97% (19 @ 17:48) (96% - 99%)    CAPILLARY BLOOD GLUCOSE        I&O's Summary      PHYSICAL EXAM:  GENERAL: chronically ill appearing elderly woman lying in bed in NAD  HEAD:  Atraumatic, Normocephalic  EYES: EOMI, PERRLA, conjunctiva and sclera clear  NECK: Supple, No elevated JVD  CHEST/LUNG: rhonchi bilaterally; No wheeze  HEART: Regular rate and rhythm; No murmurs, rubs, or gallops  ABDOMEN: Soft, Nontender, Nondistended; Bowel sounds present  EXTREMITIES:  2+ Peripheral Pulses, No clubbing, cyanosis, or edema  PSYCH: Awake, AAOx0  NEUROLOGY: CN II-XII grossly intact, moving all extremities  SKIN: No rashes or lesions    LABS:                        9.9    8.84  )-----------( 392      ( 2019 13:41 )             33.7     02-    139  |  101  |  29<H>  ----------------------------<  108<H>  4.3   |  24  |  0.90    Ca    8.4      2019 13:41    TPro  7.0  /  Alb  3.1<L>  /  TBili  0.4  /  DBili  x   /  AST  26  /  ALT  10  /  AlkPhos  106  02-          Urinalysis Basic - ( 2019 14:32 )    Color: YELLOW / Appearance: TURBID / S.023 / pH: 7.5  Gluc: NEGATIVE / Ketone: NEGATIVE  / Bili: NEGATIVE / Urobili: TRACE   Blood: NEGATIVE / Protein: 200 / Nitrite: POSITIVE   Leuk Esterase: LARGE / RBC: 2-5 / WBC >50   Sq Epi: FEW / Non Sq Epi: x / Bacteria: MANY          RADIOLOGY & ADDITIONAL TESTS:    ECG Personally Reviewed -     Imaging Personally Reviewed:    Consultant(s) Notes Reviewed:      Care Discussed with Consultants/Other Providers:

## 2019-02-16 NOTE — ED ADULT TRIAGE NOTE - CHIEF COMPLAINT QUOTE
BIBEMS from home. D/c from [facility] 2 days ago with a hensley for urinary retention. Has had minimal urine output x 27 hours. Denies fever. Pt has also had productive cough x 3 days that has worsened

## 2019-02-16 NOTE — PATIENT PROFILE ADULT - NSPROGENANESREACTION_GEN_A_NUR
I will START or STAY ON the medications listed below when I get home from the hospital:    Lexapro  -- 20 milligram(s) by mouth once a day  -- Indication: For Anxiety    ZyrTEC 10 mg oral tablet  -- 1 tab(s) by mouth once a day  -- Indication: For Allergies    Vyvanse 10 mg oral capsule  -- 1 cap(s) by mouth once a day (in the morning)  -- Indication: For ADHD    dicyclomine  -- 10 milligram(s) by mouth once a day, As Needed  -- Indication: For Abdominal pain
no previous reaction

## 2019-02-16 NOTE — H&P ADULT - PROBLEM SELECTOR PLAN 1
baseline AAOX2 now lethargic likely 2/2 infection, UTI vs viral PNA.   -will treat infectious etiology as below  -monitor MS closely

## 2019-02-16 NOTE — H&P ADULT - PROBLEM SELECTOR PLAN 8
known enlarging kidney mass concerning for malignancy.  -family not interested in w/u and and treatment given comorbidity

## 2019-02-16 NOTE — ED PROVIDER NOTE - CLINICAL SUMMARY MEDICAL DECISION MAKING FREE TEXT BOX
Anshu Mena (Resident): 89 y/o p/w hensley not draining, worsening cough - no fever orally, will obtain rectal temp - bedside US w/ hensley balloon in the bladder - will flush and hydrate and ensure draining, labs to check for VERA, and CXR w. RVP to r/o pna especially given just hospitalized

## 2019-02-16 NOTE — ED ADULT NURSE NOTE - NSIMPLEMENTINTERV_GEN_ALL_ED
Implemented All Fall with Harm Risk Interventions:  [place] to call system. Call bell, personal items and telephone within reach. Instruct patient to call for assistance. Room bathroom lighting operational. Non-slip footwear when patient is off stretcher. Physically safe environment: no spills, clutter or unnecessary equipment. Stretcher in lowest position, wheels locked, appropriate side rails in place. Provide visual cue, wrist band, yellow gown, etc. Monitor gait and stability. Monitor for mental status changes and reorient to person, place, and time. Review medications for side effects contributing to fall risk. Reinforce activity limits and safety measures with patient and family. Provide visual clues: red socks.

## 2019-02-16 NOTE — ED ADULT NURSE NOTE - OBJECTIVE STATEMENT
Received pt. in room 11 awake alert and oriented x 2, presenting to the ER with oliguria. Pt. have a history of CVA, Hypothyroidism, DVT, CHF. Patient's home health aide Riya  at bedside stated" she was discharge from the rehab on thursday with the hensley and I noticed that it has not been draining since friday morning". Hensley catheter with scant amount of cloudy yellow urine 50ml. Removed hensley inset new hensley catheter, hensley draining cloudy yellow urine. Placed on cardiac monitor, labs sent, will continue to monitor. Patient has an intermittent non productive cough.

## 2019-02-16 NOTE — ED ADULT NURSE NOTE - CHIEF COMPLAINT QUOTE
BIBEMS from home. D/c from [facility] 2 days ago with a hensley for urinary retention. Has had minimal urine output x 27 hours. Denies fever. Pt has also had productive cough x 3 days that has worsened

## 2019-02-16 NOTE — ED PROVIDER NOTE - ATTENDING CONTRIBUTION TO CARE
89F presents from rehab with family for oliguria. Patient already has hensley. Cough. Patient stable. Lungs with crackles, rhonchi. Concern for pneumonia. No signs of sepsis. CXR, labs, UA, flush or change hensley. Reassess.

## 2019-02-17 LAB
ANION GAP SERPL CALC-SCNC: 12 MMO/L — SIGNIFICANT CHANGE UP (ref 7–14)
BASOPHILS # BLD AUTO: 0.03 K/UL — SIGNIFICANT CHANGE UP (ref 0–0.2)
BASOPHILS NFR BLD AUTO: 0.4 % — SIGNIFICANT CHANGE UP (ref 0–2)
BUN SERPL-MCNC: 24 MG/DL — HIGH (ref 7–23)
CALCIUM SERPL-MCNC: 8.2 MG/DL — LOW (ref 8.4–10.5)
CHLORIDE SERPL-SCNC: 102 MMOL/L — SIGNIFICANT CHANGE UP (ref 98–107)
CO2 SERPL-SCNC: 22 MMOL/L — SIGNIFICANT CHANGE UP (ref 22–31)
CREAT SERPL-MCNC: 0.75 MG/DL — SIGNIFICANT CHANGE UP (ref 0.5–1.3)
EOSINOPHIL # BLD AUTO: 0.11 K/UL — SIGNIFICANT CHANGE UP (ref 0–0.5)
EOSINOPHIL NFR BLD AUTO: 1.6 % — SIGNIFICANT CHANGE UP (ref 0–6)
GLUCOSE SERPL-MCNC: 124 MG/DL — HIGH (ref 70–99)
HCT VFR BLD CALC: 30.7 % — LOW (ref 34.5–45)
HGB BLD-MCNC: 9.2 G/DL — LOW (ref 11.5–15.5)
IMM GRANULOCYTES NFR BLD AUTO: 2.3 % — HIGH (ref 0–1.5)
LYMPHOCYTES # BLD AUTO: 2.11 K/UL — SIGNIFICANT CHANGE UP (ref 1–3.3)
LYMPHOCYTES # BLD AUTO: 30.4 % — SIGNIFICANT CHANGE UP (ref 13–44)
MAGNESIUM SERPL-MCNC: 1.9 MG/DL — SIGNIFICANT CHANGE UP (ref 1.6–2.6)
MCHC RBC-ENTMCNC: 29.8 PG — SIGNIFICANT CHANGE UP (ref 27–34)
MCHC RBC-ENTMCNC: 30 % — LOW (ref 32–36)
MCV RBC AUTO: 99.4 FL — SIGNIFICANT CHANGE UP (ref 80–100)
MONOCYTES # BLD AUTO: 0.59 K/UL — SIGNIFICANT CHANGE UP (ref 0–0.9)
MONOCYTES NFR BLD AUTO: 8.5 % — SIGNIFICANT CHANGE UP (ref 2–14)
NEUTROPHILS # BLD AUTO: 3.95 K/UL — SIGNIFICANT CHANGE UP (ref 1.8–7.4)
NEUTROPHILS NFR BLD AUTO: 56.8 % — SIGNIFICANT CHANGE UP (ref 43–77)
NRBC # FLD: 0 K/UL — LOW (ref 25–125)
NT-PROBNP SERPL-SCNC: 471.7 PG/ML — SIGNIFICANT CHANGE UP
PHOSPHATE SERPL-MCNC: 3.1 MG/DL — SIGNIFICANT CHANGE UP (ref 2.5–4.5)
PLATELET # BLD AUTO: 321 K/UL — SIGNIFICANT CHANGE UP (ref 150–400)
PMV BLD: 9.8 FL — SIGNIFICANT CHANGE UP (ref 7–13)
POTASSIUM SERPL-MCNC: 3.9 MMOL/L — SIGNIFICANT CHANGE UP (ref 3.5–5.3)
POTASSIUM SERPL-SCNC: 3.9 MMOL/L — SIGNIFICANT CHANGE UP (ref 3.5–5.3)
RBC # BLD: 3.09 M/UL — LOW (ref 3.8–5.2)
RBC # FLD: 18.6 % — HIGH (ref 10.3–14.5)
SODIUM SERPL-SCNC: 136 MMOL/L — SIGNIFICANT CHANGE UP (ref 135–145)
SPECIMEN SOURCE: SIGNIFICANT CHANGE UP
WBC # BLD: 6.95 K/UL — SIGNIFICANT CHANGE UP (ref 3.8–10.5)
WBC # FLD AUTO: 6.95 K/UL — SIGNIFICANT CHANGE UP (ref 3.8–10.5)

## 2019-02-17 PROCEDURE — 99233 SBSQ HOSP IP/OBS HIGH 50: CPT

## 2019-02-17 RX ADMIN — Medication 20 MILLIGRAM(S): at 06:04

## 2019-02-17 RX ADMIN — Medication 100 MICROGRAM(S): at 06:04

## 2019-02-17 RX ADMIN — AMIODARONE HYDROCHLORIDE 100 MILLIGRAM(S): 400 TABLET ORAL at 06:04

## 2019-02-17 RX ADMIN — Medication 1 TABLET(S): at 11:54

## 2019-02-17 RX ADMIN — Medication 3 MILLILITER(S): at 10:24

## 2019-02-17 RX ADMIN — ERTAPENEM SODIUM 120 MILLIGRAM(S): 1 INJECTION, POWDER, LYOPHILIZED, FOR SOLUTION INTRAMUSCULAR; INTRAVENOUS at 14:51

## 2019-02-17 RX ADMIN — QUETIAPINE FUMARATE 25 MILLIGRAM(S): 200 TABLET, FILM COATED ORAL at 11:54

## 2019-02-17 RX ADMIN — Medication 81 MILLIGRAM(S): at 11:54

## 2019-02-17 RX ADMIN — HEPARIN SODIUM 5000 UNIT(S): 5000 INJECTION INTRAVENOUS; SUBCUTANEOUS at 17:06

## 2019-02-17 RX ADMIN — SPIRONOLACTONE 12.5 MILLIGRAM(S): 25 TABLET, FILM COATED ORAL at 06:04

## 2019-02-17 RX ADMIN — Medication 3 MILLILITER(S): at 17:30

## 2019-02-17 RX ADMIN — ESCITALOPRAM OXALATE 5 MILLIGRAM(S): 10 TABLET, FILM COATED ORAL at 11:54

## 2019-02-17 RX ADMIN — Medication 3 MILLILITER(S): at 21:39

## 2019-02-17 RX ADMIN — HEPARIN SODIUM 5000 UNIT(S): 5000 INJECTION INTRAVENOUS; SUBCUTANEOUS at 06:05

## 2019-02-17 RX ADMIN — Medication 3 MILLILITER(S): at 04:12

## 2019-02-17 NOTE — PROGRESS NOTE ADULT - PROBLEM SELECTOR PLAN 3
RVP + RSV and hMPV  -CXR clear. given cough will treat fo clinical PNA   -Robitussin prn for cough   -contact isolation

## 2019-02-17 NOTE — PROGRESS NOTE ADULT - PROBLEM SELECTOR PLAN 2
UA grossly positive, hx of ESBL proteus UTI  -cont ertapenem  -f/u urine cx   -hensley changed in ER. will monitor output. TOV once infection is treated

## 2019-02-17 NOTE — CHART NOTE - NSCHARTNOTEFT_GEN_A_CORE
spoke with son Reji (HCP ) at bedside - HCP and DNR form brought in from home and copy placed on chart by rn.  DNR order entered after clarifying definition of DNR.   Continue to monitor.     Palliative care cx called as pt is requesting Hospice Referral - after clarifying with family they are seeking hospice b/c they want a nurse available at all times incnicholas hensley stops working again.  Spoke about home care as an option, family would like to speak to SW or CM tomorrow     Patient lives at home with 24 hour private aids.     F/u In AM With CM/SW for discharge plan.

## 2019-02-17 NOTE — PROGRESS NOTE ADULT - SUBJECTIVE AND OBJECTIVE BOX
Patient is a 89y old  Female who presents with a chief complaint of     SUBJECTIVE / OVERNIGHT EVENTS:    afebrile o/n. remains lethargic. drinking fluids but not eating any solids     Review of Systems:    unable to obtain     MEDICATIONS  (STANDING):  ALBUTerol/ipratropium for Nebulization 3 milliLiter(s) Nebulizer every 6 hours  amiodarone    Tablet 100 milliGRAM(s) Oral daily  aspirin  chewable 81 milliGRAM(s) Oral daily  ertapenem  IVPB 1000 milliGRAM(s) IV Intermittent every 24 hours  escitalopram 5 milliGRAM(s) Oral daily  heparin  Injectable 5000 Unit(s) SubCutaneous every 12 hours  levothyroxine 100 MICROGram(s) Oral daily  multivitamin 1 Tablet(s) Oral daily  QUEtiapine 25 milliGRAM(s) Oral daily    MEDICATIONS  (PRN):  guaiFENesin   Syrup  (Sugar-Free) 100 milliGRAM(s) Oral every 6 hours PRN Cough      PHYSICAL EXAM:  T(C): 36.6 (19 @ 05:59), Max: 37.3 (19 @ 14:15)  HR: 69 (19 @ 10:25) (66 - 83)  BP: 101/61 (19 @ 05:59) (101/61 - 144/67)  RR: 18 (19 @ 05:59) (18 - 20)  SpO2: 98% (19 @ 10:25) (95% - 99%)  I&O's Summary    2019 07:01  -  2019 07:00  --------------------------------------------------------  IN: 0 mL / OUT: 800 mL / NET: -800 mL      GENERAL: elderly female lying in bed in NAD   MENTAL STATUS/PSYCH:  AAO x0. opens eyes to voice    HEAD:  Atraumatic, Normocephalic  EYES: EOMI, PERRLA, conjunctiva and sclera clear  NECK: Supple, No elevated JVD  CHEST/LUNG: Clear to auscultation bilaterally; No wheeze  HEART: Regular rate and rhythm; No murmurs, rubs, or gallops  ABDOMEN: Soft, Nontender, Nondistended; Bowel sounds present  EXTREMITIES:  2+ Peripheral Pulses, No clubbing, cyanosis, or edema  NEUROLOGY: CN II-XII grossly intact, moving all extremities  SKIN: No rashes or lesions    LABS:  CAPILLARY BLOOD GLUCOSE                              9.2    6.95  )-----------( 321      ( 2019 06:02 )             30.7     -    136  |  102  |  24<H>  ----------------------------<  124<H>  3.9   |  22  |  0.75    Ca    8.2<L>      2019 06:02  Phos  3.1       Mg     1.9         TPro  7.0  /  Alb  3.1<L>  /  TBili  0.4  /  DBili  x   /  AST  26  /  ALT  10  /  AlkPhos  106  02-16          Urinalysis Basic - ( 2019 14:32 )    Color: YELLOW / Appearance: TURBID / S.023 / pH: 7.5  Gluc: NEGATIVE / Ketone: NEGATIVE  / Bili: NEGATIVE / Urobili: TRACE   Blood: NEGATIVE / Protein: 200 / Nitrite: POSITIVE   Leuk Esterase: LARGE / RBC: 2-5 / WBC >50   Sq Epi: FEW / Non Sq Epi: x / Bacteria: MANY

## 2019-02-18 LAB
-  AMIKACIN: SIGNIFICANT CHANGE UP
-  AMPICILLIN/SULBACTAM: SIGNIFICANT CHANGE UP
-  AMPICILLIN: SIGNIFICANT CHANGE UP
-  AZTREONAM: SIGNIFICANT CHANGE UP
-  CEFAZOLIN: SIGNIFICANT CHANGE UP
-  CEFEPIME: SIGNIFICANT CHANGE UP
-  CEFOXITIN: SIGNIFICANT CHANGE UP
-  CEFTAZIDIME: SIGNIFICANT CHANGE UP
-  CEFTRIAXONE: SIGNIFICANT CHANGE UP
-  CIPROFLOXACIN: SIGNIFICANT CHANGE UP
-  ERTAPENEM: SIGNIFICANT CHANGE UP
-  GENTAMICIN: SIGNIFICANT CHANGE UP
-  LEVOFLOXACIN: SIGNIFICANT CHANGE UP
-  MEROPENEM: SIGNIFICANT CHANGE UP
-  NITROFURANTOIN: SIGNIFICANT CHANGE UP
-  PIPERACILLIN/TAZOBACTAM: SIGNIFICANT CHANGE UP
-  TOBRAMYCIN: SIGNIFICANT CHANGE UP
-  TRIMETHOPRIM/SULFAMETHOXAZOLE: SIGNIFICANT CHANGE UP
BACTERIA UR CULT: SIGNIFICANT CHANGE UP
METHOD TYPE: SIGNIFICANT CHANGE UP
ORGANISM # SPEC MICROSCOPIC CNT: SIGNIFICANT CHANGE UP
ORGANISM # SPEC MICROSCOPIC CNT: SIGNIFICANT CHANGE UP

## 2019-02-18 PROCEDURE — 99233 SBSQ HOSP IP/OBS HIGH 50: CPT

## 2019-02-18 RX ORDER — IPRATROPIUM/ALBUTEROL SULFATE 18-103MCG
3 AEROSOL WITH ADAPTER (GRAM) INHALATION EVERY 6 HOURS
Qty: 0 | Refills: 0 | Status: DISCONTINUED | OUTPATIENT
Start: 2019-02-18 | End: 2019-02-22

## 2019-02-18 RX ORDER — ONDANSETRON 8 MG/1
4 TABLET, FILM COATED ORAL ONCE
Qty: 0 | Refills: 0 | Status: COMPLETED | OUTPATIENT
Start: 2019-02-18 | End: 2019-02-18

## 2019-02-18 RX ADMIN — HEPARIN SODIUM 5000 UNIT(S): 5000 INJECTION INTRAVENOUS; SUBCUTANEOUS at 05:05

## 2019-02-18 RX ADMIN — ERTAPENEM SODIUM 120 MILLIGRAM(S): 1 INJECTION, POWDER, LYOPHILIZED, FOR SOLUTION INTRAMUSCULAR; INTRAVENOUS at 16:51

## 2019-02-18 RX ADMIN — Medication 1 TABLET(S): at 16:51

## 2019-02-18 RX ADMIN — Medication 81 MILLIGRAM(S): at 16:51

## 2019-02-18 RX ADMIN — Medication 3 MILLILITER(S): at 11:02

## 2019-02-18 RX ADMIN — HEPARIN SODIUM 5000 UNIT(S): 5000 INJECTION INTRAVENOUS; SUBCUTANEOUS at 18:36

## 2019-02-18 RX ADMIN — ESCITALOPRAM OXALATE 5 MILLIGRAM(S): 10 TABLET, FILM COATED ORAL at 16:51

## 2019-02-18 RX ADMIN — AMIODARONE HYDROCHLORIDE 100 MILLIGRAM(S): 400 TABLET ORAL at 05:05

## 2019-02-18 RX ADMIN — Medication 100 MICROGRAM(S): at 05:05

## 2019-02-18 RX ADMIN — ONDANSETRON 4 MILLIGRAM(S): 8 TABLET, FILM COATED ORAL at 13:36

## 2019-02-18 RX ADMIN — QUETIAPINE FUMARATE 25 MILLIGRAM(S): 200 TABLET, FILM COATED ORAL at 18:36

## 2019-02-18 NOTE — PROGRESS NOTE ADULT - PROBLEM SELECTOR PLAN 3
RVP + RSV and hMPV  -CXR clear. given cough will treat fo clinical PNA   -Robitussin prn for cough   -cont O2 via NC to maintain SpO2 >90  -contact isolation

## 2019-02-18 NOTE — CHART NOTE - NSCHARTNOTEFT_GEN_A_CORE
Pt noted with congested lung sounds and o2 sat lowered to 89-90% on RA as per RN    pt has known hMPV and RSV , CXR clear, will provider oxygen supportive therapy , chest pt, and nebulizers PRN .   d/w Attending Dr Henley and RN

## 2019-02-18 NOTE — PROGRESS NOTE ADULT - SUBJECTIVE AND OBJECTIVE BOX
Patient is a 89y old  Female who presents with a chief complaint of     SUBJECTIVE / OVERNIGHT EVENTS:    mildly hypoxic to high 80s on RA this am, without respiratory distress. started on O2 via NC     Review of Systems:    unable to obtain       MEDICATIONS  (STANDING):  amiodarone    Tablet 100 milliGRAM(s) Oral daily  aspirin  chewable 81 milliGRAM(s) Oral daily  ertapenem  IVPB 1000 milliGRAM(s) IV Intermittent every 24 hours  escitalopram 5 milliGRAM(s) Oral daily  heparin  Injectable 5000 Unit(s) SubCutaneous every 12 hours  levothyroxine 100 MICROGram(s) Oral daily  multivitamin 1 Tablet(s) Oral daily  QUEtiapine 25 milliGRAM(s) Oral daily    MEDICATIONS  (PRN):  guaiFENesin   Syrup  (Sugar-Free) 100 milliGRAM(s) Oral every 6 hours PRN Cough      PHYSICAL EXAM:  T(C): 36.3 (19 @ 10:08), Max: 36.6 (19 @ 14:22)  HR: 60 (19 @ 10:08) (60 - 72)  BP: 112/57 (19 @ 10:08) (112/57 - 128/62)  RR: 22 (19 @ 10:08) (18 - 22)  SpO2: 94% (19 @ 10:08) (94% - 100%)  I&O's Summary    2019 07:01  -  2019 07:00  --------------------------------------------------------  IN: 0 mL / OUT: 850 mL / NET: -850 mL      GENERAL: elderly female lying in bed in NAD   MENTAL STATUS/PSYCH:  AAO x0. opens eyes to voice    HEAD:  Atraumatic, Normocephalic  EYES: EOMI, PERRLA, conjunctiva and sclera clear  NECK: Supple, No elevated JVD  CHEST/LUNG: Clear to auscultation bilaterally; No wheeze  HEART: Regular rate and rhythm; No murmurs, rubs, or gallops  ABDOMEN: Soft, Nontender, Nondistended; Bowel sounds present  EXTREMITIES:  2+ Peripheral Pulses, No clubbing, cyanosis, or edema  NEUROLOGY: CN II-XII grossly intact, moving all extremities  SKIN: No rashes or lesions    LABS:  CAPILLARY BLOOD GLUCOSE                              9.2    6.95  )-----------( 321      ( 2019 06:02 )             30.7     -    136  |  102  |  24<H>  ----------------------------<  124<H>  3.9   |  22  |  0.75    Ca    8.2<L>      2019 06:02  Phos  3.1       Mg     1.9         TPro  7.0  /  Alb  3.1<L>  /  TBili  0.4  /  DBili  x   /  AST  26  /  ALT  10  /  AlkPhos  106  02-16          Urinalysis Basic - ( 2019 14:32 )    Color: YELLOW / Appearance: TURBID / S.023 / pH: 7.5  Gluc: NEGATIVE / Ketone: NEGATIVE  / Bili: NEGATIVE / Urobili: TRACE   Blood: NEGATIVE / Protein: 200 / Nitrite: POSITIVE   Leuk Esterase: LARGE / RBC: 2-5 / WBC >50   Sq Epi: FEW / Non Sq Epi: x / Bacteria: MANY

## 2019-02-18 NOTE — PROGRESS NOTE ADULT - PROBLEM SELECTOR PLAN 7
-cont seroquel and Lexapro given hx of severe agitation 2/2 dementia. low threshold to hold if MS worsens  -palliative care consult for GOC- family consented for DNI/DNR. Will refer to hospice   -known dysphagia. as per chart family does not want feeding tube. wants pleasure feeds

## 2019-02-19 PROCEDURE — 99233 SBSQ HOSP IP/OBS HIGH 50: CPT

## 2019-02-19 RX ADMIN — HEPARIN SODIUM 5000 UNIT(S): 5000 INJECTION INTRAVENOUS; SUBCUTANEOUS at 07:26

## 2019-02-19 RX ADMIN — AMIODARONE HYDROCHLORIDE 100 MILLIGRAM(S): 400 TABLET ORAL at 07:26

## 2019-02-19 RX ADMIN — Medication 81 MILLIGRAM(S): at 12:09

## 2019-02-19 RX ADMIN — HEPARIN SODIUM 5000 UNIT(S): 5000 INJECTION INTRAVENOUS; SUBCUTANEOUS at 17:55

## 2019-02-19 RX ADMIN — ESCITALOPRAM OXALATE 5 MILLIGRAM(S): 10 TABLET, FILM COATED ORAL at 12:09

## 2019-02-19 RX ADMIN — QUETIAPINE FUMARATE 25 MILLIGRAM(S): 200 TABLET, FILM COATED ORAL at 19:16

## 2019-02-19 RX ADMIN — ERTAPENEM SODIUM 120 MILLIGRAM(S): 1 INJECTION, POWDER, LYOPHILIZED, FOR SOLUTION INTRAMUSCULAR; INTRAVENOUS at 15:17

## 2019-02-19 RX ADMIN — Medication 1 TABLET(S): at 12:09

## 2019-02-19 RX ADMIN — Medication 100 MICROGRAM(S): at 07:26

## 2019-02-19 NOTE — DIETITIAN INITIAL EVALUATION ADULT. - ENERGY NEEDS
Ht: 68 inches Wt: 148.2 pounds  BMI: 22.5  kg/m2 IBW:  140 pounds (+/-10%) %IBW: 105%  Edema: No edema noted  Skin: intact, no pressure injuries noted

## 2019-02-19 NOTE — DIETITIAN INITIAL EVALUATION ADULT. - OTHER INFO
Patient seen for nutritional consult for significant decrease in PO intake >5 days. Per aid at the bedside patient consuming about 25% of meals, which is normally more than what the patient usually eats. Patient receiving Ensure x 3 daily, however aid stated the patient would prefer chocolate instead of vanilla. As per chart family does not want feeding tube, wants pleasure feeds. Aid reported patient vomiting and felt nauseous yesterday. Denies any constipation or diarrhea (Last BM: 2/18). No allergies or intolerances. Unable to provide usual weight at this time. Per previous RDN note 1/22- 157.8 pounds. Current weight: 148.2 pounds. No edema noted.

## 2019-02-19 NOTE — DIETITIAN INITIAL EVALUATION ADULT. - PERTINENT LABORATORY DATA
02-17 Na136 mmol/L Glu 124 mg/dL<H> K+ 3.9 mmol/L Cr  0.75 mg/dL BUN 24 mg/dL<H> 02-17 Phos 3.1 mg/dL 02-16 Alb 3.1 g/dL<L> 01-21 [id number] 5.1 % 01-21 Chol 75 mg/dL<L> LDL 24 mg/dL HDL 39 mg/dL<L> Trig 69 mg/dL

## 2019-02-19 NOTE — DIETITIAN INITIAL EVALUATION ADULT. - PHYSICAL APPEARANCE
Patient with history of severe malnutrition- unable to conduct Nutrition focused physical exam conducted due to mental status + patient sleeping, however patient with severe muscle and fat wasting in orbital and temporal regions.

## 2019-02-19 NOTE — PROGRESS NOTE ADULT - PROBLEM SELECTOR PLAN 7
-cont seroquel and Lexapro given hx of severe agitation 2/2 dementia. low threshold to hold if MS worsens  -palliative care consult for GOC- family consented for DNI/DNR. Referred to hospice   -known dysphagia. as per chart family does not want feeding tube. wants pleasure feeds

## 2019-02-19 NOTE — DIETITIAN INITIAL EVALUATION ADULT. - PERTINENT MEDS FT
MEDICATIONS  (STANDING):  amiodarone    Tablet 100 milliGRAM(s) Oral daily  aspirin  chewable 81 milliGRAM(s) Oral daily  ertapenem  IVPB 1000 milliGRAM(s) IV Intermittent every 24 hours  escitalopram 5 milliGRAM(s) Oral daily  heparin  Injectable 5000 Unit(s) SubCutaneous every 12 hours  levothyroxine 100 MICROGram(s) Oral daily  multivitamin 1 Tablet(s) Oral daily  QUEtiapine 25 milliGRAM(s) Oral daily    MEDICATIONS  (PRN):  ALBUTerol/ipratropium for Nebulization 3 milliLiter(s) Nebulizer every 6 hours PRN Shortness of Breath and/or Wheezing  guaiFENesin   Syrup  (Sugar-Free) 100 milliGRAM(s) Oral every 6 hours PRN Cough

## 2019-02-19 NOTE — PROGRESS NOTE ADULT - SUBJECTIVE AND OBJECTIVE BOX
Patient is a 89y old  Female who presents with a chief complaint of     SUBJECTIVE / OVERNIGHT EVENTS:    No acute event. Pt yelling "go away" and refused to be examined during morning rounds. Appears comfortable when let alone.     Review of Systems:    unable to obtain       MEDICATIONS  (STANDING):  amiodarone    Tablet 100 milliGRAM(s) Oral daily  aspirin  chewable 81 milliGRAM(s) Oral daily  ertapenem  IVPB 1000 milliGRAM(s) IV Intermittent every 24 hours  escitalopram 5 milliGRAM(s) Oral daily  heparin  Injectable 5000 Unit(s) SubCutaneous every 12 hours  levothyroxine 100 MICROGram(s) Oral daily  multivitamin 1 Tablet(s) Oral daily  QUEtiapine 25 milliGRAM(s) Oral daily    MEDICATIONS  (PRN):  ALBUTerol/ipratropium for Nebulization 3 milliLiter(s) Nebulizer every 6 hours PRN Shortness of Breath and/or Wheezing  guaiFENesin   Syrup  (Sugar-Free) 100 milliGRAM(s) Oral every 6 hours PRN Cough      PHYSICAL EXAM:  T(C): 36.3 (02-19-19 @ 14:42), Max: 36.3 (02-19-19 @ 06:37)  HR: 59 (02-19-19 @ 14:42) (56 - 59)  BP: 116/62 (02-19-19 @ 14:42) (116/62 - 120/57)  RR: 18 (02-19-19 @ 14:42) (18 - 18)  SpO2: 96% (02-19-19 @ 14:42) (96% - 100%)  I&O's Summary    GENERAL: elderly female lying in bed in NAD   MENTAL STATUS/PSYCH:  AAO x0. opens eyes to voice    HEAD:  Atraumatic, Normocephalic  EYES: EOMI, PERRLA, conjunctiva and sclera clear  NECK: Supple, No elevated JVD  CHEST/LUNG: Clear to auscultation bilaterally; No wheeze  HEART: Regular rate and rhythm; No murmurs, rubs, or gallops  ABDOMEN: Soft, Nontender, Nondistended; Bowel sounds present  EXTREMITIES:  2+ Peripheral Pulses, No clubbing, cyanosis, or edema  NEUROLOGY: CN II-XII grossly intact, moving all extremities  SKIN: No rashes or lesions      LABS:  CAPILLARY BLOOD GLUCOSE

## 2019-02-19 NOTE — CHART NOTE - NSCHARTNOTEFT_GEN_A_CORE
Spoke to primary team. Goals are established, patient comfortable. Hospice to follow. Please reconsult as needed.

## 2019-02-20 ENCOUNTER — TRANSCRIPTION ENCOUNTER (OUTPATIENT)
Age: 84
End: 2019-02-20

## 2019-02-20 PROCEDURE — 99233 SBSQ HOSP IP/OBS HIGH 50: CPT

## 2019-02-20 RX ADMIN — Medication 1 TABLET(S): at 15:12

## 2019-02-20 RX ADMIN — QUETIAPINE FUMARATE 25 MILLIGRAM(S): 200 TABLET, FILM COATED ORAL at 19:23

## 2019-02-20 RX ADMIN — ERTAPENEM SODIUM 120 MILLIGRAM(S): 1 INJECTION, POWDER, LYOPHILIZED, FOR SOLUTION INTRAMUSCULAR; INTRAVENOUS at 15:12

## 2019-02-20 RX ADMIN — HEPARIN SODIUM 5000 UNIT(S): 5000 INJECTION INTRAVENOUS; SUBCUTANEOUS at 17:42

## 2019-02-20 RX ADMIN — HEPARIN SODIUM 5000 UNIT(S): 5000 INJECTION INTRAVENOUS; SUBCUTANEOUS at 06:07

## 2019-02-20 RX ADMIN — Medication 100 MICROGRAM(S): at 06:07

## 2019-02-20 RX ADMIN — ESCITALOPRAM OXALATE 5 MILLIGRAM(S): 10 TABLET, FILM COATED ORAL at 15:12

## 2019-02-20 RX ADMIN — AMIODARONE HYDROCHLORIDE 100 MILLIGRAM(S): 400 TABLET ORAL at 06:07

## 2019-02-20 RX ADMIN — Medication 81 MILLIGRAM(S): at 15:12

## 2019-02-20 NOTE — PROGRESS NOTE ADULT - SUBJECTIVE AND OBJECTIVE BOX
Patient is a 89y old  Female who presents with a chief complaint of     SUBJECTIVE / OVERNIGHT EVENTS:    No acute event. Sleeping most of day but agitated intermittently. wean off O2    Review of Systems:    unable to obtain     MEDICATIONS  (STANDING):  amiodarone    Tablet 100 milliGRAM(s) Oral daily  aspirin  chewable 81 milliGRAM(s) Oral daily  escitalopram 5 milliGRAM(s) Oral daily  heparin  Injectable 5000 Unit(s) SubCutaneous every 12 hours  levothyroxine 100 MICROGram(s) Oral daily  multivitamin 1 Tablet(s) Oral daily  QUEtiapine 25 milliGRAM(s) Oral daily    MEDICATIONS  (PRN):  ALBUTerol/ipratropium for Nebulization 3 milliLiter(s) Nebulizer every 6 hours PRN Shortness of Breath and/or Wheezing  guaiFENesin   Syrup  (Sugar-Free) 100 milliGRAM(s) Oral every 6 hours PRN Cough      PHYSICAL EXAM:  T(C): 36.7 (02-20-19 @ 13:13), Max: 36.7 (02-20-19 @ 13:13)  HR: 95 (02-20-19 @ 13:13) (60 - 95)  BP: 101/57 (02-20-19 @ 13:13) (101/57 - 109/60)  RR: 18 (02-20-19 @ 13:13) (18 - 18)  SpO2: 95% (02-20-19 @ 13:13) (95% - 96%)  I&O's Summary    19 Feb 2019 07:01  -  20 Feb 2019 07:00  --------------------------------------------------------  IN: 0 mL / OUT: 675 mL / NET: -675 mL      GENERAL: elderly female lying in bed in NAD   MENTAL STATUS/PSYCH:  AAO x0. opens eyes to voice    HEAD:  Atraumatic, Normocephalic  EYES: EOMI, PERRLA, conjunctiva and sclera clear  NECK: Supple, No elevated JVD  CHEST/LUNG: Clear to auscultation bilaterally; No wheeze  HEART: Regular rate and rhythm; No murmurs, rubs, or gallops  ABDOMEN: Soft, Nontender, Nondistended; Bowel sounds present  EXTREMITIES:  2+ Peripheral Pulses, No clubbing, cyanosis, or edema  NEUROLOGY: CN II-XII grossly intact, moving all extremities  SKIN: No rashes or lesions        LABS:  CAPILLARY BLOOD GLUCOSE

## 2019-02-20 NOTE — DISCHARGE NOTE ADULT - SECONDARY DIAGNOSIS.
Viral pneumonia UTI due to extended-spectrum beta lactamase (ESBL) producing Escherichia coli CHF (congestive heart failure) Hypothyroidism, unspecified type Dementia

## 2019-02-20 NOTE — DISCHARGE NOTE ADULT - HOSPITAL COURSE
89y Female with PMH of advanced dementia, undiagnosed kidney tumor , hemorraghic CVA, LE DVT s/p IVC filter, HFpEF, SSS s/p PPM, urinary retention with indwelling  hensley p/w acute metabolic encephalopath likely 2/2 hensley associated UTI and viral PNA . Pt with +RSV, HMPV, ESBL UTI.     METABOLIC ENCEPHALOPATHY  -baseline AAOx2  -lethargic likely secondary to infection, UTI vs PNA  -Resolved    UTI  -Ucx- ESBL  -s/p Ertapenem x 5 doses, completed    VIRAL PNA  -RVP-- RSV, +hMPV  -Robitussin  PRN cough  -CXR- 2/16: Left-sided chest wall dual-lead pacemaker. The lungs are clear.  -oxygen for support  -chest PT  -supplemental oxygen as needed  -Improved symptoms    CHF  -systolic HF  -Lasix and aldactone held     SSS  -sick sinus syndrome  -amiodarone 100mg  -sp PPM    VASCULAR DEMENTIA  Seroquel and Lexapro given hx of severe agitation   -patient has dysphagia, but family does not want feeding tube    KIDNEY MASS  -likely malignancy  -family not interested in workup    GOC discussion: Palliative consulted, as per family goal is comfort care.  Pt referred to hospice, eligible for home hospice, family agreeable.    DISPO: Home Hospice 89y Female with PMH of advanced dementia, undiagnosed kidney tumor , hemorraghic CVA, LE DVT s/p IVC filter, HFpEF, SSS s/p PPM, urinary retention with indwelling  hensley p/w acute metabolic encephalopath likely 2/2 hensley associated UTI and viral PNA . Pt with +RSV, HMPV, ESBL UTI.     METABOLIC ENCEPHALOPATHY  -baseline AAOx2  -lethargic likely secondary to infection, UTI vs PNA  -Resolved    UTI  -Ucx- ESBL  -s/p Ertapenem x 5 doses, completed    VIRAL PNA  -RVP-- RSV, +hMPV  -Robitussin  PRN cough  -CXR- 2/16: Left-sided chest wall dual-lead pacemaker. The lungs are clear.  -oxygen for support  -chest PT  -supplemental oxygen as needed  -Improved symptoms    CHF  -systolic HF  -Lasix and aldactone held     SSS  -sick sinus syndrome  -amiodarone 100mg  -sp PPM    VASCULAR DEMENTIA  Seroquel and Lexapro given hx of severe agitation   -patient has dysphagia, but family does not want feeding tube    KIDNEY MASS  -likely malignancy  -family not interested in workup    GOC discussion: Palliative consulted, as per family goal is comfort care.  Pt referred to hospice, eligible for home hospice, family agreeable. DME delivered to home 2/21    DISPO: Home Hospice Mrs. Slaazar is a 89 YO Female with PMHx of Advanced Dementia, Kidney Mass, Malignant ??, Hemorraghic CVA, LE DVT s/p IVC filter, HFpEF 67%, SSS s/p PPM, Urinary Retention with indwelling Bobby presented to [facility] with Acute Metabolic Encephalopathy likely 2/2 Obbby associated UTI and Viral PNA. Baseline mentation noted as AOx2, however on admission the patient was noted to lethargic. UA performed noted to be positive, UCx sent and the patient was started on empiric ABX therapy. UCx grew ESBL ECOLI and the patient was transitioned to Ertapenem of which she completed 5 doses while inpatient. Regarding the patient's Viral PNA, CXR noted to be negative. RVP performed with hMPV. Supportive care with cough syrup and supplemental O2 as needed continued while inpatient. Chest PT continued and symptoms improved. With treatment of UTI and Viral PNA, lethargy resolved. The patient's Lasix and Aldactone were held while inpatient. Despite improvement in lethargy the patient was noted to dysphagic. SS trials attempted however the patient failed. Given Dysphagia and history of kidney mass of which the family opted for no work up Palliative consulted. Case discussed with Palliative and family by bedside and Hospice agreed on by Family. Family wishes for DNR/ DNI and Comfort Care. Lasix, Aldactone and Lipitor are not required outpatient. Patient eligible for home hospice. Amiodarone, Lexapro and Seroquel to be continued outpatient. On 2/21 case discussed with PMD and the patient is stable for discharge. Home hospital bed delivered to home on 2/21 but family wanted to exchange bed; discharge held. On 2/22, hospital bed delivered. Patient remained stable for discharge.

## 2019-02-20 NOTE — DISCHARGE NOTE ADULT - PLAN OF CARE
Resolved [facility] Resolution continue Robitussin as needed  continue supplemental Oxygen as needed completed antibiotics remain stable continue synthroid [facility]. Supportive therapy. Continue Robitussin as needed. Continue supplemental Oxygen as needed Completed antibiotics. Stop Lasix, Spironolactone and Lipitor. Continue Synthroid. Continue Psych medications outpatient.

## 2019-02-20 NOTE — DISCHARGE NOTE ADULT - MEDICATION SUMMARY - MEDICATIONS TO STOP TAKING
I will STOP taking the medications listed below when I get home from the hospital:    spironolactone 25 mg oral tablet  -- 0.5 tab(s) by mouth three times a week T/Th/Sun    atorvastatin 10 mg oral tablet  -- 1 tab(s) by mouth once a day    furosemide 20 mg oral tablet  -- 1 tab(s) by mouth once a day

## 2019-02-20 NOTE — DISCHARGE NOTE ADULT - MEDICATION SUMMARY - MEDICATIONS TO TAKE
I will START or STAY ON the medications listed below when I get home from the hospital:    aspirin 81 mg oral tablet, chewable  -- 1 tab(s) by mouth once a day  -- Indication: For Prophylaxis    acetaminophen 325 mg oral tablet  -- 2 tab(s) by mouth every 6 hours, As needed, Mild Pain (1 - 3), Moderate Pain (4 - 6), Severe Pain (7 - 10)  -- Indication: For Pain management    amiodarone 100 mg oral tablet  -- 1 tab(s) by mouth once a day  -- Indication: For SSS (sick sinus syndrome)    Lexapro 5 mg oral tablet  -- 1 tab(s) by mouth once a day  -- Indication: For Vascular dementia without behavioral disturbance    SEROquel 25 mg oral tablet  -- 1 tab(s) by mouth once a day  -- Indication: For Vascular dementia without behavioral disturbance    benzonatate 100 mg oral capsule  -- 1 cap(s) by mouth every 8 hours, As needed, Cough  -- Indication: For Cough    ipratropium-albuterol 0.5 mg-2.5 mg/3 mLinhalation solution  -- 3 milliliter(s) inhaled every 8 hours, As Needed   -- Indication: For Shortness of breath    guaiFENesin 100 mg/5 mL oral liquid  -- 5 milliliter(s) by mouth every 6 hours, As needed, Cough  -- Indication: For Cough    levothyroxine 100 mcg (0.1 mg) oral tablet  -- 1 tab(s) by mouth once a day  -- Indication: For Hypothyroidism, unspecified type    Multiple Vitamins oral tablet  -- 1 tab(s) by mouth once a day  -- Indication: For Supplement    B Complex 50 oral tablet, extended release  -- 1 tab(s) by mouth once a day  -- Indication: For Supplement I will START or STAY ON the medications listed below when I get home from the hospital:    aspirin 81 mg oral tablet, chewable  -- 1 tab(s) by mouth once a day  -- Indication: For Prophylaxis    acetaminophen 325 mg oral tablet  -- 2 tab(s) by mouth every 6 hours, As needed, Mild Pain (1 - 3), Moderate Pain (4 - 6), Severe Pain (7 - 10)  -- Indication: For Pain management    amiodarone 100 mg oral tablet  -- 1 tab(s) by mouth once a day  -- Indication: For SSS (sick sinus syndrome)    Lexapro 5 mg oral tablet  -- 1 tab(s) by mouth once a day  -- Indication: For Vascular dementia without behavioral disturbance    SEROquel 25 mg oral tablet  -- 1 tab(s) by mouth once a day  -- Indication: For Vascular dementia without behavioral disturbance    benzonatate 100 mg oral capsule  -- 1 cap(s) by mouth every 8 hours, As needed, Cough  -- Indication: For Cough    Ventolin HFA 90 mcg/inh inhalation aerosol  -- 2 puff(s) inhaled every 6 hours, As Needed   -- For inhalation only.  It is very important that you take or use this exactly as directed.  Do not skip doses or discontinue unless directed by your doctor.  Obtain medical advice before taking any non-prescription drugs as some may affect the action of this medication.  Shake well before use.    -- Indication: For Shortness of breath    guaiFENesin 100 mg/5 mL oral liquid  -- 5 milliliter(s) by mouth every 6 hours, As needed, Cough  -- Indication: For Cough    levothyroxine 100 mcg (0.1 mg) oral tablet  -- 1 tab(s) by mouth once a day  -- Indication: For Hypothyroidism, unspecified type    Multiple Vitamins oral tablet  -- 1 tab(s) by mouth once a day  -- Indication: For Supplement    B Complex 50 oral tablet, extended release  -- 1 tab(s) by mouth once a day  -- Indication: For Supplement

## 2019-02-20 NOTE — PROGRESS NOTE ADULT - PROBLEM SELECTOR PLAN 2
UA grossly positive, hx of ESBL proteus UTI  -cont ertapenem x 5 days (completed today)  -urine cx ESBL proteus   -hensley changed in ER. will monitor output.

## 2019-02-20 NOTE — DISCHARGE NOTE ADULT - CARE PLAN
Principal Discharge DX:	Metabolic encephalopathy  Goal:	Resolved  Assessment and plan of treatment:	[facility]  Secondary Diagnosis:	Viral pneumonia  Goal:	Resolution  Assessment and plan of treatment:	continue Robitussin as needed  continue supplemental Oxygen as needed  Secondary Diagnosis:	UTI due to extended-spectrum beta lactamase (ESBL) producing Escherichia coli  Assessment and plan of treatment:	completed antibiotics  Secondary Diagnosis:	CHF (congestive heart failure)  Goal:	remain stable  Secondary Diagnosis:	Hypothyroidism, unspecified type  Assessment and plan of treatment:	continue synthroid Principal Discharge DX:	Metabolic encephalopathy  Goal:	Resolved  Assessment and plan of treatment:	[facility].  Secondary Diagnosis:	Viral pneumonia  Goal:	Resolution  Assessment and plan of treatment:	Supportive therapy. Continue Robitussin as needed. Continue supplemental Oxygen as needed  Secondary Diagnosis:	UTI due to extended-spectrum beta lactamase (ESBL) producing Escherichia coli  Assessment and plan of treatment:	Completed antibiotics.  Secondary Diagnosis:	CHF (congestive heart failure)  Goal:	remain stable  Assessment and plan of treatment:	Stop Lasix, Spironolactone and Lipitor.  Secondary Diagnosis:	Hypothyroidism, unspecified type  Assessment and plan of treatment:	Continue Synthroid.  Secondary Diagnosis:	Dementia  Assessment and plan of treatment:	Continue Psych medications outpatient.

## 2019-02-20 NOTE — PROGRESS NOTE ADULT - PROBLEM SELECTOR PLAN 3
RVP + RSV and hMPV  -CXR clear. given cough will treat fo clinical PNA   -Robitussin prn for cough   -weaned off O2  -contact isolation

## 2019-02-20 NOTE — DISCHARGE NOTE ADULT - PATIENT PORTAL LINK FT
You can access the [facility], offered by [facility], by registering with the following website: [email or web address]

## 2019-02-21 PROCEDURE — 99233 SBSQ HOSP IP/OBS HIGH 50: CPT

## 2019-02-21 RX ORDER — SPIRONOLACTONE 25 MG/1
0.5 TABLET, FILM COATED ORAL
Qty: 0 | Refills: 0 | COMMUNITY

## 2019-02-21 RX ORDER — AMIODARONE HYDROCHLORIDE 400 MG/1
1 TABLET ORAL
Qty: 30 | Refills: 0 | OUTPATIENT
Start: 2019-02-21 | End: 2019-03-22

## 2019-02-21 RX ORDER — QUETIAPINE FUMARATE 200 MG/1
1 TABLET, FILM COATED ORAL
Qty: 30 | Refills: 0 | OUTPATIENT
Start: 2019-02-21 | End: 2019-03-22

## 2019-02-21 RX ORDER — ESCITALOPRAM OXALATE 10 MG/1
1 TABLET, FILM COATED ORAL
Qty: 30 | Refills: 0 | OUTPATIENT
Start: 2019-02-21 | End: 2019-03-22

## 2019-02-21 RX ORDER — ESCITALOPRAM OXALATE 10 MG/1
1 TABLET, FILM COATED ORAL
Qty: 0 | Refills: 0 | COMMUNITY

## 2019-02-21 RX ORDER — ALBUTEROL 90 UG/1
2 AEROSOL, METERED ORAL
Qty: 1 | Refills: 0 | OUTPATIENT
Start: 2019-02-21

## 2019-02-21 RX ORDER — ATORVASTATIN CALCIUM 80 MG/1
1 TABLET, FILM COATED ORAL
Qty: 0 | Refills: 0 | COMMUNITY

## 2019-02-21 RX ORDER — FUROSEMIDE 40 MG
1 TABLET ORAL
Qty: 0 | Refills: 0 | COMMUNITY

## 2019-02-21 RX ORDER — ASPIRIN/CALCIUM CARB/MAGNESIUM 324 MG
1 TABLET ORAL
Qty: 30 | Refills: 0 | OUTPATIENT
Start: 2019-02-21 | End: 2019-03-22

## 2019-02-21 RX ORDER — ACETAMINOPHEN 500 MG
2 TABLET ORAL
Qty: 240 | Refills: 0 | OUTPATIENT
Start: 2019-02-21 | End: 2019-03-22

## 2019-02-21 RX ORDER — IPRATROPIUM/ALBUTEROL SULFATE 18-103MCG
3 AEROSOL WITH ADAPTER (GRAM) INHALATION
Qty: 270 | Refills: 0 | OUTPATIENT
Start: 2019-02-21

## 2019-02-21 RX ORDER — QUETIAPINE FUMARATE 200 MG/1
1 TABLET, FILM COATED ORAL
Qty: 0 | Refills: 0 | COMMUNITY

## 2019-02-21 RX ADMIN — Medication 81 MILLIGRAM(S): at 10:54

## 2019-02-21 RX ADMIN — ESCITALOPRAM OXALATE 5 MILLIGRAM(S): 10 TABLET, FILM COATED ORAL at 10:54

## 2019-02-21 RX ADMIN — HEPARIN SODIUM 5000 UNIT(S): 5000 INJECTION INTRAVENOUS; SUBCUTANEOUS at 17:50

## 2019-02-21 RX ADMIN — HEPARIN SODIUM 5000 UNIT(S): 5000 INJECTION INTRAVENOUS; SUBCUTANEOUS at 05:22

## 2019-02-21 RX ADMIN — QUETIAPINE FUMARATE 25 MILLIGRAM(S): 200 TABLET, FILM COATED ORAL at 22:28

## 2019-02-21 RX ADMIN — Medication 100 MILLIGRAM(S): at 10:54

## 2019-02-21 RX ADMIN — Medication 100 MICROGRAM(S): at 05:22

## 2019-02-21 RX ADMIN — Medication 1 TABLET(S): at 10:54

## 2019-02-21 RX ADMIN — AMIODARONE HYDROCHLORIDE 100 MILLIGRAM(S): 400 TABLET ORAL at 05:22

## 2019-02-21 NOTE — PROGRESS NOTE ADULT - SUBJECTIVE AND OBJECTIVE BOX
Patient is a 89y old  Female who presents with a chief complaint of change in mental status (20 Feb 2019 16:59)      SUBJECTIVE / OVERNIGHT EVENTS:    No acute event.     Review of Systems:    unable to obtain     MEDICATIONS  (STANDING):  amiodarone    Tablet 100 milliGRAM(s) Oral daily  aspirin  chewable 81 milliGRAM(s) Oral daily  escitalopram 5 milliGRAM(s) Oral daily  heparin  Injectable 5000 Unit(s) SubCutaneous every 12 hours  levothyroxine 100 MICROGram(s) Oral daily  multivitamin 1 Tablet(s) Oral daily  QUEtiapine 25 milliGRAM(s) Oral daily    MEDICATIONS  (PRN):  ALBUTerol/ipratropium for Nebulization 3 milliLiter(s) Nebulizer every 6 hours PRN Shortness of Breath and/or Wheezing  guaiFENesin   Syrup  (Sugar-Free) 100 milliGRAM(s) Oral every 6 hours PRN Cough      PHYSICAL EXAM:  T(C): 36.6 (02-21-19 @ 05:20), Max: 36.7 (02-20-19 @ 20:48)  HR: 71 (02-21-19 @ 05:20) (71 - 72)  BP: 102/59 (02-21-19 @ 05:20) (102/59 - 107/62)  RR: 18 (02-21-19 @ 05:20) (18 - 18)  SpO2: 95% (02-21-19 @ 05:20) (95% - 95%)  I&O's Summary    20 Feb 2019 07:01  -  21 Feb 2019 07:00  --------------------------------------------------------  IN: 0 mL / OUT: 750 mL / NET: -750 mL      GENERAL: elderly female lying in bed in NAD   MENTAL STATUS/PSYCH:  AAO x0. opens eyes to voice    HEAD:  Atraumatic, Normocephalic  EYES: EOMI, PERRLA, conjunctiva and sclera clear  NECK: Supple, No elevated JVD  CHEST/LUNG: Clear to auscultation bilaterally; No wheeze  HEART: Regular rate and rhythm; No murmurs, rubs, or gallops  ABDOMEN: Soft, Nontender, Nondistended; Bowel sounds present  EXTREMITIES:  2+ Peripheral Pulses, No clubbing, cyanosis, or edema  NEUROLOGY: CN II-XII grossly intact, moving all extremities  SKIN: No rashes or lesions    LABS:  CAPILLARY BLOOD GLUCOSE

## 2019-02-21 NOTE — PROGRESS NOTE ADULT - PROBLEM SELECTOR PLAN 1
baseline AAOX1-2  lethargic on admission likely 2/2 infection, UTI vs viral PNA. As per aide MS now close to baseline   -monitor MS closely

## 2019-02-22 VITALS
OXYGEN SATURATION: 100 % | SYSTOLIC BLOOD PRESSURE: 122 MMHG | DIASTOLIC BLOOD PRESSURE: 55 MMHG | HEART RATE: 72 BPM | TEMPERATURE: 98 F | RESPIRATION RATE: 18 BRPM

## 2019-02-22 PROCEDURE — 99239 HOSP IP/OBS DSCHRG MGMT >30: CPT

## 2019-02-22 RX ADMIN — AMIODARONE HYDROCHLORIDE 100 MILLIGRAM(S): 400 TABLET ORAL at 05:59

## 2019-02-22 RX ADMIN — ESCITALOPRAM OXALATE 5 MILLIGRAM(S): 10 TABLET, FILM COATED ORAL at 11:02

## 2019-02-22 RX ADMIN — Medication 100 MICROGRAM(S): at 06:00

## 2019-02-22 RX ADMIN — HEPARIN SODIUM 5000 UNIT(S): 5000 INJECTION INTRAVENOUS; SUBCUTANEOUS at 05:59

## 2019-02-22 RX ADMIN — Medication 1 TABLET(S): at 11:02

## 2019-02-22 RX ADMIN — Medication 81 MILLIGRAM(S): at 11:02

## 2019-02-22 NOTE — GOALS OF CARE CONVERSATION - PERSONAL ADVANCE DIRECTIVE - CONVERSATION DETAILS
[facility] RN Note    Met with pts son Peter. Hospice consents signed. Approved for home hospice. DME (O2 and daniel lift) ordered and pending delivery for 2/21/19 in the morning. Son stated that they already have a hospital bed.     Angelina Donovan RN
[facility] RN Note    Met with son Reji today, stated he has issue with the hospital bed at home. [facility] bed ordered for delivery tomorrow 2/22/19 in the morning.    Angelina Donovan RN
[facility] - Equipment is in the home. Hospice is ready to deliver care.

## 2019-02-22 NOTE — PROGRESS NOTE ADULT - PROBLEM SELECTOR PLAN 2
UA grossly positive, hx of ESBL proteus UTI  -completed ertapenem x 5 days  -urine cx ESBL proteus   -hensley changed in ER. will monitor output.

## 2019-02-22 NOTE — GOALS OF CARE CONVERSATION - PERSONAL ADVANCE DIRECTIVE - DOES PATIENT HAVE ADVANCE DIRECTIVE
unable to assess. pt son to visit 2/17/19

## 2019-02-22 NOTE — PROGRESS NOTE ADULT - SUBJECTIVE AND OBJECTIVE BOX
Patient is a 89y old  Female who presents with a chief complaint of change in mental status (20 Feb 2019 16:59)      SUBJECTIVE / OVERNIGHT EVENTS:    No acute event o/n. Pt sleeping comfortably most of the day, but when waken up she is yelling and hitting. As per aid she does not eat much solid food but is drinking liquids w/o difficulty.     Review of Systems:    unable to obtain       MEDICATIONS  (STANDING):  amiodarone    Tablet 100 milliGRAM(s) Oral daily  aspirin  chewable 81 milliGRAM(s) Oral daily  escitalopram 5 milliGRAM(s) Oral daily  heparin  Injectable 5000 Unit(s) SubCutaneous every 12 hours  levothyroxine 100 MICROGram(s) Oral daily  multivitamin 1 Tablet(s) Oral daily  QUEtiapine 25 milliGRAM(s) Oral daily    MEDICATIONS  (PRN):  ALBUTerol/ipratropium for Nebulization 3 milliLiter(s) Nebulizer every 6 hours PRN Shortness of Breath and/or Wheezing  guaiFENesin   Syrup  (Sugar-Free) 100 milliGRAM(s) Oral every 6 hours PRN Cough      PHYSICAL EXAM:  T(C): 36.4 (02-22-19 @ 05:55), Max: 36.7 (02-21-19 @ 19:33)  HR: 62 (02-22-19 @ 05:55) (62 - 70)  BP: 115/56 (02-22-19 @ 05:55) (106/62 - 115/56)  RR: 16 (02-22-19 @ 05:55) (16 - 16)  SpO2: 95% (02-22-19 @ 05:55) (95% - 95%)  I&O's Summary    GENERAL: elderly female lying in bed in NAD   MENTAL STATUS/PSYCH:  AAO x0. opens eyes to voice    HEAD:  Atraumatic, Normocephalic  EYES: EOMI, PERRLA, conjunctiva and sclera clear  NECK: Supple, No elevated JVD  CHEST/LUNG: Clear to auscultation bilaterally; No wheeze  HEART: Regular rate and rhythm; No murmurs, rubs, or gallops  ABDOMEN: Soft, Nontender, Nondistended; Bowel sounds present  EXTREMITIES:  2+ Peripheral Pulses, No clubbing, cyanosis, or edema  NEUROLOGY: CN II-XII grossly intact, moving all extremities  SKIN: No rashes or lesions    LABS:  CAPILLARY BLOOD GLUCOSE

## 2019-02-22 NOTE — PROGRESS NOTE ADULT - PROBLEM SELECTOR PLAN 1
baseline AAOX 0-1 lethargic on admission likely 2/2 infection, UTI vs viral PNA. As per aid MS now close to baseline   -monitor MS closely

## 2019-02-25 ENCOUNTER — APPOINTMENT (OUTPATIENT)
Dept: UROLOGY | Facility: CLINIC | Age: 84
End: 2019-02-25

## 2019-05-08 ENCOUNTER — APPOINTMENT (OUTPATIENT)
Dept: CARDIOLOGY | Facility: CLINIC | Age: 84
End: 2019-05-08

## 2019-07-05 NOTE — DIETITIAN INITIAL EVALUATION ADULT. - PROBLEM SELECTOR PLAN 10
Outgoing cradle call placed. No answer. Left general voicemail message. Will attempt a second call. - Diet: dysphagia diet, thickened liquids; aspiration precautions   - DVT PPx: Lovenox 40mg QD  - DNR/DNI

## 2019-10-03 NOTE — PHYSICAL THERAPY INITIAL EVALUATION ADULT - GAIT DEVIATIONS NOTED, PT EVAL
SW contacted pt by phone to follow-up on MD referral due to housing concerns. Pt reported that she is currently in foster care, and her foster mother would like to her to find other housing due to pt's current pregnancy. Pt and FOB would like to find an apartment, but the cost is prohibitive. Pt confirmed that she is not being forced to leave the foster home, and her foster mom is \"saying it nicely\", but pt feels pressure to move. SW spoke with pt about housing options with [employer] and [employer] [facility]. Pt consented for SW to e-mail her these resources and confirmed the e-mail address listed in her EMR as correct. SW also inquired about any housing assistance through her foster care program. Pt confirmed that they can potentially offer assistance and she has already initiated conversation with her SW about it. Pt denied having any other questions or concerns at this time. SW encouraged pt to reach out in the future if any other needs arise. Pt agreed.   
decreased stride length/decreased step length/decreased weight-shifting ability/decreased rodney

## 2019-12-16 NOTE — PROGRESS NOTE ADULT - PROBLEM SELECTOR PLAN 1
Outpatient Physical Therapy  DAILY TREATMENT     [facility] [place]  [address]  [place] [address]  Phone:  [phone]  Fax:  [phone]    Date: 12/16/2019    Patient: Brenda Stephen  YOB: 1999  MRN: [id number]     Time Calculation  Start time: 1430  Stop time: 1500 Time Calculation (min): 30 minutes       Chief Complaint: Bilateral wrist pain  Visit #: 3    SUBJECTIVE:  Pt reports pain is doing better. Only gets jolts of pain with random tasks like typing or picking up objects. Pt is determined to get better. Has purchased  strength tools.      OBJECTIVE:  Current objective measures: wrist flexion and extension 4+/5 painful wrist flexion R.  strength 60lb R, 55 lbs left.           Therapeutic Exercises (CPT 91941):     1. Ube, lvl 3 x 1.5 min    2. Wrist extension, 3# x 20     3. Wrist flexion, 3# x 20    4. Digiextened, 1min each    5. Versa , 5lb x 30 sec x 3 each    6. Dowel supination/ pronation, x 10 each      Time-based treatments/modalities:  Therapeutic exercise minutes (CPT 76285): 30 minutes       Pain rating before treatment: 0  Pain rating after treatment: 0 at rest, 5/10 with dowel exercise    ASSESSMENT:   Response to treatment:  pt tolerated all exercises without increased pain at rest. Improved  strength noted.     PLAN/RECOMMENDATIONS:   Plan for treatment: therapy treatment to continue next visit.  Planned interventions for next visit: continue with current treatment.        - Minimally displaced right lateral 5th rib fracture 2/2 mechanical fall  - Limited ability to accomplish ADLs due to pain   - Pain control w/ Lidocaine patch and Tylenol 975mg q6h.   - hold off on oxycodone due to urinary retention   - CXR w/ no evidence of pneumothorax.  - Incentive spirometry - Minimally displaced right lateral 5th rib fracture 2/2 mechanical fall  - Limited ability to accomplish ADLs due to pain   - Pain control w/ Lidocaine patch and Tylenol 975mg q6h.   - hold off on oxycodone due to urinary retention   - CXR w/ no evidence of pneumothorax.  - Incentive spirometry  - Can Anesthesia Acute pain for nerve block, will see patient when possible most likely tomorrow.

## 2020-11-16 NOTE — H&P ADULT - NSHPPOAPULMEMBOLUS_GEN_A_CORE
no Xeljanz Counseling: I discussed with the patient the risks of Xeljanz therapy including increased risk of infection, liver issues, headache, diarrhea, or cold symptoms. Live vaccines should be avoided. They were instructed to call if they have any problems.

## 2021-01-07 NOTE — ED ADULT NURSE NOTE - NS ED NURSE LEVEL OF CONSCIOUSNESS SPEECH
Problem: At Risk for Falls  Goal: # Patient does not fall  Outcome: Outcome Met, Continue evaluating goal progress toward completion  Note: Patient remains free from falls throughout shift. Bed alarm on and call light within reach. Patient calling appropriately. Will continue to monitor through remainder of shift.       Speaking Coherently

## 2021-01-25 NOTE — ED PROVIDER NOTE - CHPI ED SYMPTOMS POS
Roshni Artist is a 15year old female. HPI:   Patient here for a few reasons:    1) Has a lump in left armpit for a few months now, seems to be gettinga  Little bigger. No drainage. No injury, no other lumps and otherwise feels fine.   No fevers, chills, sebaceous cyst; referred to Dr. Karina Robertson to discuss option of removal (diagnostic and therapeutic)  Bilateral impacted cerumen  -soften with a week of debrox then nurse visit to flush then hydrogen peroxide 2days/week or 1 week out of every month for [facility] sacral pain

## 2021-06-29 NOTE — PROGRESS NOTE ADULT - PROBLEM/PLAN-1
DISPLAY PLAN FREE TEXT
Detail Level: Zone

## 2021-09-12 NOTE — DISCHARGE NOTE ADULT - DISCHARGE WEIGHT
Patient was initially accepted to our service on Friday as  refused hospital admission. Apparently,  later agreed to admit this patient and transfer is currently pending. Dr Leopoldo Fern called to request that the patient be admitted to our service pending transfer to [facility]. I discussed this with Dr Padilla Rutledge, and she will evaluate this patient today. Medications updated according to her usual home meds. bed

## 2022-01-05 NOTE — DISCHARGE NOTE ADULT - NS MD DC FALL RISK RISK
For information on Fall & Injury Prevention, visit [email or web address] Statement Selected

## 2022-12-11 NOTE — DIETITIAN INITIAL EVALUATION ADULT. - PROBLEM/PLAN-9
DISPLAY PLAN FREE TEXT
Patient with one or more new problems requiring additional work-up/treatment.

## 2023-07-06 NOTE — H&P ADULT. - MUSCULOSKELETAL COMMENTS
pain of the right groin area, ambulates with a walker no tenderness over groin areas, (cites pain of right groin with movement), unable to examine the back since patient unable to move because of right groin pain Xeldarekz Pregnancy And Lactation Text: This medication is Pregnancy Category D and is not considered safe during pregnancy.  The risk during breast feeding is also uncertain.

## 2024-03-22 NOTE — PHYSICAL THERAPY INITIAL EVALUATION ADULT - PREDICTED DURATION OF THERAPY (DAYS/WKS), PT EVAL
Labs and CHF clinic note reviewed  Vitals: 137/73, 104    Current GDMT:  Toprol 25 mg daily   Jardiance 10 mg daily   Entresto 24/26 mg BID    Increase Toprol 50 mg twice daily   Follow up in CHF clinic note reviewed by discharge

## 2024-04-30 NOTE — H&P ADULT - GENERAL COMMENTS
ROS limited to secondary to patient's condition Additional Notes: -Advised to use Cetaphil or Cerave cream after the shower \\n-Advised to use Dove Bar unscented Render Risk Assessment In Note?: no Detail Level: Simple

## 2024-05-09 NOTE — ED ADULT NURSE NOTE - PRO INTERPRETER NEED 2
Sharla (patients wife) called.   [facility] in [place] rc'd the suppository order.  However, it was for 30 suppositories and non separately.  Can the script be changed to 30?  If so, please send it to [facility].   English

## 2024-06-18 NOTE — GOALS OF CARE CONVERSATION - PERSONAL ADVANCE DIRECTIVE - DOES PATIENT HAVE ADVANCE DIRECTIVE
Daughter.    Allergies, medical, surgical, family and psychosocial histories reviewed with patient. Periop plan of care reviewed. Preop instructions given, including medications to take and to hold. Hibiclens soap and instructions on use given. Time allotted for questions to be addressed.      Arrival time 0530.    Instructed to take Levothyroxine the morning of surgery.        Yes

## 2024-08-03 NOTE — ED ADULT NURSE NOTE - NS ED NURSE PATIENT LEFT UNIT TIME
Postoperative day #2  Patient status post septoplasty and Coblation of bilateral inferior turbinates.  No complaints.  Nasal splints removed without difficulty.  Septum straight and intact.  Suture line intact.  Impression: Healing as expected.  Plan: Okay to gently blow the nose.  No nasal sprays.  Follow-up in 2 weeks time, sooner if problems.     00:01

## 2024-09-13 NOTE — PROGRESS NOTE ADULT - PROBLEM SELECTOR PLAN 2
UA grossly positive, hx of ESBL proteus UTI  -cont ertapenem x 5 days (to complete 2/20)  -urine cx ESBL proteus   -hensley changed in ER. will monitor output. electronic

## 2024-12-15 NOTE — DISCHARGE NOTE ADULT - NS MD DC FALL RISK RISK
show
For information on Fall & Injury Prevention, visit [email or web address]

## 2025-01-11 NOTE — DISCHARGE NOTE ADULT - FUNCTIONAL SCREEN CURRENT LEVEL: AMBULATION, MLM
No surgical intervention planned at this time  Repeat CT head as per neurosurgical recommendations   4 = completely dependent

## 2025-07-08 NOTE — PROGRESS NOTE ADULT - PROBLEM SELECTOR PLAN 2
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  In August has similar retention issues, thought 2/2 pain, seen by urology.  -s/p straight cath protocol x3, retained, hensley placed on 1/23 with 600cc of UOP  -Evaluated by Urology as inpatient for retention; recommend continuing Hensley catheter  -will need OP  f/u--arranged for 2/4 at [facility] Opt out